# Patient Record
Sex: FEMALE | Race: WHITE | Employment: OTHER | URBAN - NONMETROPOLITAN AREA
[De-identification: names, ages, dates, MRNs, and addresses within clinical notes are randomized per-mention and may not be internally consistent; named-entity substitution may affect disease eponyms.]

---

## 2020-05-19 ENCOUNTER — OFFICE VISIT (OUTPATIENT)
Dept: SURGERY | Age: 70
End: 2020-05-19
Payer: COMMERCIAL

## 2020-05-19 VITALS — DIASTOLIC BLOOD PRESSURE: 80 MMHG | SYSTOLIC BLOOD PRESSURE: 150 MMHG | HEART RATE: 80 BPM

## 2020-05-19 PROBLEM — R59.0 AXILLARY ADENOPATHY: Status: ACTIVE | Noted: 2020-05-19

## 2020-05-19 PROBLEM — N64.4 MASTODYNIA OF RIGHT BREAST: Status: ACTIVE | Noted: 2020-05-19

## 2020-05-19 PROCEDURE — 99204 OFFICE O/P NEW MOD 45 MIN: CPT | Performed by: SURGERY

## 2020-05-19 RX ORDER — FLUTICASONE FUROATE, UMECLIDINIUM BROMIDE AND VILANTEROL TRIFENATATE 100; 62.5; 25 UG/1; UG/1; UG/1
POWDER RESPIRATORY (INHALATION)
COMMUNITY
Start: 2020-05-07

## 2020-05-19 RX ORDER — NEBIVOLOL HYDROCHLORIDE 5 MG/1
TABLET ORAL
COMMUNITY
Start: 2020-04-20

## 2020-05-19 RX ORDER — ESOMEPRAZOLE MAGNESIUM 40 MG/1
CAPSULE, DELAYED RELEASE ORAL
COMMUNITY
Start: 2020-03-23

## 2020-05-19 RX ORDER — ALBUTEROL SULFATE 90 UG/1
2 AEROSOL, METERED RESPIRATORY (INHALATION) EVERY 6 HOURS PRN
COMMUNITY

## 2020-05-19 RX ORDER — GABAPENTIN 100 MG/1
100 CAPSULE ORAL 3 TIMES DAILY
COMMUNITY

## 2020-05-19 RX ORDER — CEPHALEXIN 500 MG/1
500 CAPSULE ORAL 4 TIMES DAILY
Qty: 28 CAPSULE | Refills: 0 | Status: SHIPPED | OUTPATIENT
Start: 2020-05-19 | End: 2020-05-26

## 2020-05-19 RX ORDER — MONTELUKAST SODIUM 10 MG/1
TABLET ORAL
COMMUNITY
Start: 2020-02-27

## 2020-05-19 ASSESSMENT — ENCOUNTER SYMPTOMS
COUGH: 0
ABDOMINAL PAIN: 0
CHEST TIGHTNESS: 0
SORE THROAT: 0
EYE REDNESS: 0
COLOR CHANGE: 0
ABDOMINAL DISTENTION: 1
RECTAL PAIN: 1
CONSTIPATION: 1
SHORTNESS OF BREATH: 1
EYE PAIN: 0
WHEEZING: 1
NAUSEA: 0
DIARRHEA: 1
VOMITING: 0
BACK PAIN: 1

## 2020-05-19 NOTE — PROGRESS NOTES
Dispense:  28 capsule     Refill:  0     Discussed with the patient that her appearance on exam is more consistent with inflammation and infection than inflammatory breast cancer. Will treat with another course of antibiotics. Encouraged warm compresses and NSAIDs. Stressed the importance of smoking cessation and limiting caffeine intake to decrease worsening breast pain and improve healing. She notes understanding. Will proceed with sonogram given significant swelling to the right breast and tender palpable nodule in the right axilla. Further plan pending this report and improvement on antibiotics. Return in about 2 weeks (around 6/2/2020).     DO Sagar 7/36/6875 10:33 AM

## 2020-06-04 ENCOUNTER — HOSPITAL ENCOUNTER (OUTPATIENT)
Dept: WOMENS IMAGING | Age: 70
Discharge: HOME | End: 2020-06-04
Payer: MEDICARE

## 2020-06-04 ENCOUNTER — OFFICE VISIT (OUTPATIENT)
Dept: SURGERY | Age: 70
End: 2020-06-04
Payer: MEDICARE

## 2020-06-04 VITALS
WEIGHT: 173.8 LBS | DIASTOLIC BLOOD PRESSURE: 70 MMHG | HEIGHT: 62 IN | TEMPERATURE: 97.8 F | SYSTOLIC BLOOD PRESSURE: 146 MMHG | BODY MASS INDEX: 31.98 KG/M2

## 2020-06-04 PROBLEM — R59.0 AXILLARY ADENOPATHY: Status: RESOLVED | Noted: 2020-05-19 | Resolved: 2020-06-04

## 2020-06-04 PROCEDURE — 1090F PRES/ABSN URINE INCON ASSESS: CPT | Performed by: SURGERY

## 2020-06-04 PROCEDURE — G8427 DOCREV CUR MEDS BY ELIG CLIN: HCPCS | Performed by: SURGERY

## 2020-06-04 PROCEDURE — 1123F ACP DISCUSS/DSCN MKR DOCD: CPT | Performed by: SURGERY

## 2020-06-04 PROCEDURE — G8400 PT W/DXA NO RESULTS DOC: HCPCS | Performed by: SURGERY

## 2020-06-04 PROCEDURE — 3017F COLORECTAL CA SCREEN DOC REV: CPT | Performed by: SURGERY

## 2020-06-04 PROCEDURE — G8417 CALC BMI ABV UP PARAM F/U: HCPCS | Performed by: SURGERY

## 2020-06-04 PROCEDURE — 99212 OFFICE O/P EST SF 10 MIN: CPT | Performed by: SURGERY

## 2020-06-04 PROCEDURE — 76641 ULTRASOUND BREAST COMPLETE: CPT

## 2020-06-04 PROCEDURE — 4004F PT TOBACCO SCREEN RCVD TLK: CPT | Performed by: SURGERY

## 2020-06-04 PROCEDURE — 4040F PNEUMOC VAC/ADMIN/RCVD: CPT | Performed by: SURGERY

## 2020-06-04 RX ORDER — CEFDINIR 300 MG/1
CAPSULE ORAL
COMMUNITY
Start: 2020-05-29

## 2020-06-04 ASSESSMENT — ENCOUNTER SYMPTOMS
EYE PAIN: 0
CONSTIPATION: 1
SORE THROAT: 0
VOMITING: 0
BACK PAIN: 1
COLOR CHANGE: 0
ABDOMINAL PAIN: 0
EYE REDNESS: 0
COUGH: 0
SHORTNESS OF BREATH: 1
DIARRHEA: 1
RECTAL PAIN: 1
ABDOMINAL DISTENTION: 1
CHEST TIGHTNESS: 0
NAUSEA: 0
WHEEZING: 1

## 2020-06-04 NOTE — PROGRESS NOTES
SUBJECTIVE:  Ms. Francisco Hernandez is a 78 y.o. female who presents today in follow up for her right breast pain and swelling. She was treated with a round of antibiotics and she felt her adenopathy and breast pain did improve. She has since had an URI which is being treated. She does note continued asymmetric breasts with right larger than left. She has no further breast pain, but a tiny ting of pain from time to time in her right axilla. Patient's medications, allergies, past medical, surgical, social and family histories werereviewed and updated as appropriate. Review of Systems   Constitutional: Positive for activity change and fatigue. Negative for fever and unexpected weight change. HENT: Positive for ear pain and tinnitus. Negative for hearing loss, nosebleeds and sore throat. Eyes: Negative for pain, redness and visual disturbance. Respiratory: Positive for shortness of breath and wheezing. Negative for cough and chest tightness. Cardiovascular: Negative for chest pain, palpitations and leg swelling. Gastrointestinal: Positive for abdominal distention, constipation, diarrhea and rectal pain. Negative for abdominal pain, nausea and vomiting. Endocrine: Positive for polydipsia. Negative for cold intolerance and heat intolerance. Genitourinary: Positive for urgency. Negative for difficulty urinating and frequency. Musculoskeletal: Positive for arthralgias, back pain and gait problem. Negative for joint swelling and neck pain. Skin: Negative for color change, rash and wound. Allergic/Immunologic: Negative for environmental allergies and food allergies. Neurological: Negative for seizures, light-headedness and headaches. Hematological: Negative for adenopathy. Bruises/bleeds easily. Psychiatric/Behavioral: Negative for confusion, sleep disturbance and suicidal ideas.        OBJECTIVE:  BP (!) 146/70 (Site: Left Upper Arm, Position: Sitting, Cuff Size: Medium Adult)   Temp 97.8 °F (36.6 °C) 6/4/2020 11:20 AM         ASSESSMENT:   Diagnosis Orders   1. Mastodynia of right breast         PLAN:  Discussed with patient benign findings on exam and improved adenopathy since antibiotic course completed. We discussed her asymmetrical breasts and bra fittings for comfort. She is going to reach out to [facility] about special order bras. Will continue yearly mammography and exams.        Return in about 9 months (around 3/15/2021) for Yearly Mammography and [facility],  4/7/3677 11:44 AM

## 2021-03-25 ENCOUNTER — TELEPHONE (OUTPATIENT)
Dept: SURGERY | Age: 71
End: 2021-03-25

## 2021-03-25 NOTE — TELEPHONE ENCOUNTER
Called patient and gave her the following appt information:    Patient scheduled to have her follow up screening mammogram 4/6/21 arrive at 10:30 for 11:00 appt/  She is having cataract surgery and does not wish to have mammogram until April. She will see Dr. Vivi Hubbard on 4/15/21 at 11:30.

## 2021-05-06 ENCOUNTER — HOSPITAL ENCOUNTER (OUTPATIENT)
Dept: WOMENS IMAGING | Age: 71
Discharge: HOME | End: 2021-05-06
Payer: MEDICARE

## 2021-05-06 DIAGNOSIS — Z12.31 BREAST CANCER SCREENING BY MAMMOGRAM: ICD-10-CM

## 2021-05-06 DIAGNOSIS — N64.4 MASTODYNIA OF RIGHT BREAST: ICD-10-CM

## 2021-05-06 PROCEDURE — G0279 TOMOSYNTHESIS, MAMMO: HCPCS

## 2023-03-21 ENCOUNTER — OFFICE VISIT (OUTPATIENT)
Dept: NEUROLOGY | Age: 73
End: 2023-03-21
Payer: MEDICARE

## 2023-03-21 VITALS
HEIGHT: 61 IN | DIASTOLIC BLOOD PRESSURE: 90 MMHG | WEIGHT: 155 LBS | SYSTOLIC BLOOD PRESSURE: 184 MMHG | HEART RATE: 66 BPM | OXYGEN SATURATION: 100 % | BODY MASS INDEX: 29.27 KG/M2

## 2023-03-21 DIAGNOSIS — R41.3 MEMORY CHANGE: ICD-10-CM

## 2023-03-21 DIAGNOSIS — R53.83 OTHER FATIGUE: ICD-10-CM

## 2023-03-21 DIAGNOSIS — R41.3 MEMORY CHANGE: Primary | ICD-10-CM

## 2023-03-21 LAB
ALBUMIN SERPL-MCNC: 3.6 G/DL (ref 3.5–5.2)
ALP SERPL-CCNC: 113 U/L (ref 35–104)
ALT SERPL-CCNC: 6 U/L (ref 5–33)
ANION GAP SERPL CALCULATED.3IONS-SCNC: 11 MMOL/L (ref 7–19)
AST SERPL-CCNC: 13 U/L (ref 5–32)
BASOPHILS # BLD: 0.1 K/UL (ref 0–0.2)
BASOPHILS NFR BLD: 0.8 % (ref 0–1)
BILIRUB SERPL-MCNC: 0.5 MG/DL (ref 0.2–1.2)
BUN SERPL-MCNC: 8 MG/DL (ref 8–23)
CALCIUM SERPL-MCNC: 9.1 MG/DL (ref 8.8–10.2)
CHLORIDE SERPL-SCNC: 107 MMOL/L (ref 98–111)
CO2 SERPL-SCNC: 26 MMOL/L (ref 22–29)
CREAT SERPL-MCNC: 0.8 MG/DL (ref 0.5–0.9)
CRP SERPL HS-MCNC: 4.26 MG/DL (ref 0–0.5)
EOSINOPHIL # BLD: 0.2 K/UL (ref 0–0.6)
EOSINOPHIL NFR BLD: 3 % (ref 0–5)
ERYTHROCYTE [DISTWIDTH] IN BLOOD BY AUTOMATED COUNT: 15.8 % (ref 11.5–14.5)
ERYTHROCYTE [SEDIMENTATION RATE] IN BLOOD BY WESTERGREN METHOD: 75 MM/HR (ref 0–25)
GLUCOSE SERPL-MCNC: 90 MG/DL (ref 74–109)
HCT VFR BLD AUTO: 40 % (ref 37–47)
HGB BLD-MCNC: 12.8 G/DL (ref 12–16)
HIV-1 P24 AG: NORMAL
HIV1+2 AB SERPLBLD QL IA.RAPID: NORMAL
IMM GRANULOCYTES # BLD: 0 K/UL
LYMPHOCYTES # BLD: 2.3 K/UL (ref 1.1–4.5)
LYMPHOCYTES NFR BLD: 29 % (ref 20–40)
MCH RBC QN AUTO: 31 PG (ref 27–31)
MCHC RBC AUTO-ENTMCNC: 32 G/DL (ref 33–37)
MCV RBC AUTO: 96.9 FL (ref 81–99)
MONOCYTES # BLD: 0.7 K/UL (ref 0–0.9)
MONOCYTES NFR BLD: 9.2 % (ref 0–10)
NEUTROPHILS # BLD: 4.6 K/UL (ref 1.5–7.5)
NEUTS SEG NFR BLD: 57.7 % (ref 50–65)
PLATELET # BLD AUTO: 399 K/UL (ref 130–400)
PMV BLD AUTO: 9.8 FL (ref 9.4–12.3)
POTASSIUM SERPL-SCNC: 3.9 MMOL/L (ref 3.5–5)
PROT SERPL-MCNC: 6.8 G/DL (ref 6.6–8.7)
RBC # BLD AUTO: 4.13 M/UL (ref 4.2–5.4)
SODIUM SERPL-SCNC: 144 MMOL/L (ref 136–145)
TSH SERPL DL<=0.005 MIU/L-ACNC: 1.93 UIU/ML (ref 0.35–5.5)
WBC # BLD AUTO: 7.9 K/UL (ref 4.8–10.8)

## 2023-03-21 PROCEDURE — 1123F ACP DISCUSS/DSCN MKR DOCD: CPT | Performed by: NURSE PRACTITIONER

## 2023-03-21 PROCEDURE — 99204 OFFICE O/P NEW MOD 45 MIN: CPT | Performed by: NURSE PRACTITIONER

## 2023-03-21 RX ORDER — LOSARTAN POTASSIUM 100 MG/1
TABLET ORAL
COMMUNITY
Start: 2023-01-23

## 2023-03-21 NOTE — PROGRESS NOTES
REVIEW OF SYSTEMS    Constitutional: []Fever []Sweats []Chills [] Recent Injury [x] Denies all unless marked  HEENT:[]Headache  [] Head Injury [] Hearing Loss  [] Sore Throat  [] Ear Ache [x] Denies all unless marked  Spine:  [] Neck pain  [] Back pain  [] Sciaticia  [x] Denies all unless marked  Cardiovascular:[]Heart Disease []Palpitations [] Chest Pain   [x] Denies all unless marked  Pulmonary: []Shortness of Breath []Cough   [x] Denies all unless marked  Psychiatric/Behavioral:[] Depression [] Anxiety [x] Denies all unless marked  Gastrointestinal: []Nausea  []Vomiting  []Abdominal Pain  []Constipation  []Diarrhea  [x] Denies all unless marked  Genitourinary:   [] Frequency  [] Urgency  [] Dysuria [] Incontinence  [x] Denies all unless marked  Extremities: []Pain  []Swelling  [x] Denies all unless marked  Musculoskeletal: [] Myalgias  [] Joint Pain  [] Arthritis [] Muscle Cramps [] Muscle Twitches  [x] Denies all unless marked  Sleep: []Insomnia[]Snoring []Restless Legs  []Sleep Apnea  []Daytime Sleepiness  [x] Denies all unless marked  Skin:[] Rash [] Color Change [x] Denies all unless marked   Neurological:[]Visual Disturbance [x] Memory Loss []Loss of Balance []Slurred Speech []Weakness []Seizures  [] Dizziness [x] Denies all unless marked
intact. COMMENTS: MoCA 27/30    Cranial Nerves [x]No VF deficit to confrontation,  no papilledema on fundoscopic exam.  [x]PERRLA, EOMI, no nystagmus, conjugate eye movements, no ptosis  [x]Face symmetric  [x]Facial sensation intact  [x]Tongue midline no atrophy or fasciculations present  [x]Palate midline, hearing to finger rub normal bilaterally  [x]Shoulder shrug and SCM testing normal bilaterally  COMMENTS:   Motor   [x]5/5 strength x 4 extremities  [x]Normal bulk and tone  [x]No tremor present  [x]No rigidity or bradykinesia noted  COMMENTS:   Sensory  [x]Sensation intact to light touch, pin prick, vibration, and proprioception BLE  [x]Sensation intact to light touch, pin prick, vibration, and proprioception BUE  COMMENTS:   Coordination [x]FTN normal bilaterally   [x]HTS normal bilaterally  [x]KELSEY normal bilaterally. COMMENTS:   Reflexes  [x]Symmetric and non-pathological  [x]Toes down going bilaterally  [x]No clonus present  COMMENTS:   Gait                  [x]Normal steady gait    []Ataxic    []Spastic     []Magnetic     []Shuffling  COMMENTS:       LABS RECORD AND IMAGING REVIEW (As below and per HPI)    No results found for: Pio Monroys  Lab Results   Component Value Date    WBC 7.9 03/21/2023    HGB 12.8 03/21/2023    HCT 40.0 03/21/2023    MCV 96.9 03/21/2023     03/21/2023     No results found for: NA, K, CL, CO2, BUN, CREATININE, GLUCOSE, CALCIUM, PROT, LABALBU, BILITOT, ALKPHOS, AST, ALT, LABGLOM, GFRAA, AGRATIO, GLOB  No results found for: CHOL, TRIG, HDL, LDLCALC  No results found for: TSH, T4FREE  No results found for: CRP, SEDRATE     Vitamin D- 10.6; Vitamin B12- 235     [facility] DIGITAL DIAGNOSTIC W OR WO CAD BILATERAL    Result Date: 5/6/2021  EXAM: [facility] DIGITAL DIAGNOSTIC W OR WO CAD BILATERAL -- 5/6/2021 7:38 AM INDICATION: Screen.  COMPARISON: 3/10/2020, 3/7/2019 (from [facility] with outside report reviewed under media scan date 5/14/2020) FAMILY HISTORY OF BREAST CANCER: None

## 2023-03-23 LAB — RPR SER QL: NORMAL

## 2023-03-25 LAB — VIT B1 BLD-MCNC: 156 NMOL/L (ref 70–180)

## 2023-04-17 ENCOUNTER — HOSPITAL ENCOUNTER (OUTPATIENT)
Dept: MRI IMAGING | Age: 73
Discharge: HOME | End: 2023-04-17
Payer: MEDICARE

## 2023-04-17 DIAGNOSIS — R90.89 ABNORMAL FINDING ON MRI OF BRAIN: Primary | ICD-10-CM

## 2023-04-17 DIAGNOSIS — R41.3 MEMORY CHANGE: ICD-10-CM

## 2023-04-17 PROCEDURE — 6360000004 HC RX CONTRAST MEDICATION: Performed by: NURSE PRACTITIONER

## 2023-04-17 PROCEDURE — 70553 MRI BRAIN STEM W/O & W/DYE: CPT | Performed by: RADIOLOGY

## 2023-04-17 PROCEDURE — A9577 INJ MULTIHANCE: HCPCS | Performed by: NURSE PRACTITIONER

## 2023-04-17 PROCEDURE — 70553 MRI BRAIN STEM W/O & W/DYE: CPT

## 2023-04-17 RX ADMIN — GADOBENATE DIMEGLUMINE 14 ML: 529 INJECTION, SOLUTION INTRAVENOUS at 09:20

## 2023-04-24 ENCOUNTER — TELEPHONE (OUTPATIENT)
Dept: NEUROLOGY | Age: 73
End: 2023-04-24

## 2023-04-24 NOTE — TELEPHONE ENCOUNTER
Pt called spoke with me about MRI and labs. Pt voiced understanding to all findings.  Call transferred to scheduling for NM whole body scan

## 2023-05-01 ENCOUNTER — HOSPITAL ENCOUNTER (OUTPATIENT)
Dept: NUCLEAR MEDICINE | Age: 73
Discharge: HOME | End: 2023-05-03
Payer: MEDICARE

## 2023-05-01 DIAGNOSIS — R90.89 ABNORMAL FINDING ON MRI OF BRAIN: ICD-10-CM

## 2023-05-01 PROCEDURE — 78306 BONE IMAGING WHOLE BODY: CPT | Performed by: NURSE PRACTITIONER

## 2023-05-01 PROCEDURE — 3430000000 HC RX DIAGNOSTIC RADIOPHARMACEUTICAL: Performed by: NURSE PRACTITIONER

## 2023-05-01 PROCEDURE — 78306 BONE IMAGING WHOLE BODY: CPT | Performed by: RADIOLOGY

## 2023-05-01 PROCEDURE — A9503 TC99M MEDRONATE: HCPCS | Performed by: NURSE PRACTITIONER

## 2023-05-01 RX ORDER — TC 99M MEDRONATE 20 MG/10ML
20 INJECTION, POWDER, LYOPHILIZED, FOR SOLUTION INTRAVENOUS
Status: COMPLETED | OUTPATIENT
Start: 2023-05-01 | End: 2023-05-01

## 2023-05-01 RX ADMIN — TC 99M MEDRONATE 20 MILLICURIE: 20 INJECTION, POWDER, LYOPHILIZED, FOR SOLUTION INTRAVENOUS at 13:53

## 2023-05-05 DIAGNOSIS — R94.8 ABNORMAL RADIONUCLIDE BONE SCAN: Primary | ICD-10-CM

## 2023-05-08 ENCOUNTER — TELEPHONE (OUTPATIENT)
Dept: NEUROLOGY | Age: 73
End: 2023-05-08

## 2023-05-08 NOTE — TELEPHONE ENCOUNTER
Called pt spoke with her in great detail about labs per result notes form [facility]. Pt is aware of further work up and scheduled for all test.  Pt will call my direct line if anything is needed.

## 2023-05-08 NOTE — TELEPHONE ENCOUNTER
Pt's daughter, Luke Watts, called asking about results of the Whole Body Scan. Please contact Luke Watts when able, daughter states pt is starting to go into a depression state, waiting on results.  Thank you

## 2023-05-08 NOTE — TELEPHONE ENCOUNTER
Patient daughter called again get update on results, not listed on HIPAA. She is requesting office to call mother as soon as possible as patient in worries state.      Thank you

## 2023-05-08 NOTE — TELEPHONE ENCOUNTER
Patient Daughter Carlyn Frankel stopped by the office to get results. She stated at the window \"I am on the clock is this going to be a while? \" Wilfrido Sallyholly explained to daughter that we were in clinic today but would call her. Daughter voice that \"I will just wait, I am getting some results. \"    I called daughter to room 4 to explain the situation. I am unable to give any results to her due to her not being on hippa. She voiced she would get a verbal hippa and I again explained that I was not able to do so because I could not guarantee that the patient was the one giving consent. I did explain to her that I would call the patient by end of day today. Her daughter stated that \"at this point this is cruel and unusual punishment\". I apologized that Bassam Noriega was not in clinic on Friday so I was not able to discuss this finding until today and that I had not had a chance today to call her. Daughter voiced understanding and left office.

## 2023-05-18 DIAGNOSIS — R94.8 ABNORMAL RADIONUCLIDE SCAN: Primary | ICD-10-CM

## 2023-05-18 NOTE — PROGRESS NOTES
MEDICAL ONCOLOGY CONSULTATION    Pt Name: Mellisa Luevano  MRN: [id number]  [place]: 1950  Date of evaluation: 5/21/2023    REASON FOR CONSULTATION:  Bone lesions  REQUESTING PHYSICIAN: ALEJANDRA De Leon/[facility]    History Obtained From:  patient and old medical records    HISTORY OF PRESENT ILLNESS:    Diagnosis  Left parietal bone lesion, April 2023  Thoracic/lumbar spine lesions    Treatment Summary  To be determined    Cancer History  Judit Alcantar was first seen by me on 5/19/2023. She was referred by neurology for abnormal findings of a enhancing lesion in the calvarium. 3/21/23 [facility] labs: CBC-WBC 7.9, HGB 12.8, HCT 40.0, , NEUT 4.6; CMP-WNL;  CRP 4.26; RPR negative; ESR 75; HIV-negative; B12 156; TSH reflex T4 1.93  4/17/23 MRI brain: Findings compatible with chronic microvascular ischemic change. [place] cystic focus within the left frontoparietal region may represent sequela of remote infarct. No diffusion restriction is identified to suggest acute infarct. Developmental venous anomaly within the right parietal lobe. Indeterminate 1.3 cm enhancing lesion within the left parietal bone. Neoplastic lesion cannot be excluded. Nuclear bone scan may be useful for further characterization of this lesion. 5/1/23 Bone scan: Seen best on the lateral view of the skull, there is a small focus of increased radiotracer accumulation which likely corresponds to the enhancing lesion of the left parietal bone seen on recent brain MRI from 4/17/2023. This finding remains indeterminate. Neoplastic lesion cannot be excluded. Additional multifocal radiotracer accumulation is seen within the thoracic and lumbar spine. Osseous metastatic disease cannot be excluded. Multiple compression fractures within the spine could produce a similar appearance.  Further evaluation with MRI of the thoracic and lumbar spine with and without IV contrast may be beneficial. If patient does not have a known history of malignancy,

## 2023-05-19 ENCOUNTER — HOSPITAL ENCOUNTER (OUTPATIENT)
Dept: MRI IMAGING | Age: 73
Discharge: HOME | End: 2023-05-19
Payer: MEDICARE

## 2023-05-19 ENCOUNTER — OFFICE VISIT (OUTPATIENT)
Dept: HEMATOLOGY | Age: 73
End: 2023-05-19
Payer: MEDICARE

## 2023-05-19 ENCOUNTER — HOSPITAL ENCOUNTER (OUTPATIENT)
Dept: INFUSION THERAPY | Age: 73
Discharge: HOME | End: 2023-05-19
Payer: MEDICARE

## 2023-05-19 VITALS
HEIGHT: 61 IN | WEIGHT: 151 LBS | SYSTOLIC BLOOD PRESSURE: 120 MMHG | BODY MASS INDEX: 28.51 KG/M2 | TEMPERATURE: 96.8 F | HEART RATE: 67 BPM | OXYGEN SATURATION: 96 % | DIASTOLIC BLOOD PRESSURE: 70 MMHG

## 2023-05-19 DIAGNOSIS — M89.9 LESION OF BONE OF LUMBOSACRAL SPINE: ICD-10-CM

## 2023-05-19 DIAGNOSIS — E87.1 HYPONATREMIA: ICD-10-CM

## 2023-05-19 DIAGNOSIS — Z71.89 CARE PLAN DISCUSSED WITH PATIENT: ICD-10-CM

## 2023-05-19 DIAGNOSIS — R94.8 ABNORMAL RADIONUCLIDE SCAN: ICD-10-CM

## 2023-05-19 DIAGNOSIS — R94.8 ABNORMAL RADIONUCLIDE BONE SCAN: ICD-10-CM

## 2023-05-19 DIAGNOSIS — M89.9 LESION OF LEFT PARIETAL BONE: ICD-10-CM

## 2023-05-19 DIAGNOSIS — M89.9 LYTIC BONE LESIONS ON XRAY: Primary | ICD-10-CM

## 2023-05-19 DIAGNOSIS — M89.9 LESION OF BONE OF THORACIC SPINE: ICD-10-CM

## 2023-05-19 DIAGNOSIS — M89.9 LYTIC BONE LESIONS ON XRAY: ICD-10-CM

## 2023-05-19 LAB
ALBUMIN SERPL-MCNC: 3.9 G/DL (ref 3.5–5.2)
ALP SERPL-CCNC: 115 U/L (ref 35–104)
ALT SERPL-CCNC: 8 U/L (ref 5–33)
ANION GAP SERPL CALCULATED.3IONS-SCNC: 10 MMOL/L (ref 7–19)
AST SERPL-CCNC: 15 U/L (ref 5–32)
BASOPHILS # BLD: 0.05 K/UL (ref 0.01–0.08)
BASOPHILS NFR BLD: 0.7 % (ref 0.1–1.2)
BILIRUB SERPL-MCNC: 0.5 MG/DL (ref 0.2–1.2)
BUN SERPL-MCNC: 9 MG/DL (ref 8–23)
CALCIUM SERPL-MCNC: 9.3 MG/DL (ref 8.8–10.2)
CHLORIDE SERPL-SCNC: 88 MMOL/L (ref 98–111)
CO2 SERPL-SCNC: 25 MMOL/L (ref 22–29)
CREAT SERPL-MCNC: 0.8 MG/DL (ref 0.5–0.9)
EOSINOPHIL # BLD: 0.16 K/UL (ref 0.04–0.54)
EOSINOPHIL NFR BLD: 2.1 % (ref 0.7–7)
ERYTHROCYTE [DISTWIDTH] IN BLOOD BY AUTOMATED COUNT: 14.8 % (ref 11.7–14.4)
GLUCOSE SERPL-MCNC: 90 MG/DL (ref 74–109)
HCT VFR BLD AUTO: 38.9 % (ref 34.1–44.9)
HGB BLD-MCNC: 12.8 G/DL (ref 11.2–15.7)
LYMPHOCYTES # BLD: 2.97 K/UL (ref 1.18–3.74)
LYMPHOCYTES NFR BLD: 39.8 % (ref 19.3–53.1)
MCH RBC QN AUTO: 31.2 PG (ref 25.6–32.2)
MCHC RBC AUTO-ENTMCNC: 32.9 G/DL (ref 32.3–35.5)
MCV RBC AUTO: 94.9 FL (ref 79.4–94.8)
MONOCYTES # BLD: 0.72 K/UL (ref 0.24–0.82)
MONOCYTES NFR BLD: 9.6 % (ref 4.7–12.5)
NEUTROPHILS # BLD: 3.55 K/UL (ref 1.56–6.13)
NEUTS SEG NFR BLD: 47.5 % (ref 34–71.1)
PLATELET # BLD AUTO: 360 K/UL (ref 182–369)
PMV BLD AUTO: 9.1 FL (ref 7.4–10.4)
POTASSIUM SERPL-SCNC: 3.9 MMOL/L (ref 3.5–5)
PROT SERPL-MCNC: 6.9 G/DL (ref 6.6–8.7)
RBC # BLD AUTO: 4.1 M/UL (ref 3.93–5.22)
SODIUM SERPL-SCNC: 123 MMOL/L (ref 136–145)
WBC # BLD AUTO: 7.47 K/UL (ref 3.98–10.04)

## 2023-05-19 PROCEDURE — A9577 INJ MULTIHANCE: HCPCS | Performed by: NURSE PRACTITIONER

## 2023-05-19 PROCEDURE — 36415 COLL VENOUS BLD VENIPUNCTURE: CPT

## 2023-05-19 PROCEDURE — 99212 OFFICE O/P EST SF 10 MIN: CPT

## 2023-05-19 PROCEDURE — 1123F ACP DISCUSS/DSCN MKR DOCD: CPT | Performed by: INTERNAL MEDICINE

## 2023-05-19 PROCEDURE — 6360000004 HC RX CONTRAST MEDICATION: Performed by: NURSE PRACTITIONER

## 2023-05-19 PROCEDURE — 85025 COMPLETE CBC W/AUTO DIFF WBC: CPT

## 2023-05-19 PROCEDURE — 72158 MRI LUMBAR SPINE W/O & W/DYE: CPT

## 2023-05-19 PROCEDURE — 99205 OFFICE O/P NEW HI 60 MIN: CPT | Performed by: INTERNAL MEDICINE

## 2023-05-19 RX ORDER — HYDROCHLOROTHIAZIDE 12.5 MG/1
12.5 CAPSULE, GELATIN COATED ORAL DAILY
COMMUNITY
Start: 2023-05-03

## 2023-05-19 RX ORDER — ROPINIROLE 1 MG/1
1 TABLET, FILM COATED ORAL 2 TIMES DAILY PRN
COMMUNITY
Start: 2023-04-26

## 2023-05-19 RX ADMIN — GADOBENATE DIMEGLUMINE 14 ML: 529 INJECTION, SOLUTION INTRAVENOUS at 18:39

## 2023-05-19 ASSESSMENT — PROMIS GLOBAL HEALTH SCALE
IN THE PAST 7 DAYS, HOW WOULD YOU RATE YOUR FATIGUE ON AVERAGE [ON A SCALE FROM 1 (NONE) TO 5 (VERY SEVERE)]?: 4
IN GENERAL, HOW WOULD YOU RATE YOUR PHYSICAL HEALTH [ON A SCALE OF 1 (POOR) TO 5 (EXCELLENT)]?: 4
IN THE PAST 7 DAYS, HOW OFTEN HAVE YOU BEEN BOTHERED BY EMOTIONAL PROBLEMS, SUCH AS FEELING ANXIOUS, DEPRESSED, OR IRRITABLE [ON A SCALE FROM 1 (NEVER) TO 5 (ALWAYS)]?: 4
IN GENERAL, WOULD YOU SAY YOUR HEALTH IS...[ON A SCALE OF 1 (POOR) TO 5 (EXCELLENT)]: 4
SUM OF RESPONSES TO QUESTIONS 3, 6, 7, & 8: 13
SUM OF RESPONSES TO QUESTIONS 2, 4, 5, & 10: 17
IN GENERAL, HOW WOULD YOU RATE YOUR MENTAL HEALTH, INCLUDING YOUR MOOD AND YOUR ABILITY TO THINK [ON A SCALE OF 1 (POOR) TO 5 (EXCELLENT)]?: 4
TO WHAT EXTENT ARE YOU ABLE TO CARRY OUT YOUR EVERYDAY PHYSICAL ACTIVITIES SUCH AS WALKING, CLIMBING STAIRS, CARRYING GROCERIES, OR MOVING A CHAIR [ON A SCALE OF 1 (NOT AT ALL) TO 5 (COMPLETELY)]?: 5
IN GENERAL, HOW WOULD YOU RATE YOUR SATISFACTION WITH YOUR SOCIAL ACTIVITIES AND RELATIONSHIPS [ON A SCALE OF 1 (POOR) TO 5 (EXCELLENT)]?: 5
IN THE PAST 7 DAYS, HOW WOULD YOU RATE YOUR PAIN ON AVERAGE [ON A SCALE FROM 0 (NO PAIN) TO 10 (WORST IMAGINABLE PAIN)]?: 0
IN GENERAL, PLEASE RATE HOW WELL YOU CARRY OUT YOUR USUAL SOCIAL ACTIVITIES (INCLUDES ACTIVITIES AT HOME, AT WORK, AND IN YOUR COMMUNITY, AND RESPONSIBILITIES AS A PARENT, CHILD, SPOUSE, EMPLOYEE, FRIEND, ETC) [ON A SCALE OF 1 (POOR) TO 5 (EXCELLENT)]?: 5
IN GENERAL, WOULD YOU SAY YOUR QUALITY OF LIFE IS...[ON A SCALE OF 1 (POOR) TO 5 (EXCELLENT)]: 4

## 2023-05-22 ENCOUNTER — HOSPITAL ENCOUNTER (OUTPATIENT)
Dept: CT IMAGING | Age: 73
Discharge: HOME | End: 2023-05-22
Payer: MEDICARE

## 2023-05-22 ENCOUNTER — HOSPITAL ENCOUNTER (OUTPATIENT)
Dept: MRI IMAGING | Age: 73
Discharge: HOME | End: 2023-05-22
Payer: MEDICARE

## 2023-05-22 DIAGNOSIS — R94.8 ABNORMAL RADIONUCLIDE BONE SCAN: ICD-10-CM

## 2023-05-22 LAB
ALBUMIN SERPL-MCNC: 3.72 G/DL (ref 3.75–5.01)
ALPHA1 GLOB SERPL ELPH-MCNC: 0.39 G/DL (ref 0.19–0.46)
ALPHA2 GLOB SERPL ELPH-MCNC: 0.78 G/DL (ref 0.48–1.05)
B-GLOBULIN SERPL ELPH-MCNC: 0.78 G/DL (ref 0.48–1.1)
DEPRECATED KAPPA LC FREE/LAMBDA SER: 1.45 {RATIO} (ref 0.26–1.65)
GAMMA GLOB SERPL ELPH-MCNC: 1.03 G/DL (ref 0.62–1.51)
IGA SERPL-MCNC: 189 MG/DL (ref 68–408)
IGG SERPL-MCNC: 877 MG/DL (ref 768–1632)
IGM SERPL-MCNC: 369 MG/DL (ref 35–263)
INTERPRETATION SERPL IFE-IMP: ABNORMAL
INTERPRETATION SERPL IFE-IMP: ABNORMAL
KAPPA LC FREE SER-MCNC: 40.6 MG/L (ref 3.3–19.4)
LAMBDA LC FREE SERPL-MCNC: 28.08 MG/L (ref 5.71–26.3)
PROT SERPL-MCNC: 6.7 G/DL (ref 6.3–8.2)

## 2023-05-22 PROCEDURE — 74178 CT ABD&PLV WO CNTR FLWD CNTR: CPT

## 2023-05-22 PROCEDURE — 72157 MRI CHEST SPINE W/O & W/DYE: CPT | Performed by: RADIOLOGY

## 2023-05-22 PROCEDURE — 71270 CT THORAX DX C-/C+: CPT

## 2023-05-22 PROCEDURE — 6360000004 HC RX CONTRAST MEDICATION: Performed by: NURSE PRACTITIONER

## 2023-05-22 PROCEDURE — 72157 MRI CHEST SPINE W/O & W/DYE: CPT

## 2023-05-22 PROCEDURE — A9577 INJ MULTIHANCE: HCPCS | Performed by: NURSE PRACTITIONER

## 2023-05-22 PROCEDURE — 74178 CT ABD&PLV WO CNTR FLWD CNTR: CPT | Performed by: RADIOLOGY

## 2023-05-22 PROCEDURE — 71270 CT THORAX DX C-/C+: CPT | Performed by: RADIOLOGY

## 2023-05-22 RX ADMIN — IOPAMIDOL 75 ML: 755 INJECTION, SOLUTION INTRAVENOUS at 10:22

## 2023-05-22 RX ADMIN — GADOBENATE DIMEGLUMINE 15 ML: 529 INJECTION, SOLUTION INTRAVENOUS at 09:12

## 2023-05-23 DIAGNOSIS — M89.9 LESION OF BONE OF LUMBOSACRAL SPINE: ICD-10-CM

## 2023-05-23 DIAGNOSIS — R93.7 ABNORMAL MRI, LUMBAR SPINE: Primary | ICD-10-CM

## 2023-05-23 DIAGNOSIS — M89.9 LYTIC BONE LESIONS ON XRAY: Primary | ICD-10-CM

## 2023-05-23 DIAGNOSIS — R90.89 ABNORMAL FINDING ON MRI OF BRAIN: ICD-10-CM

## 2023-05-23 DIAGNOSIS — M89.9 LESION OF BONE OF THORACIC SPINE: ICD-10-CM

## 2023-05-23 DIAGNOSIS — M89.9 LESION OF LEFT PARIETAL BONE: ICD-10-CM

## 2023-05-23 NOTE — PROGRESS NOTES
Patient was initially seen for memory loss. MRI brain ordered as part of routine work-up and there was a suspicious enhancing lesion in left parietal bone. Nuclear medicine bone scan ordered to further evaluate abnormality which showed enhancing lesion in the left parietal bone, neoplasm cannot be excluded. Additionally there were abnormalities noted in the thoracic and lumbar spine. Additional labs placed by me however those were not drawn. Given the nuc med bone scan abnormalities I ordered CT chest, abdomen, pelvis and MRI T/L-spine. I also refered the patient to oncology. She has been seen by Dr. Marsha Chaney who has ordered additional labs and got her MRIs moved forward. Possible abnormality in the lungs noted on CT chest however CT abdomen pelvis was normal.  MRI T-spine overall looked okay. Questionable abnormality noted on the MRI lumbar spine and the L3 vertebral body. We will have patient see neurosurgery for opinion on MRI lumbar spine and left parietal bone abnormality. She needs to continue to follow with Dr. Marsha Chaney as well.

## 2023-05-24 ENCOUNTER — TELEPHONE (OUTPATIENT)
Dept: NEUROSURGERY | Age: 73
End: 2023-05-24

## 2023-05-24 NOTE — TELEPHONE ENCOUNTER
[facility] New Patient Questionnaire    Diagnosis/Reason for Referral?    Abnormal MRI, lumbar spine  R90.89 (ICD-10-CM) - Abnormal finding on MRI of brain    2. Who is completing questionnaire? Patient  Caregiver Family      3. Has the patient had any previous spinal/brain surgeries? NO      A. If yes, what is the name of the facility in which the surgery was performed? B. Procedure/Surgery performed? C. Who was the surgeon? D. When was the surgery? MM/YY       E. Did the patient improve after the surgery? 4. Is this a second opinion? If yes, Dr. Mendel Maul would like to review patient first before making the appointment. NO    5. Have MRI Images been obtain within the last year? Yes  No      XR  CT     If yes, where was the imaging performed? [facility]   If yes, what part of the body? Lumbar  Cervical  Thoracic  Brain     If yes, when was it obtained? 5/22/23    Note: if the scan was performed at a facility other than [address] [place] [address], the disc will need to be brought to the appointment or we need to reach out to obtain the disc. A. Was the patient instructed to provide the disc? Yes   No      8. Has the patient had a NCV/EMG within the last year? Yes  No     If yes, where was it performed and date? MM/YY  Location:      9. Has the patient been to Physical Therapy? Yes  No     If yes, what location, how long attended, and last visit? Location:        Therapy Lasted:    Date of Last Visit:      10. Has the patient been to Pain Management? Yes  No     If yes, what location and last visit     Location:   Last Visit:   Is it helping?

## 2023-05-24 NOTE — TELEPHONE ENCOUNTER
Patient called back for directions to the office. Patient given directions to the office, informed her that office is the same is ALEJANDRA Escobar.

## 2023-05-25 ENCOUNTER — OFFICE VISIT (OUTPATIENT)
Dept: NEUROSURGERY | Age: 73
End: 2023-05-25
Payer: MEDICARE

## 2023-05-25 ENCOUNTER — CLINICAL DOCUMENTATION (OUTPATIENT)
Dept: HEMATOLOGY | Age: 73
End: 2023-05-25

## 2023-05-25 VITALS
HEART RATE: 57 BPM | DIASTOLIC BLOOD PRESSURE: 82 MMHG | WEIGHT: 151 LBS | RESPIRATION RATE: 18 BRPM | SYSTOLIC BLOOD PRESSURE: 171 MMHG | HEIGHT: 61 IN | BODY MASS INDEX: 28.51 KG/M2

## 2023-05-25 DIAGNOSIS — M89.9 SKULL LESION: Primary | ICD-10-CM

## 2023-05-25 DIAGNOSIS — R93.7 ABNORMAL MRI, LUMBAR SPINE: ICD-10-CM

## 2023-05-25 PROCEDURE — 1123F ACP DISCUSS/DSCN MKR DOCD: CPT | Performed by: NEUROLOGICAL SURGERY

## 2023-05-25 PROCEDURE — 99204 OFFICE O/P NEW MOD 45 MIN: CPT | Performed by: NEUROLOGICAL SURGERY

## 2023-05-25 ASSESSMENT — ENCOUNTER SYMPTOMS
GASTROINTESTINAL NEGATIVE: 1
EYES NEGATIVE: 1
RESPIRATORY NEGATIVE: 1
BACK PAIN: 1

## 2023-05-25 NOTE — PROGRESS NOTES
Per Dr River Hippo request, 5/19/23 and 5/23/23 labs faxed to PCP/Emily Collette Riling, APRN for her to review and treat.

## 2023-05-25 NOTE — PROGRESS NOTES
[facility]  Office Visit      Chief Complaint   Patient presents with    New Patient     Establishing care     Results     MRI L/T Spine (5/20/2023)    Back Pain     Patient c/o lower back pain that has been present for several years, she states activity such as bending over to make the bed will make her pain worse. No Pt during the last 1 year for this. Numbness     Intermittent numb/tingling sensation in the RT hip, symptoms are not present on the LT. Memory Loss     Patient notes some memory issues, she describes this as knowing that she needs to say something but has difficulty remembering what wording to use. Patient had an MRI brain completed here 4/2023. HISTORY OF PRESENT ILLNESS:    Moon Regalado is a 72 y.o. female  who presents with abnormal MRI brain, thoracic and lumbar spine. She states she really does not know why she is here. She states it all started with her having memory issues. Does have some back pain and some pain that travels into the RIGHT hip or leg but is so infrequent that she cannot recall and specific pattern. Has had some mild headaches. She states her memory changes have pretty much resolved. She states she can now talk on the phone and finish her sentences.                    Past Medical History:   Diagnosis Date    GERD (gastroesophageal reflux disease)     Hypertension        Past Surgical History:   Procedure Laterality Date    CARPAL TUNNEL RELEASE Right     GALLBLADDER SURGERY      HYSTERECTOMY, TOTAL ABDOMINAL (CERVIX REMOVED)      OVARY REMOVAL      TONSILLECTOMY AND ADENOIDECTOMY         Current Outpatient Medications   Medication Sig Dispense Refill    hydroCHLOROthiazide (MICROZIDE) 12.5 MG capsule 1 capsule daily      rOPINIRole (REQUIP) 1 MG tablet 1 tablet 2 times daily as needed      losartan (COZAAR) 100 MG tablet       nebivolol (BYSTOLIC) 20 MG TABS tablet Take 1 tablet by mouth daily      esomeprazole (NEXIUM) 40 MG delayed

## 2023-06-05 ENCOUNTER — OFFICE VISIT (OUTPATIENT)
Dept: HEMATOLOGY | Age: 73
End: 2023-06-05
Payer: MEDICARE

## 2023-06-05 ENCOUNTER — HOSPITAL ENCOUNTER (OUTPATIENT)
Dept: INFUSION THERAPY | Age: 73
Discharge: HOME | End: 2023-06-05
Payer: MEDICARE

## 2023-06-05 VITALS
HEIGHT: 61 IN | BODY MASS INDEX: 28.53 KG/M2 | OXYGEN SATURATION: 99 % | HEART RATE: 74 BPM | DIASTOLIC BLOOD PRESSURE: 82 MMHG | SYSTOLIC BLOOD PRESSURE: 126 MMHG | WEIGHT: 151.1 LBS

## 2023-06-05 DIAGNOSIS — E87.1 HYPONATREMIA: Primary | ICD-10-CM

## 2023-06-05 DIAGNOSIS — Z71.89 CARE PLAN DISCUSSED WITH PATIENT: ICD-10-CM

## 2023-06-05 DIAGNOSIS — R94.8 ABNORMAL RADIONUCLIDE SCAN: ICD-10-CM

## 2023-06-05 DIAGNOSIS — M89.9 BONE LESION: ICD-10-CM

## 2023-06-05 LAB
BASOPHILS # BLD: 0.07 K/UL (ref 0.01–0.08)
BASOPHILS NFR BLD: 0.9 % (ref 0.1–1.2)
EOSINOPHIL # BLD: 0.15 K/UL (ref 0.04–0.54)
EOSINOPHIL NFR BLD: 2 % (ref 0.7–7)
ERYTHROCYTE [DISTWIDTH] IN BLOOD BY AUTOMATED COUNT: 15.2 % (ref 11.7–14.4)
HCT VFR BLD AUTO: 37.8 % (ref 34.1–44.9)
HGB BLD-MCNC: 12.7 G/DL (ref 11.2–15.7)
LYMPHOCYTES # BLD: 3.06 K/UL (ref 1.18–3.74)
LYMPHOCYTES NFR BLD: 41.2 % (ref 19.3–53.1)
MCH RBC QN AUTO: 32.1 PG (ref 25.6–32.2)
MCHC RBC AUTO-ENTMCNC: 33.6 G/DL (ref 32.3–35.5)
MCV RBC AUTO: 95.5 FL (ref 79.4–94.8)
MONOCYTES # BLD: 0.7 K/UL (ref 0.24–0.82)
MONOCYTES NFR BLD: 9.4 % (ref 4.7–12.5)
NEUTROPHILS # BLD: 3.43 K/UL (ref 1.56–6.13)
NEUTS SEG NFR BLD: 46.4 % (ref 34–71.1)
PLATELET # BLD AUTO: 327 K/UL (ref 182–369)
PMV BLD AUTO: 9.2 FL (ref 7.4–10.4)
RBC # BLD AUTO: 3.96 M/UL (ref 3.93–5.22)
WBC # BLD AUTO: 7.42 K/UL (ref 3.98–10.04)

## 2023-06-05 PROCEDURE — 99213 OFFICE O/P EST LOW 20 MIN: CPT | Performed by: INTERNAL MEDICINE

## 2023-06-05 PROCEDURE — 36415 COLL VENOUS BLD VENIPUNCTURE: CPT

## 2023-06-05 PROCEDURE — 99211 OFF/OP EST MAY X REQ PHY/QHP: CPT

## 2023-06-05 PROCEDURE — 85025 COMPLETE CBC W/AUTO DIFF WBC: CPT

## 2023-06-05 PROCEDURE — 1123F ACP DISCUSS/DSCN MKR DOCD: CPT | Performed by: INTERNAL MEDICINE

## 2023-06-26 ENCOUNTER — TELEPHONE (OUTPATIENT)
Dept: NEUROLOGY | Age: 73
End: 2023-06-26

## 2023-07-19 ENCOUNTER — HOSPITAL ENCOUNTER (OUTPATIENT)
Dept: CT IMAGING | Age: 73
Discharge: HOME | End: 2023-07-19
Payer: MEDICARE

## 2023-07-19 ENCOUNTER — HOSPITAL ENCOUNTER (OUTPATIENT)
Dept: MRI IMAGING | Age: 73
Discharge: HOME | End: 2023-07-19
Attending: NEUROLOGICAL SURGERY
Payer: MEDICARE

## 2023-07-19 DIAGNOSIS — M89.9 SKULL LESION: ICD-10-CM

## 2023-07-19 LAB
CREAT SERPL-MCNC: 0.9 MG/DL (ref 0.3–1.3)
PERFORMED ON: NORMAL
POC SAMPLE TYPE: NORMAL

## 2023-07-19 PROCEDURE — 6360000004 HC RX CONTRAST MEDICATION: Performed by: NEUROLOGICAL SURGERY

## 2023-07-19 PROCEDURE — 70450 CT HEAD/BRAIN W/O DYE: CPT

## 2023-07-19 PROCEDURE — 70553 MRI BRAIN STEM W/O & W/DYE: CPT

## 2023-07-19 PROCEDURE — A9577 INJ MULTIHANCE: HCPCS | Performed by: NEUROLOGICAL SURGERY

## 2023-07-19 PROCEDURE — 82565 ASSAY OF CREATININE: CPT

## 2023-07-19 RX ADMIN — GADOBENATE DIMEGLUMINE 14 ML: 529 INJECTION, SOLUTION INTRAVENOUS at 10:33

## 2023-07-25 ENCOUNTER — OFFICE VISIT (OUTPATIENT)
Dept: NEUROLOGY | Age: 73
End: 2023-07-25
Payer: MEDICARE

## 2023-07-25 ENCOUNTER — OFFICE VISIT (OUTPATIENT)
Dept: NEUROSURGERY | Age: 73
End: 2023-07-25
Payer: MEDICARE

## 2023-07-25 VITALS
BODY MASS INDEX: 28.51 KG/M2 | OXYGEN SATURATION: 98 % | SYSTOLIC BLOOD PRESSURE: 144 MMHG | HEART RATE: 57 BPM | HEIGHT: 61 IN | DIASTOLIC BLOOD PRESSURE: 82 MMHG | WEIGHT: 151 LBS

## 2023-07-25 VITALS
DIASTOLIC BLOOD PRESSURE: 82 MMHG | HEART RATE: 62 BPM | RESPIRATION RATE: 18 BRPM | OXYGEN SATURATION: 99 % | BODY MASS INDEX: 28.51 KG/M2 | WEIGHT: 151 LBS | HEIGHT: 61 IN | SYSTOLIC BLOOD PRESSURE: 144 MMHG

## 2023-07-25 DIAGNOSIS — R41.3 MEMORY LOSS: Primary | ICD-10-CM

## 2023-07-25 DIAGNOSIS — I67.9 CEREBROVASCULAR SMALL VESSEL DISEASE: ICD-10-CM

## 2023-07-25 DIAGNOSIS — M89.9 SKULL LESION: Primary | ICD-10-CM

## 2023-07-25 DIAGNOSIS — H53.9 VISION CHANGES: ICD-10-CM

## 2023-07-25 DIAGNOSIS — R51.9 MILD HEADACHE: ICD-10-CM

## 2023-07-25 DIAGNOSIS — I63.9 CEREBROVASCULAR ACCIDENT (CVA), UNSPECIFIED MECHANISM (HCC): ICD-10-CM

## 2023-07-25 DIAGNOSIS — R94.8 ABNORMAL RADIONUCLIDE BONE SCAN: ICD-10-CM

## 2023-07-25 LAB
CRP SERPL HS-MCNC: 0.49 MG/DL (ref 0–0.5)
ERYTHROCYTE [SEDIMENTATION RATE] IN BLOOD BY WESTERGREN METHOD: 34 MM/HR (ref 0–25)

## 2023-07-25 PROCEDURE — 1123F ACP DISCUSS/DSCN MKR DOCD: CPT | Performed by: NURSE PRACTITIONER

## 2023-07-25 PROCEDURE — 99214 OFFICE O/P EST MOD 30 MIN: CPT | Performed by: NURSE PRACTITIONER

## 2023-07-25 PROCEDURE — 1123F ACP DISCUSS/DSCN MKR DOCD: CPT | Performed by: NEUROLOGICAL SURGERY

## 2023-07-25 PROCEDURE — 99213 OFFICE O/P EST LOW 20 MIN: CPT | Performed by: NEUROLOGICAL SURGERY

## 2023-07-25 ASSESSMENT — ENCOUNTER SYMPTOMS
RESPIRATORY NEGATIVE: 1
EYES NEGATIVE: 1
GASTROINTESTINAL NEGATIVE: 1

## 2023-07-25 NOTE — PROGRESS NOTES
Review of Systems   Constitutional: Negative. HENT: Negative. Eyes: Negative. Respiratory: Negative. Cardiovascular: Negative. Gastrointestinal: Negative. Genitourinary: Negative. Skin: Negative. Endo/Heme/Allergies: Negative. Psychiatric/Behavioral: Negative.
some evidence of previous infarctions within the basal ganglia and white matter of both cerebral a hemispheres. Becky Serna DO  [phone] 7/19/2023      Narrative & Impression  EXAMINATION: MAGNETIC RESONANCE IMAGING (MRI) OF THE BRAIN WITH AND WITHOUT CONTRAST     HISTORY: Skull lesion. TECHNIQUE: Multiplanar multi-weighted MRI of the brain and brainstem was performed with and without intravenous contrast using the general brain protocol. Contrast: 14 ml Multihance     COMPARISON: CT head 07/19/2023, MRI brain 04/17/2023     FINDINGS:     Parenchyma: No acute infarct. No acute hemorrhage. No enhancing intracranial lesion. No mass effect or midline shift. Craniocervical junction is normal.  Right parietal developmental venous anomaly, a normal variant. Chronic Change: Scattered and patchy foci of T2/FLAIR hyperintensity in the periventricular and subcortical white matter, which are nonspecific, however likely represent moderate chronic microvascular ischemia. No remote microhemorrhages. Mild   generalized cortical volume loss. Chronic lacunar infarct in the left lentiform nucleus. Ventricles/Extra-axial Spaces: Ventricles are normal in size and morphology for age. Normal basal cisterns. Sella/Skull Base: Partially empty sella. Paranasal Sinuses/Mastoids: Mucosal thickening and fluid in the left maxillary and ethmoid sinuses. Mastoid air cells are clear. Orbits: Bilateral artificial lens replacements. Vasculature: Normal flow voids in the carotid arteries and basilar artery. Calvarium/Scalp: 1.2 cm enhancing lesion in the left parietal calvarium with cortical thinning, axial image 131. No soft tissue swelling. Limited Cervical Spine: No significant abnormality. 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      Chronic lacunar infarct in the

## 2023-07-25 NOTE — PROGRESS NOTES
REVIEW OF SYSTEMS    Constitutional: []Fever []Sweat []Chills [] Recent Injury [x] Denies all unless marked  HEENT:[]Headache  [] Head Injury/Hearing Loss  [] Sore Throat  [] Ear Ache/Dizziness  [x] Denies all unless marked  Spine:  [] Neck pain  [x] Back pain  [] Sciaticia  [x] Denies all unless marked  Cardiovascular:[]Heart Disease []Chest Pain [] Palpitations  [x] Denies all unless marked  Pulmonary: []Shortness of Breath []Cough   [x] Denies all unless marke  Gastrointestinal: []Nausea  []Vomiting  []Abdominal Pain  []Constipation  []Diarrhea  []Dark Bloody Stools  [x] Denies all unless marked  Psychiatric/Behavioral:[] Depression [] Anxiety [x] Denies all unless marked  Genitourinary:   [] Frequency  [] Urgency  [] Incontinence [] Pain with Urination  [x] Denies all unless marked  Extremities: []Pain  []Swelling  [x] Denies all unless marked  Musculoskeletal: [] Muscle Pain  [] Joint Pain  [x] Arthritis [] Muscle Cramps [] Muscle Twitches  [x] Denies all unless marked  Sleep: [] Insomnia [] Snoring [] Restless Legs [] Sleep Apnea  [] Daytime Sleepiness  [x] Denies all unless marked  Skin:[] Rash [] Skin Discoloration [x] Denies all unless marked   Neurological: [x]Visual Disturbance/Memory Loss [x] Loss of Balance [] Slurred Speech/Weakness [] Seizures  [x] Vertigo/Dizziness [x] Denies all unless marked
(L) 05/19/2023    K 3.9 05/19/2023    CL 88 (L) 05/19/2023    CO2 25 05/19/2023    BUN 9 05/19/2023    CREATININE 0.9 07/19/2023    GLUCOSE 90 05/19/2023    CALCIUM 9.3 05/19/2023    PROT 6.7 05/19/2023    PROT 6.9 05/19/2023    LABALBU 3.72 (L) 05/19/2023    LABALBU 3.9 05/19/2023    BILITOT 0.5 05/19/2023    ALKPHOS 115 (H) 05/19/2023    AST 15 05/19/2023    ALT 8 05/19/2023    LABGLOM >60 07/19/2023     Lab Results   Component Value Date    CRP 0.49 07/25/2023    SEDRATE 34 (H) 07/25/2023        MRI BRAIN W WO CONTRAST    Result Date: 7/19/2023  EXAMINATION: MAGNETIC RESONANCE IMAGING (MRI) OF THE BRAIN WITH AND WITHOUT CONTRAST  HISTORY: Skull lesion. TECHNIQUE: Multiplanar multi-weighted MRI of the brain and brainstem was performed with and without intravenous contrast using the general brain protocol. Contrast: 14 ml Multihance  COMPARISON: CT head 07/19/2023, MRI brain 04/17/2023  FINDINGS:  Parenchyma: No acute infarct. No acute hemorrhage. No enhancing intracranial lesion. No mass effect or midline shift. Craniocervical junction is normal.  Right parietal developmental venous anomaly, a normal variant. Chronic Change: Scattered and patchy foci of T2/FLAIR hyperintensity in the periventricular and subcortical white matter, which are nonspecific, however likely represent moderate chronic microvascular ischemia. No remote microhemorrhages. Mild generalized cortical volume loss. Chronic lacunar infarct in the left lentiform nucleus. Ventricles/Extra-axial Spaces: Ventricles are normal in size and morphology for age. Normal basal cisterns. Sella/Skull Base: Partially empty sella. Paranasal Sinuses/Mastoids: Mucosal thickening and fluid in the left maxillary and ethmoid sinuses. Mastoid air cells are clear. Orbits: Bilateral artificial lens replacements. Vasculature: Normal flow voids in the carotid arteries and basilar artery.   Calvarium/Scalp: 1.2 cm enhancing lesion in the left parietal

## 2023-07-27 LAB — B2 MICROGLOB SERPL-MCNC: 3.4 MG/L

## 2023-07-29 LAB
ALBUMIN SERPL-MCNC: 4.15 G/DL (ref 3.75–5.01)
ALPHA1 GLOB SERPL ELPH-MCNC: 0.36 G/DL (ref 0.19–0.46)
ALPHA2 GLOB SERPL ELPH-MCNC: 0.76 G/DL (ref 0.48–1.05)
B-GLOBULIN SERPL ELPH-MCNC: 0.77 G/DL (ref 0.48–1.1)
DEPRECATED KAPPA LC FREE/LAMBDA SER: 1.62 {RATIO} (ref 0.26–1.65)
GAMMA GLOB SERPL ELPH-MCNC: 1.06 G/DL (ref 0.62–1.51)
IGA SERPL-MCNC: 183 MG/DL (ref 68–408)
IGG SERPL-MCNC: 887 MG/DL (ref 768–1632)
IGM SERPL-MCNC: 391 MG/DL (ref 35–263)
INTERPRETATION SERPL IFE-IMP: ABNORMAL
INTERPRETATION SERPL IFE-IMP: ABNORMAL
KAPPA LC FREE SER-MCNC: 46.44 MG/L (ref 3.3–19.4)
LAMBDA LC FREE SERPL-MCNC: 28.74 MG/L (ref 5.71–26.3)
PROT SERPL-MCNC: 7.1 G/DL (ref 6.3–8.2)

## 2023-08-07 ENCOUNTER — OFFICE VISIT (OUTPATIENT)
Dept: HEMATOLOGY | Age: 73
End: 2023-08-07
Payer: MEDICARE

## 2023-08-07 ENCOUNTER — HOSPITAL ENCOUNTER (OUTPATIENT)
Dept: INFUSION THERAPY | Age: 73
Discharge: HOME | End: 2023-08-07
Payer: MEDICARE

## 2023-08-07 ENCOUNTER — HOSPITAL ENCOUNTER (OUTPATIENT)
Dept: GENERAL RADIOLOGY | Age: 73
Discharge: HOME | End: 2023-08-07
Payer: MEDICARE

## 2023-08-07 VITALS
BODY MASS INDEX: 28.4 KG/M2 | SYSTOLIC BLOOD PRESSURE: 136 MMHG | TEMPERATURE: 97.5 F | HEART RATE: 61 BPM | OXYGEN SATURATION: 97 % | HEIGHT: 61 IN | WEIGHT: 150.4 LBS | DIASTOLIC BLOOD PRESSURE: 62 MMHG

## 2023-08-07 DIAGNOSIS — M89.9 LESION OF LEFT PARIETAL BONE: ICD-10-CM

## 2023-08-07 DIAGNOSIS — Z71.89 COORDINATION OF COMPLEX CARE: ICD-10-CM

## 2023-08-07 DIAGNOSIS — Z71.89 CARE PLAN DISCUSSED WITH PATIENT: ICD-10-CM

## 2023-08-07 DIAGNOSIS — R94.8 ABNORMAL RADIONUCLIDE SCAN: ICD-10-CM

## 2023-08-07 DIAGNOSIS — M89.9 LYTIC BONE LESIONS ON XRAY: Primary | ICD-10-CM

## 2023-08-07 LAB
ERYTHROCYTE [DISTWIDTH] IN BLOOD BY AUTOMATED COUNT: 14.6 % (ref 11.7–14.4)
HCT VFR BLD AUTO: 33.6 % (ref 34.1–44.9)
HGB BLD-MCNC: 12.2 G/DL (ref 11.2–15.7)
LYMPHOCYTES # BLD: 2.53 K/UL (ref 1.18–3.74)
LYMPHOCYTES NFR BLD: 36.4 % (ref 19.3–53.1)
MCH RBC QN AUTO: 32.4 PG (ref 25.6–32.2)
MCHC RBC AUTO-ENTMCNC: 36.3 G/DL (ref 32.3–35.5)
MCV RBC AUTO: 89.4 FL (ref 79.4–94.8)
MONOCYTES # BLD: 0.78 K/UL (ref 0.24–0.82)
MONOCYTES NFR BLD: 11.2 % (ref 4.7–12.5)
NEUTROPHILS # BLD: 3.39 K/UL (ref 1.56–6.13)
NEUTS SEG NFR BLD: 48.8 % (ref 34–71.1)
PLATELET # BLD AUTO: 171 K/UL (ref 182–369)
PMV BLD AUTO: 10 FL (ref 7.4–10.4)
RBC # BLD AUTO: 3.76 M/UL (ref 3.93–5.22)
WBC # BLD AUTO: 6.95 K/UL (ref 3.98–10.04)

## 2023-08-07 PROCEDURE — 36415 COLL VENOUS BLD VENIPUNCTURE: CPT

## 2023-08-07 PROCEDURE — 99213 OFFICE O/P EST LOW 20 MIN: CPT | Performed by: INTERNAL MEDICINE

## 2023-08-07 PROCEDURE — 1123F ACP DISCUSS/DSCN MKR DOCD: CPT | Performed by: INTERNAL MEDICINE

## 2023-08-07 PROCEDURE — 77075 RADEX OSSEOUS SURVEY COMPL: CPT

## 2023-08-07 PROCEDURE — 85025 COMPLETE CBC W/AUTO DIFF WBC: CPT

## 2023-08-07 PROCEDURE — 99212 OFFICE O/P EST SF 10 MIN: CPT

## 2023-08-08 ENCOUNTER — OFFICE VISIT (OUTPATIENT)
Dept: CARDIOLOGY CLINIC | Age: 73
End: 2023-08-08
Payer: MEDICARE

## 2023-08-08 VITALS
HEIGHT: 61 IN | OXYGEN SATURATION: 100 % | SYSTOLIC BLOOD PRESSURE: 162 MMHG | DIASTOLIC BLOOD PRESSURE: 84 MMHG | WEIGHT: 150 LBS | HEART RATE: 56 BPM | BODY MASS INDEX: 28.32 KG/M2

## 2023-08-08 DIAGNOSIS — R53.83 OTHER FATIGUE: ICD-10-CM

## 2023-08-08 DIAGNOSIS — I10 ESSENTIAL HYPERTENSION: ICD-10-CM

## 2023-08-08 DIAGNOSIS — R06.02 SHORTNESS OF BREATH: Primary | ICD-10-CM

## 2023-08-08 DIAGNOSIS — R06.09 DOE (DYSPNEA ON EXERTION): ICD-10-CM

## 2023-08-08 DIAGNOSIS — Z82.49 FAMILY HISTORY OF CORONARY ARTERY DISEASE: ICD-10-CM

## 2023-08-08 PROCEDURE — 1123F ACP DISCUSS/DSCN MKR DOCD: CPT | Performed by: NURSE PRACTITIONER

## 2023-08-08 PROCEDURE — 93000 ELECTROCARDIOGRAM COMPLETE: CPT | Performed by: NURSE PRACTITIONER

## 2023-08-08 PROCEDURE — 99204 OFFICE O/P NEW MOD 45 MIN: CPT | Performed by: NURSE PRACTITIONER

## 2023-08-08 PROCEDURE — 3079F DIAST BP 80-89 MM HG: CPT | Performed by: NURSE PRACTITIONER

## 2023-08-08 PROCEDURE — 3077F SYST BP >= 140 MM HG: CPT | Performed by: NURSE PRACTITIONER

## 2023-08-08 RX ORDER — AMLODIPINE BESYLATE 5 MG/1
5 TABLET ORAL DAILY
Qty: 30 TABLET | Refills: 5 | Status: SHIPPED | OUTPATIENT
Start: 2023-08-08

## 2023-08-08 NOTE — PROGRESS NOTES
Dear ALEJANDRA Garcia - JEROMY & ALEJANDRA Cervantes NP,    Thank you for allowing me to participate in the care of Ms. Preston Edge. She presents today at the [id number] [facility]. As you know, Ms. Landon Marie is a 72 y.o. female with history of hypertension, hyperlipidemia, COPD, GERD, and restless syndrome syndrome who presents with the chief complaint of high blood pressure. All records from PCP have been reviewed. Office note with Lanre Hernandez from 7/5/23 reviewed. She tells me she been having history of high blood pressure for a while. HCTZ was recently added within the last couple months. Blood pressures continue daily. She denies any chest pain. She does have BENSON and continues to smoke a half a pack per day. She denies any fast or slow heart rhythms. She does have a significant family history of heart disease with her dad having a heart attack in his 45s as well as her son in his 45s. she is sleeping on 1 pillow at night. she is not waking gasping for air. she denies any swelling. she has been compliant with her medications. her BP has been controlled. PCP follows labs and lipids. She otherwise denies chest pain, PND, orthopnea, syncope, or near syncope. She has no other complaints. Review of Systems   Constitutional: Positive for fatigue. Negative for fever, chills, diaphoresis, activity change, appetite change,  and unexpected weight change. Eyes: Negative for photophobia, pain, redness and visual disturbance. Respiratory: Positive for BENSON. Negative for apnea, cough, chest tightness, shortness of breath, wheezing and stridor. Cardiovascular: Negative for chest pain, palpitations and leg swelling. Gastrointestinal: Negative for abdominal distention. Genitourinary: Negative for dysuria, urgency and frequency. Musculoskeletal: Negative for myalgias, arthralgias and gait problem. Skin: Negative for color change, pallor, rash and wound.    Neurological: Negative for dizziness,

## 2023-08-08 NOTE — PATIENT INSTRUCTIONS
[place] at the [facility] and [facility] located on the first floor of [address] [place] [address] entrance and turn immediately to your left. Date/Time:      Dobutamine Stress Test    A chemical stress test uses chemical agents injected into the body through the vein. These chemicals make the heart function as if it were under stress. A chemical stress test is used when a traditional stress test (called a cardiac stress test) cannot be done. Testing will take approximately one hour. Dobutamine Stress Echocardiogram Instructions:     No caffeine 24 hours prior to the testing. This includes: coffee, pop/soda, chocolate, cold medications, etc.  Any product that might contain caffeine. Do not eat or drink anything, except water, eight (8) hours before the test.   No alcohol or nicotine twelve (12) hours prior to your test.   Wear comfortable clothing. If you are taking Metoprolol (Lopressor, Toprol), Carvedilol (Coreg), Atenolol (Tenformin), Nebivolol (Bystolic), Propranolol (Innderall), or Sotalol (Betapace) do not take the day before or morning of this procedure. If you need to change this appointment, please call outpatient scheduling at [phone]. [place] at the [facility] and [facility] located on the first floor of [address] [place] [address] entrance and turn immediately to your left. Date/Time:     Patient's contact number:  [phone] (home)     Echocardiogram -  No prep. Takes approximately 30 min. An echocardiogram uses sound waves to produce images of your heart. This commonly used test allows your doctor to see how your heart is beating and pumping blood. Your doctor can use the images from an echocardiogram to identify various abnormalities in the heart muscle and valves. This test has 2 parts: You will be asked to disrobe from the waist up and given a gown to wear.  The technologist

## 2023-08-09 ENCOUNTER — TELEPHONE (OUTPATIENT)
Dept: RADIATION ONCOLOGY | Facility: HOSPITAL | Age: 73
End: 2023-08-09

## 2023-08-13 NOTE — PROGRESS NOTES
[facility]      Jose Alberto Wilson APRN  ________________________________________  [facility]  Department of Radiation Oncology  [address] [place] [id number]  Office:  [phone]  Fax: [phone]    DATE:  08/16/2023  PATIENT:  Deanna Brown 1950                 MEDICAL RECORD #:  [id number]                                                       REASON FOR VISIT  Chief Complaint   Patient presents with    Lytic lesion of skull     Deanna Brown is a very pleasant female that has been referred to our office for radiotherapy considerations. Denies activity change, appetite change, unexpected weight change, nausea/vomiting, diarrhea, light-headedness, weakness, and headaches. She follows .     History of Present Illness:  04/17/2023 - MRI Brain with and without contrast due to memory change:  Findings compatible with chronic microvascular ischemic change.   [place] cystic focus within the left frontoparietal region may represent sequela of remote infarct.   No diffusion restriction is identified to suggest acute infarct.   Developmental venous anomaly within the right parietal lobe.   Indeterminate 1.3 cm enhancing lesion within the left parietal bone. Neoplastic lesion cannot be excluded. Nuclear bone scan may be useful for further characterization of this lesion.     05/01/2023 - Bone Scan:  Seen best on the lateral view of the skull, there is a small focus of increased radiotracer accumulation which likely corresponds to the enhancing lesion of the left parietal bone seen on recent brain MRI from 4/17/2023. This finding remains indeterminate. Neoplastic lesion cannot be excluded. Additional multifocal radiotracer accumulation is seen within the thoracic and lumbar spine. Osseous metastatic disease cannot be excluded. Multiple compression fractures within the spine could produce a similar appearance.Further evaluation with MRI of the thoracic and  lumbar spine with and without IV contrast may be beneficial. If patient does not have a known history of malignancy, further evaluation with contrast-enhanced CT of the chest, abdomen, and pelvis may also be considered.     05/19/2023 - MRI Lumbar Spine with and without contrast:  Small area of abnormal signal measuring up to 1.3 x 1.1 cm involving the right side of the L3 vertebral body, which may represent an atypical hemangioma. However, small metastasis is also within the differential diagnosis. Follow-up MRI of the lumbar spine with and without contrast can be performed for further evaluation as clinically warranted.   Endplate marrow edema at L2-L3 cyst with Modic type I endplate marrow signal change.   Multilevel spondylosis of the lumbar spine, causing varying degrees of neuroforaminal and spinal  canalstenosis described above.     05/22/2023 - MRI Thoracic Spine with and without contrast:  Moderate thoracic spondylosis is present.Discs are desiccated, and there is multilevel disc height loss.Multilevel degenerative marrow signal changes are seen, predominantly Modic type II.   Multiple degenerative Schmorls nodes are also visualized.  The marrow signal is otherwise unremarkable.  Specifically, no suspicious, enhancing lesions to suggest osseous metastatic disease.  No vertebral compression fractures or marrow edema identified.   As such, the spinal findings on bone scan from 5/1/2023 may be degenerative in etiology.   There is posterior disc bulging and facet arthropathy at T8-T9, T9-T10, and   T10-T11 which contributes to bilateral neuroforaminal narrowing at these levels.   There is mild spinal canal narrowing at the T9-T10 level.     05/22/2023 - CT Chest/Abdomen/Pelvis with and without contrast:  No acute abnormality is identified within the chest, abdomen, or pelvis.   Pulmonary emphysema.Reticular opacities of the bilateral upper lobes, left greater than right.This is thought to be related to  scarring. Consider follow up chest CT in 3-6 months as the left upper lobe scarring appears somewhat nodular. Patient may also benefit from annual surveillance with low-dose chest CT.   Colonic diverticulosis.   Skeletal findings as detailed above. No suspicious osseous lesion is seen. Please refer to the thoracic spine MRI from the same date and the lumbar spine MRI from 5/19/2023 for additional details.     07/19/2023 - CT Head without contrast:  No acute intracranial abnormality.   Single semi lucent lesion in the left parietal calvarium; with differential to include venous lake, hemangioma, or myeloma/metastasis.     07/19/2023 - MRI Brain with and without contrast:  Indeterminate 1.2 cm lesion in the left parietal calvarium with plasmacytoma and/or metastatic disease not excluded, similar to prior MRI.   Chronic lacunar infarct in the left lentiform nucleus.     07/25/2023 - Consultation with Dr. Oliveira:  We discussed the results of the repeat MRI brain and explained that the lesion is essentially unchanged when compared to the films back in April, the lesion is actually enhancing less and may be slightly smaller.  We are going to recommend continuing to watch this. We did explain the type of surgery she would need if she were to move forward. She wants to hold off.   -Repeat MRI brain in 4 months   -Follow up after films     08/07/2023 - Bone Survey:  Findings / Impression:   Heart size normal.  Lungs clear.  Aortic arch calcific atherosclerosis.  Left parietal bone lesionseen on the lateral radiograph of the skull measuring 1.2 cm. No additional lucent lesion in the axial or appendicular skeleton.    Severe degenerative changes throughout the spine.  Vascular calcifications.  Phleboliths in the pelvis.  Recommend follow-up head CT and several months to evaluate for change/stability of the left parietal bone lesion.     08/07/2023 - Appointment with :  Left parietal bone lesion, April 2023 4/17/23  MRI brain: Findings compatible with chronic microvascular ischemic change. [place] cystic focus within the left frontoparietal region may represent sequela of remote infarct. No diffusion restriction is identified to suggest acute infarct. Developmental venous anomaly within the right parietal lobe. Indeterminate 1.3 cm enhancing lesion within the left parietal bone. Neoplastic lesion cannot be excluded. Nuclear bone scan may be useful for further characterization of this lesion.  5/1/23 Bone scan: Seen best on the lateral view of the skull, there is a small focus of increased radiotracer accumulation which likely corresponds to the enhancing lesion of the left parietal bone seen on recent brain MRI from  4/17/2023.This finding remains indeterminate. Neoplastic lesion cannot be excluded. Additional multifocal radiotracer accumulation is seen within the thoracic and lumbar spine. Osseous metastatic disease cannot be excluded. Multiple compression fractures within the spine could produce a similar appearance. Further evaluation with MRI of the thoracic and lumbar spine with and without IV contrast may be beneficial. If patient does not have a known history of malignancy, further evaluation with contrast-enhanced CT of the chest, abdomen, and pelvis may also be considered.  5/19/23 MM Labs: [place] 40.60, Lambda 28.08,Kappa/Lambda 1.45, IgA 189, IgG 877, IgM 369, M-Harjit-Normal SPEP. Normal SPEP pattern. LUCA gel shows a normal pattern; no monoclonal proteins seen.  5/23/23 CT Chest W WO Contrast: No acute abnormality is identified within the chest, abdomen, or pelvis. Pulmonary emphysema.Reticular opacities of the bilateral upper lobes, left greater than right.This is thought to be related to scarring.Consider follow up chest CT in 3-6 months as the left upper lobe scarring appears somewhat nodular.Patient may also benefit from annual surveillance with low-dose chest CT. Colonic diverticulosis. Skeletal findings as  detailed above.No suspicious osseous lesion is seen.  5/23/23 CT Abd/Pelvis W WO Contrast: No acute abnormality is identified within the chest, abdomen, or pelvis. Pulmonary emphysema.Reticular opacities of the bilateral upper lobes, left greater than right.This is thought to be related to scarring.Consider follow up chest CT in 3-6 months as the left upper lobe scarring appears somewhat nodular.Patient may also benefit from annual surveillance with low-dose chest CT. Colonic diverticulosis. Skeletal findings as detailed above.No suspicious osseous lesion is seen.  5/23/23 MRI Thoracic Spine W WO Contrast: Moderate thoracic spondylosis is present.Discs are desiccated, and there is multilevel disc height loss.Multilevel degenerative marrow signal changes are seen, predominantly Modic type II.Multiple degenerative Schmorls nodes are also visualized.The marrow signal is otherwise unremarkable.Specifically, no suspicious, enhancing lesions to suggest osseous metastatic disease.No vertebral compression fractures or marrow edema identified.As such, the spinal findings on bone scan from 5/1/2023 may be degenerative in etiology. There is posterior disc bulging and facet arthropathy at T8-T9, T9-T10, and T10-T11 which contributes to bilateral neuroforaminal narrowing at these levels. There is mild spinal canal narrowing at the T9-T10 level.  5/30/23 MRI Lumbar Spine W WO Contrast: Small area of abnormal signal measuring up to 1.3 x 1.1 cm involving the right side of the L3 vertebral body, which may represent an atypical hemangioma. However, small metastasis is also within the differential diagnosis.Follow-up MRI of the lumbar spine with and without contrast can be performed for further evaluation as clinically warranted. Endplate marrow edema at L2-L3 cyst with Modic type I endplate marrow signal change. Multilevel spondylosis of the lumbar spine, causing varying degrees of neuroforaminal and spinal canal stenosis described  above.  6/5/2023-essentially, no evidence of bone metastatic disease in the C/T/L-spine. Evidence of significant degenerative disease. At this point, the plan is to repeat MRI brain July 19, 2023. If interval growth then will will consider biopsy. Otherwise, continue watch/wait surveillance imaging.  07/25/23- Dr Oliveira, neurosurgery consultation.:MRI brain and explained that the lesion is essentially unchanged when compared to the films back in April, the lesion is actually enhancing less and may be slightly smaller. We are going to recommend continuing to watch this. We did explain the type of surgery she would need if she were to move forward. She wants to hold off.  8/7/2023-I reviewed results of imaging studies as above as well as recommendation from neurosurgery, Dr. Oliveira. I also called Dr. Willy Silva, [facility] radiation oncology and discussed the case. Dr. Silva would like to see the patient for further recommendations. We will place today to radiation oncology, [facility].  Thoracic/lumbar spine lesions, May 2023  4/17/23 MRI brain: Findings compatible with chronic microvascular ischemic change. [place] cystic focus within the left frontoparietal region may represent sequela of remote infarct. No diffusion restriction is identified to suggest acute infarct. Developmental venous anomaly within the right parietal lobe. Indeterminate 1.3 cm enhancing lesion within the left parietal bone. Neoplastic lesion cannot be excluded. Nuclear bone scan may be useful for further characterization of this lesion.  5/1/23 Bone scan: Seen best on the lateral view of the skull, there is a small focus of increased radiotracer accumulation which likely corresponds to the enhancing lesion of the left parietal bone seen on recent brain MRI from 4/17/2023.This finding remains indeterminate. Neoplastic lesion cannot be excluded. Additional multifocal radiotracer accumulation is seen within the thoracic and lumbar spine. Osseous metastatic  disease cannot be excluded. Multiple compression fractures within the spine could produce a similar appearance. Further evaluation with MRI of the thoracic and lumbar spine with and without IV contrast may be beneficial. If patient does not have a known history of malignancy, further evaluation with contrast-enhanced CT of the chest, abdomen, and pelvis may also be considered.  5/23/23 CT Chest W WO Contrast: No acute abnormality is identified within the chest, abdomen, or pelvis. Pulmonary emphysema.Reticular opacities of the bilateral upper lobes, left greater than right.This is thought to be related to scarring.Consider follow up chest CT in 3-6 months as the left upper lobe scarring appears somewhat nodular.Patient may also benefit from annual surveillance with low-dose chest CT. Colonic diverticulosis. Skeletal findings as detailed above.No suspicious osseous lesion is seen.  5/23/23 CT Abd/Pelvis W WO Contrast: No acute abnormality is identified within the chest, abdomen, or pelvis. Pulmonary emphysema.Reticular opacities of the bilateral upper lobes, left greater than right.This is thought to be related to scarring.Consider follow up chest CT in 3-6 months as the left upper lobe scarring appears somewhat nodular.Patient may also benefit from annual surveillance with low-dose chest CT. Colonic diverticulosis. Skeletal findings as detailed above.No suspicious osseous lesion is seen.  5/23/23 MRI Thoracic Spine W WO Contrast: Moderate thoracic spondylosis is present.Discs are desiccated, and there is multilevel disc height loss.Multilevel degenerative marrow signal changes are seen, predominantly Modic type II.Multiple degenerative Schmorls nodes are also visualized.The marrow signal is otherwise unremarkable.Specifically, no suspicious, enhancing lesions to suggest osseous metastatic disease.No vertebral compression fractures or marrow edema identified.As such, the spinal findings on bone scan from 5/1/2023 may  be degenerative in etiology. There is posterior disc bulging and facet arthropathy at T8-T9, T9-T10, and T10-T11 which contributes to bilateral neuroforaminal narrowing at these levels. There is mild spinal canal narrowing at the T9-T10 level.  5/30/23 MRI Lumbar Spine W WO Contrast: Small area of abnormal signal measuring up to 1.3 x 1.1 cm involving the right side of the L3 vertebral body, which may represent an atypical hemangioma. However, small metastasis is also within the differential diagnosis.Follow-up MRI of the lumbar spine with and without contrast can be performed for further evaluation as clinically warranted. Endplate marrow edema at L2-L3 cyst with Modic type I endplate marrow signal change. Multilevel spondylosis of the lumbar spine, causing varying degrees of neuroforaminal and spinal canal stenosis described above.  6/5/2023-essentially, no evidence of bone metastatic disease in the C/T/L-spine. Evidence of significant degenerative disease. At this point, the plan is to repeat MRI brain July 19, 2023. If interval growth then will will consider biopsy. Otherwise, continue watch/wait surveillance imaging.  7/25/23 MM Labs: Kappa 46.44, Lambda 28.74, Kappa/Lambda 1.62, IgA 183, IgG 887, IgM 391, M-Harjit: Normal SPEP.  07/25/23- Dr Oliveira, neurosurgery consultation.:MRI brain and explained that the lesion is essentially unchanged when compared to the films back in April, the lesion is actually enhancing less and may be slightly smaller. We are going to recommend continuing to watch this. We did explain the type of surgery she would need if she were to move forward. She wants to hold off.  8/7/2023-I reviewed results of imaging studies as above as well as recommendation from neurosurgery, Dr. Oliveira. I also called Dr. Willy Silva, [facility] radiation oncology and discussed the case. Dr. Silva would like to see the patient for further recommendations. We will place today to radiation oncology, [facility]. We will  "order bone survey today.  Plan:  -Referral Dr Silva for further evaluation of isolated skull lesion, concern for isolated plasmacytoma or metastatic disease. No other imaging evidence of metastasis.  PLAN:  RTC with MD after 11/21/23 MRI  Bone survey today  Refer to Dr Silva/[facility] for evaluation of suspicious 1.2 cm lesion in the left parietal calvarium  Continue follow-up with Dr Fish Oliveira/[facility], 11/28/23  Continue follow-up with TRINITY Fisher/[facility], 1/25/24  Follow-up with PCP for hyponatremia.  Follow Up  Return in 4 months (on 12/1/2023) for CBC, Appointment with Dr. Molina-after 11/21/23 MRI brain.  Bone survey today downstairs  Refer to Dr Silva/[facility] for evaluation of suspicious 1.2 cm lesion in the left parietal calvarium        History obtained from  PATIENT, FAMILY, and CHART    PAST MEDICAL HISTORY  Past Medical History:   Diagnosis Date    Asthma     COPD (chronic obstructive pulmonary disease)     Gastric ulcer     Hypertension     Memory changes       PAST SURGICAL HISTORY  Past Surgical History:   Procedure Laterality Date    CARPAL TUNNEL RELEASE Right     CATARACT EXTRACTION      CHOLECYSTECTOMY      HYSTERECTOMY      TONSILLECTOMY        FAMILY HISTORY  family history includes Cancer in her son; Heart attack in her father, mother, and son; Ulcerative colitis in her daughter.    SOCIAL HISTORY  Social History     Tobacco Use    Smoking status: Every Day     Packs/day: 0.50     Years: 50.00     Pack years: 25.00     Types: Cigarettes     Start date: 1973    Smokeless tobacco: Never    Tobacco comments:     \"I've thought about quitting\"   Substance Use Topics    Alcohol use: Never    Drug use: Never     ALLERGIES  Atorvastatin and Penicillins     MEDICATIONS    Current Outpatient Medications:     albuterol sulfate  (90 Base) MCG/ACT inhaler, Inhale 2 puffs Every 6 (Six) Hours As Needed., Disp: , Rfl:     ASPIRIN 81 PO, " Take 1 tablet by mouth Daily., Disp: , Rfl:     dilTIAZem CD (CARDIZEM CD) 120 MG 24 hr capsule, Take 1 capsule by mouth Daily., Disp: , Rfl:     esomeprazole (nexIUM) 40 MG capsule, Take 1 capsule by mouth Every Morning Before Breakfast., Disp: , Rfl:     hydroCHLOROthiazide (MICROZIDE) 12.5 MG capsule, Take 1 capsule by mouth Every Morning., Disp: , Rfl:     losartan (COZAAR) 100 MG tablet, Take 1 tablet by mouth Daily., Disp: , Rfl:     montelukast (SINGULAIR) 10 MG tablet, Take 1 tablet by mouth Every Night., Disp: , Rfl:     nebivolol (BYSTOLIC) 20 MG tablet, Take 1 tablet by mouth 2 (Two) Times a Day., Disp: , Rfl:     Trelegy Ellipta 100-62.5-25 MCG/ACT inhaler, Inhale 1 puff Daily., Disp: , Rfl:     Current outpatient and discharge medications have been reconciled for the patient.  Reviewed by: Willy Silva III, MD    The following portions of the patient's history were reviewed and updated as appropriate: allergies, current medications, past family history, past medical history, past social history, past surgical history and problem list.    REVIEW OF SYSTEMS  Review of Systems   Constitutional: Negative.  Negative for appetite change, fatigue and unexpected weight change.   HENT:  Negative.     Respiratory: Negative.  Negative for cough, hemoptysis and shortness of breath.    Cardiovascular: Negative.  Negative for chest pain.   Gastrointestinal: Negative.    Genitourinary: Negative.     Musculoskeletal: Negative.    Skin: Negative.    Neurological: Negative.  Negative for dizziness, extremity weakness and headaches.   Hematological: Negative.  Negative for adenopathy.   Psychiatric/Behavioral: Negative.     All other systems reviewed and are negative.    I have reviewed and confirmed the accuracy of the ROS as documented by the MA/LPN/RN Willy Silva III, MD     PHYSICAL EXAM  Vital Signs:   Vitals:    08/16/23 1114   BP: 154/66   Pulse: 54   Resp: 18   SpO2: 98%   Weight: 67.6 kg (149 lb)   Height:  "154.9 cm (61\")   PainSc: 0-No pain     Physical Exam  Vitals reviewed.   Constitutional:       Appearance: Normal appearance.   HENT:      Head: Normocephalic.   Cardiovascular:      Rate and Rhythm: Normal rate and regular rhythm.      Pulses: Normal pulses.      Heart sounds: Normal heart sounds.   Pulmonary:      Effort: Pulmonary effort is normal. No respiratory distress.      Breath sounds: Normal breath sounds.   Abdominal:      General: Bowel sounds are normal.   Musculoskeletal:         General: Normal range of motion.      Cervical back: Normal range of motion and neck supple.   Skin:     General: Skin is warm.      Capillary Refill: Capillary refill takes less than 2 seconds.   Neurological:      General: No focal deficit present.      Mental Status: She is alert and oriented to person, place, and time.   Psychiatric:         Mood and Affect: Mood normal.         Behavior: Behavior normal.       Performance Status: ECOG (0) Fully active, able to carry on all predisease performance without restriction    Clinical Quality Measures  -Pain Documented by Standardized Tool, FPS  Deanna Brown reports a pain score of 0. Given her pain assessment as noted, treatment options were discussed and the following options were decided upon as a follow-up plan to address the patient's pain:  No pain, no plan given .  Pain Medications               ASPIRIN 81 PO Take 1 tablet by mouth Daily.          -Advanced Care Planning Advance Care Planning   ACP discussion was held with the patient during this visit. Patient does not have an advance directive, information provided.     -Body Mass Index Screening and Follow-Up Plan BMI is >= 25 and <30. (Overweight) The following options were offered after discussion;: none (medical contraindication)    -Tobacco Use: Screening and Cessation Intervention Social History    Tobacco Use      Smoking status: Every Day        Packs/day: 0.50        Years: 50.00        Pack years: 25        " "Types: Cigarettes        Start date: 1973      Smokeless tobacco: Never      Tobacco comments: \"I've thought about quitting\"   Smoking cessation information given in after visit summary.    ASSESSMENT AND PLAN  1. Lytic bone lesions on xray    2. Lytic lesion of bone on x-ray    3. Current every day smoker      Orders Placed This Encounter   Procedures    Ambulatory Referral to ONC Social Work      RECOMMENDATIONS: Deanna Brown has been referred to our office today to discuss radiotherapy recommendations for lesion of the parietal calvarium.     We have discussed the indications and rationale of radiation therapy according to the NCCN Guidelines. I have extensively reviewed the risks, benefits and alternatives of therapy and progression of disease in spite of therapy with either local or systemic failure.  The option of definitive surgery has also been reviewed as well as surveillance.  I have seen, examined and reviewed her medication list, appropriate labs and imaging studies as well as other physician notes. We discussed the goals/plans of care and answered all questions.     After consideration of the diagnostic data and evaluation of the patient, I have recommended to continue close follow up with neurosurgery with surveillance scans. She is scheduled for a repeat MRI in November 2023.     The patient and her family verbalize understanding of this discussion, voice no further questions and wish to proceed with recommendations. Continue ongoing management per primary care physician and other specialists. She will return as needed.     Deanna Brown  reports that she has been smoking cigarettes. She started smoking about 50 years ago. She has a 25.00 pack-year smoking history. She has never used smokeless tobacco. I have educated her on the risk of diseases from using tobacco products such as cancer, COPD, and heart disease. I advised her to quit and she is not willing to quit. I spent >10 minutes counseling the " patient.    Thank you for allowing me to assist in this patients care.    Patient Instructions   1) no radiation at this time  2) Return as needed    Time Spent: I spent 58 minutes caring for Deanna on this date of service. This time includes time spent by me in the following activities: preparing for the visit, reviewing tests, obtaining and/or reviewing a separately obtained history, performing a medically appropriate examination and/or evaluation, counseling and educating the patient/family/caregiver, ordering medications, tests, or procedures, referring and communicating with other health care professionals, documenting information in the medical record, independently interpreting results and communicating that information with the patient/family/caregiver, and care coordination.   Willy Silva III, MD   08/16/2023

## 2023-08-14 ENCOUNTER — TELEPHONE (OUTPATIENT)
Dept: CARDIOLOGY CLINIC | Age: 73
End: 2023-08-14

## 2023-08-14 NOTE — TELEPHONE ENCOUNTER
PT called stating since she started the amlodipine she has had swelling around her waist and legs. Pt took it upon her self to stop the amlodipine and states the swelling got better. PT BP is 130/68 off the amlodipine.

## 2023-08-15 ENCOUNTER — HOSPITAL ENCOUNTER (OUTPATIENT)
Dept: RADIATION ONCOLOGY | Facility: HOSPITAL | Age: 73
Setting detail: RADIATION/ONCOLOGY SERIES
End: 2023-08-15
Payer: MEDICARE

## 2023-08-15 RX ORDER — DILTIAZEM HYDROCHLORIDE 120 MG/1
120 CAPSULE, COATED, EXTENDED RELEASE ORAL DAILY
Qty: 30 CAPSULE | Refills: 3 | Status: SHIPPED | OUTPATIENT
Start: 2023-08-15

## 2023-08-15 NOTE — TELEPHONE ENCOUNTER
Continue to stop Norvasc. Start Cardizem 120 mg daily. Please find out which pharmacy that she would like this sent to.

## 2023-08-16 ENCOUNTER — CONSULT (OUTPATIENT)
Dept: RADIATION ONCOLOGY | Facility: HOSPITAL | Age: 73
End: 2023-08-16
Payer: MEDICARE

## 2023-08-16 ENCOUNTER — DOCUMENTATION (OUTPATIENT)
Dept: RADIATION ONCOLOGY | Facility: HOSPITAL | Age: 73
End: 2023-08-16
Payer: MEDICARE

## 2023-08-16 VITALS
SYSTOLIC BLOOD PRESSURE: 154 MMHG | DIASTOLIC BLOOD PRESSURE: 66 MMHG | HEIGHT: 61 IN | HEART RATE: 54 BPM | WEIGHT: 149 LBS | RESPIRATION RATE: 18 BRPM | BODY MASS INDEX: 28.13 KG/M2 | OXYGEN SATURATION: 98 %

## 2023-08-16 DIAGNOSIS — M89.9 LYTIC LESION OF BONE ON X-RAY: ICD-10-CM

## 2023-08-16 DIAGNOSIS — F17.200 CURRENT EVERY DAY SMOKER: ICD-10-CM

## 2023-08-16 DIAGNOSIS — M89.9 LYTIC BONE LESIONS ON XRAY: Primary | ICD-10-CM

## 2023-08-16 PROCEDURE — G0463 HOSPITAL OUTPT CLINIC VISIT: HCPCS | Performed by: RADIOLOGY

## 2023-08-16 RX ORDER — ALBUTEROL SULFATE 90 UG/1
2 AEROSOL, METERED RESPIRATORY (INHALATION) EVERY 6 HOURS PRN
COMMUNITY

## 2023-08-16 RX ORDER — FLUTICASONE FUROATE, UMECLIDINIUM BROMIDE AND VILANTEROL TRIFENATATE 100; 62.5; 25 UG/1; UG/1; UG/1
1 POWDER RESPIRATORY (INHALATION)
COMMUNITY
Start: 2023-07-24

## 2023-08-16 RX ORDER — ESOMEPRAZOLE MAGNESIUM 40 MG/1
40 CAPSULE, DELAYED RELEASE ORAL
COMMUNITY
Start: 2023-07-24

## 2023-08-16 RX ORDER — DILTIAZEM HYDROCHLORIDE 120 MG/1
120 CAPSULE, COATED, EXTENDED RELEASE ORAL DAILY
COMMUNITY
Start: 2023-08-15

## 2023-08-16 RX ORDER — MONTELUKAST SODIUM 10 MG/1
10 TABLET ORAL NIGHTLY
COMMUNITY
Start: 2023-07-24

## 2023-08-16 RX ORDER — LOSARTAN POTASSIUM 100 MG/1
100 TABLET ORAL DAILY
COMMUNITY
Start: 2023-07-24

## 2023-08-16 RX ORDER — NEBIVOLOL 20 MG/1
20 TABLET ORAL 2 TIMES DAILY
COMMUNITY
Start: 2023-07-24

## 2023-08-16 RX ORDER — HYDROCHLOROTHIAZIDE 12.5 MG/1
1 CAPSULE, GELATIN COATED ORAL EVERY MORNING
COMMUNITY
Start: 2023-05-03

## 2023-08-16 NOTE — PROGRESS NOTES
"DWIGHT met with Mrs. Brown who is here for a radiation consultation for lytic bone lesions on x-ray. DWIGHT introduced self and explained role and source of support. She is 73 years old and lives with her spouse. Her support system includes her spouse and three adult children. Her main concern is "not knowing" what will be recommended. Emotional support provided and encouraged her to express her feelings. If radiation is recommended, transportation may be a concern.  and Mr. Brown no longer drive, and she depends on her daughter to drive them to appointments. Her daughter's hours are 10:00 to 6:00 and they live about 30 miles away. DWIGHT did speak to them regarding possible transportation assistance.   Her 47-year-old son recently had a heart attack, and his doctors are still trying to figure out his treatment plan. Validation provided. She is independent with her ADLs, but states she is easily fatigued, and has been more forgetful. Mrs. Brown states her appetite has decreased. She does not take any medication for anxiety/depression, and she does not see a counselor. Her coping strategies include cross word puzzles and reading. After meeting with the doctor, radiation was not recommended at this time. DWIGHT encouraged her to call if assistance is needed in the future.       "

## 2023-08-25 ENCOUNTER — TELEPHONE (OUTPATIENT)
Dept: CARDIOLOGY CLINIC | Age: 73
End: 2023-08-25

## 2023-08-25 NOTE — TELEPHONE ENCOUNTER
Pt daughter called stating since starting cardizem 120 mg she has been tired and sleeps all the time. BP is normal per PCP. Pt was sick x 2 days N&V so they took her to PCP and got tested for everything and it all came back normal. They wanted pt to call us about the med. Mago Nicole it might be too strong?

## 2023-08-25 NOTE — TELEPHONE ENCOUNTER
Patient just started this medication. However can take at nighttime and see if that helps.   If still having problems after a week of doing that further discuss with [facility]

## 2023-08-27 ENCOUNTER — HOSPITAL ENCOUNTER (INPATIENT)
Age: 73
LOS: 5 days | Discharge: HOME | End: 2023-09-01
Attending: STUDENT IN AN ORGANIZED HEALTH CARE EDUCATION/TRAINING PROGRAM | Admitting: STUDENT IN AN ORGANIZED HEALTH CARE EDUCATION/TRAINING PROGRAM
Payer: MEDICARE

## 2023-08-27 ENCOUNTER — APPOINTMENT (OUTPATIENT)
Dept: GENERAL RADIOLOGY | Age: 73
End: 2023-08-27
Payer: MEDICARE

## 2023-08-27 DIAGNOSIS — J44.9 CHRONIC OBSTRUCTIVE PULMONARY DISEASE WITH HYPOXIA (HCC): ICD-10-CM

## 2023-08-27 DIAGNOSIS — R09.02 CHRONIC OBSTRUCTIVE PULMONARY DISEASE WITH HYPOXIA (HCC): ICD-10-CM

## 2023-08-27 DIAGNOSIS — E87.1 HYPONATREMIA: ICD-10-CM

## 2023-08-27 DIAGNOSIS — N17.9 AKI (ACUTE KIDNEY INJURY) (HCC): ICD-10-CM

## 2023-08-27 DIAGNOSIS — J18.9 ATYPICAL PNEUMONIA: ICD-10-CM

## 2023-08-27 DIAGNOSIS — R53.1 GENERALIZED WEAKNESS: Primary | ICD-10-CM

## 2023-08-27 PROBLEM — A41.9 SEPSIS (HCC): Status: ACTIVE | Noted: 2023-08-27

## 2023-08-27 PROBLEM — I10 HYPERTENSION: Status: ACTIVE | Noted: 2023-08-27

## 2023-08-27 PROBLEM — J96.00 ACUTE RESPIRATORY FAILURE (HCC): Status: ACTIVE | Noted: 2023-08-27

## 2023-08-27 LAB
ALBUMIN SERPL-MCNC: 2.8 G/DL (ref 3.5–5.2)
ALP SERPL-CCNC: 81 U/L (ref 35–104)
ALT SERPL-CCNC: 15 U/L (ref 5–33)
ANION GAP SERPL CALCULATED.3IONS-SCNC: 16 MMOL/L (ref 7–19)
AST SERPL-CCNC: 36 U/L (ref 5–32)
B PARAP IS1001 DNA NPH QL NAA+NON-PROBE: NOT DETECTED
B PERT.PT PRMT NPH QL NAA+NON-PROBE: NOT DETECTED
BACTERIA #/AREA URNS HPF: ABNORMAL /HPF
BASE EXCESS ARTERIAL: -1.1 MMOL/L (ref -2–2)
BASOPHILS # BLD: 0 K/UL (ref 0–0.2)
BASOPHILS NFR BLD: 0 % (ref 0–1)
BILIRUB SERPL-MCNC: 1.7 MG/DL (ref 0.2–1.2)
BILIRUB UR QL STRIP: NEGATIVE
BNP BLD-MCNC: 2021 PG/ML (ref 0–124)
BUN SERPL-MCNC: 54 MG/DL (ref 8–23)
C PNEUM DNA NPH QL NAA+NON-PROBE: NOT DETECTED
CALCIUM SERPL-MCNC: 8.4 MG/DL (ref 8.8–10.2)
CARBOXYHEMOGLOBIN ARTERIAL: 2.7 % (ref 0–5)
CHLORIDE SERPL-SCNC: 90 MMOL/L (ref 98–111)
CLARITY UR: ABNORMAL
CO2 SERPL-SCNC: 22 MMOL/L (ref 22–29)
COLOR UR: ABNORMAL
CREAT SERPL-MCNC: 1.8 MG/DL (ref 0.5–0.9)
CREAT UR-MCNC: 132.8 MG/DL (ref 28–217)
EOSINOPHIL # BLD: 0 K/UL (ref 0–0.6)
EOSINOPHIL NFR BLD: 0 % (ref 0–5)
ERYTHROCYTE [DISTWIDTH] IN BLOOD BY AUTOMATED COUNT: 14.8 % (ref 11.5–14.5)
FLUAV RNA NPH QL NAA+NON-PROBE: NOT DETECTED
FLUBV RNA NPH QL NAA+NON-PROBE: NOT DETECTED
GLUCOSE SERPL-MCNC: 113 MG/DL (ref 74–109)
GLUCOSE UR STRIP.AUTO-MCNC: NEGATIVE MG/DL
HADV DNA NPH QL NAA+NON-PROBE: NOT DETECTED
HCO3 ARTERIAL: 21.1 MMOL/L (ref 22–26)
HCOV 229E RNA NPH QL NAA+NON-PROBE: NOT DETECTED
HCOV HKU1 RNA NPH QL NAA+NON-PROBE: NOT DETECTED
HCOV NL63 RNA NPH QL NAA+NON-PROBE: NOT DETECTED
HCOV OC43 RNA NPH QL NAA+NON-PROBE: NOT DETECTED
HCT VFR BLD AUTO: 32.7 % (ref 37–47)
HEMOGLOBIN, ART, EXTENDED: 11.1 G/DL (ref 12–16)
HGB BLD-MCNC: 11.3 G/DL (ref 12–16)
HGB UR STRIP.AUTO-MCNC: ABNORMAL MG/L
HMPV RNA NPH QL NAA+NON-PROBE: NOT DETECTED
HPIV1 RNA NPH QL NAA+NON-PROBE: NOT DETECTED
HPIV2 RNA NPH QL NAA+NON-PROBE: NOT DETECTED
HPIV3 RNA NPH QL NAA+NON-PROBE: NOT DETECTED
HPIV4 RNA NPH QL NAA+NON-PROBE: NOT DETECTED
HYALINE CASTS #/AREA URNS LPF: ABNORMAL /LPF (ref 0–5)
IMM GRANULOCYTES # BLD: 0.1 K/UL
KETONES UR STRIP.AUTO-MCNC: ABNORMAL MG/DL
LACTATE BLDV-SCNC: 1.5 MMOL/L (ref 0.5–1.9)
LEGIONELLA AG UR QL: NORMAL
LEUKOCYTE ESTERASE UR QL STRIP.AUTO: ABNORMAL
LYMPHOCYTES # BLD: 0.8 K/UL (ref 1.1–4.5)
LYMPHOCYTES NFR BLD: 5 % (ref 20–40)
M PNEUMO DNA NPH QL NAA+NON-PROBE: NOT DETECTED
MCH RBC QN AUTO: 33.3 PG (ref 27–31)
MCHC RBC AUTO-ENTMCNC: 34.6 G/DL (ref 33–37)
MCV RBC AUTO: 96.5 FL (ref 81–99)
METHEMOGLOBIN ARTERIAL: 1.3 %
MONOCYTES # BLD: 0.5 K/UL (ref 0–0.9)
MONOCYTES NFR BLD: 3 % (ref 0–10)
NEUTROPHILS # BLD: 13.9 K/UL (ref 1.5–7.5)
NEUTS BAND NFR BLD MANUAL: 12 % (ref 0–5)
NEUTS SEG NFR BLD: 80 % (ref 50–65)
NEUTS VAC BLD QL SMEAR: ABNORMAL
NITRITE UR QL STRIP.AUTO: NEGATIVE
O2 CONTENT ARTERIAL: 12.6 ML/DL
O2 DELIVERY DEVICE: ABNORMAL
O2 SAT, ARTERIAL: 80.9 %
O2 THERAPY: ABNORMAL
PCO2 ARTERIAL: 27 MMHG (ref 35–45)
PH ARTERIAL: 7.5 (ref 7.35–7.45)
PH UR STRIP.AUTO: 5.5 [PH] (ref 5–8)
PLATELET # BLD AUTO: 250 K/UL (ref 130–400)
PLATELET SLIDE REVIEW: ADEQUATE
PMV BLD AUTO: 10.3 FL (ref 9.4–12.3)
PO2 ARTERIAL: 46 MMHG (ref 80–100)
POTASSIUM BLD-SCNC: 2.9 MMOL/L
POTASSIUM SERPL-SCNC: 3.6 MMOL/L (ref 3.5–5)
PROCALCITONIN: 19.08 NG/ML (ref 0–0.09)
PROT SERPL-MCNC: 6.7 G/DL (ref 6.6–8.7)
PROT UR STRIP.AUTO-MCNC: 30 MG/DL
RBC # BLD AUTO: 3.39 M/UL (ref 4.2–5.4)
RBC #/AREA URNS HPF: ABNORMAL /HPF (ref 0–2)
RBC MORPH BLD: NORMAL
RSV RNA NPH QL NAA+NON-PROBE: NOT DETECTED
RV+EV RNA NPH QL NAA+NON-PROBE: NOT DETECTED
S PNEUM AG SPEC QL: NORMAL
SAMPLE SOURCE: ABNORMAL
SARS-COV-2 RNA NPH QL NAA+NON-PROBE: NOT DETECTED
SODIUM SERPL-SCNC: 128 MMOL/L (ref 136–145)
SODIUM UR-SCNC: <20 MMOL/L
SP GR UR STRIP.AUTO: 1.02 (ref 1–1.03)
SQUAMOUS #/AREA URNS HPF: ABNORMAL /HPF
TOXIC GRANULATION: ABNORMAL
TROPONIN T SERPL-MCNC: <0.01 NG/ML (ref 0–0.03)
UROBILINOGEN UR STRIP.AUTO-MCNC: 1 E.U./DL
UUN UR-MCNC: 774 MG/DL
WBC # BLD AUTO: 15.1 K/UL (ref 4.8–10.8)
WBC #/AREA URNS HPF: ABNORMAL /HPF (ref 0–5)

## 2023-08-27 PROCEDURE — 94150 VITAL CAPACITY TEST: CPT

## 2023-08-27 PROCEDURE — 6370000000 HC RX 637 (ALT 250 FOR IP): Performed by: NURSE PRACTITIONER

## 2023-08-27 PROCEDURE — 81001 URINALYSIS AUTO W/SCOPE: CPT

## 2023-08-27 PROCEDURE — 93005 ELECTROCARDIOGRAM TRACING: CPT | Performed by: NURSE PRACTITIONER

## 2023-08-27 PROCEDURE — 87449 NOS EACH ORGANISM AG IA: CPT

## 2023-08-27 PROCEDURE — 87641 MR-STAPH DNA AMP PROBE: CPT

## 2023-08-27 PROCEDURE — 96365 THER/PROPH/DIAG IV INF INIT: CPT

## 2023-08-27 PROCEDURE — 2580000003 HC RX 258: Performed by: NURSE PRACTITIONER

## 2023-08-27 PROCEDURE — 94640 AIRWAY INHALATION TREATMENT: CPT

## 2023-08-27 PROCEDURE — 36415 COLL VENOUS BLD VENIPUNCTURE: CPT

## 2023-08-27 PROCEDURE — 36600 WITHDRAWAL OF ARTERIAL BLOOD: CPT

## 2023-08-27 PROCEDURE — 6360000002 HC RX W HCPCS: Performed by: NURSE PRACTITIONER

## 2023-08-27 PROCEDURE — 0202U NFCT DS 22 TRGT SARS-COV-2: CPT

## 2023-08-27 PROCEDURE — 84145 PROCALCITONIN (PCT): CPT

## 2023-08-27 PROCEDURE — 83880 ASSAY OF NATRIURETIC PEPTIDE: CPT

## 2023-08-27 PROCEDURE — 96361 HYDRATE IV INFUSION ADD-ON: CPT

## 2023-08-27 PROCEDURE — 1210000000 HC MED SURG R&B

## 2023-08-27 PROCEDURE — 82570 ASSAY OF URINE CREATININE: CPT

## 2023-08-27 PROCEDURE — 2700000000 HC OXYGEN THERAPY PER DAY

## 2023-08-27 PROCEDURE — 96375 TX/PRO/DX INJ NEW DRUG ADDON: CPT

## 2023-08-27 PROCEDURE — 85025 COMPLETE CBC W/AUTO DIFF WBC: CPT

## 2023-08-27 PROCEDURE — 82803 BLOOD GASES ANY COMBINATION: CPT

## 2023-08-27 PROCEDURE — 87040 BLOOD CULTURE FOR BACTERIA: CPT

## 2023-08-27 PROCEDURE — 84300 ASSAY OF URINE SODIUM: CPT

## 2023-08-27 PROCEDURE — 80053 COMPREHEN METABOLIC PANEL: CPT

## 2023-08-27 PROCEDURE — 83605 ASSAY OF LACTIC ACID: CPT

## 2023-08-27 PROCEDURE — 99285 EMERGENCY DEPT VISIT HI MDM: CPT

## 2023-08-27 PROCEDURE — 71045 X-RAY EXAM CHEST 1 VIEW: CPT

## 2023-08-27 PROCEDURE — 84540 ASSAY OF URINE/UREA-N: CPT

## 2023-08-27 PROCEDURE — 84484 ASSAY OF TROPONIN QUANT: CPT

## 2023-08-27 RX ORDER — ROPINIROLE 0.25 MG/1
1 TABLET, FILM COATED ORAL 2 TIMES DAILY PRN
Status: DISCONTINUED | OUTPATIENT
Start: 2023-08-27 | End: 2023-09-01 | Stop reason: HOSPADM

## 2023-08-27 RX ORDER — ONDANSETRON 4 MG/1
4 TABLET, ORALLY DISINTEGRATING ORAL EVERY 8 HOURS PRN
Status: DISCONTINUED | OUTPATIENT
Start: 2023-08-27 | End: 2023-09-01 | Stop reason: HOSPADM

## 2023-08-27 RX ORDER — POLYETHYLENE GLYCOL 3350 17 G/17G
17 POWDER, FOR SOLUTION ORAL DAILY PRN
Status: DISCONTINUED | OUTPATIENT
Start: 2023-08-27 | End: 2023-09-01 | Stop reason: HOSPADM

## 2023-08-27 RX ORDER — MONTELUKAST SODIUM 10 MG/1
10 TABLET ORAL NIGHTLY
Status: DISCONTINUED | OUTPATIENT
Start: 2023-08-27 | End: 2023-09-01 | Stop reason: HOSPADM

## 2023-08-27 RX ORDER — IPRATROPIUM BROMIDE AND ALBUTEROL SULFATE 2.5; .5 MG/3ML; MG/3ML
1 SOLUTION RESPIRATORY (INHALATION)
Status: DISCONTINUED | OUTPATIENT
Start: 2023-08-27 | End: 2023-08-28

## 2023-08-27 RX ORDER — ACETAMINOPHEN 650 MG/1
650 SUPPOSITORY RECTAL EVERY 6 HOURS PRN
Status: DISCONTINUED | OUTPATIENT
Start: 2023-08-27 | End: 2023-09-01 | Stop reason: HOSPADM

## 2023-08-27 RX ORDER — SODIUM CHLORIDE 0.9 % (FLUSH) 0.9 %
5-40 SYRINGE (ML) INJECTION EVERY 12 HOURS SCHEDULED
Status: DISCONTINUED | OUTPATIENT
Start: 2023-08-27 | End: 2023-09-01 | Stop reason: HOSPADM

## 2023-08-27 RX ORDER — GUAIFENESIN 600 MG/1
600 TABLET, EXTENDED RELEASE ORAL 2 TIMES DAILY
Status: DISCONTINUED | OUTPATIENT
Start: 2023-08-27 | End: 2023-08-31

## 2023-08-27 RX ORDER — SODIUM CHLORIDE 9 MG/ML
INJECTION, SOLUTION INTRAVENOUS CONTINUOUS
Status: DISCONTINUED | OUTPATIENT
Start: 2023-08-27 | End: 2023-08-29

## 2023-08-27 RX ORDER — 0.9 % SODIUM CHLORIDE 0.9 %
1000 INTRAVENOUS SOLUTION INTRAVENOUS ONCE
Status: COMPLETED | OUTPATIENT
Start: 2023-08-27 | End: 2023-08-27

## 2023-08-27 RX ORDER — IPRATROPIUM BROMIDE AND ALBUTEROL SULFATE 2.5; .5 MG/3ML; MG/3ML
1 SOLUTION RESPIRATORY (INHALATION) ONCE
Status: COMPLETED | OUTPATIENT
Start: 2023-08-27 | End: 2023-08-27

## 2023-08-27 RX ORDER — ASPIRIN 81 MG/1
81 TABLET ORAL DAILY
Status: DISCONTINUED | OUTPATIENT
Start: 2023-08-27 | End: 2023-09-01 | Stop reason: HOSPADM

## 2023-08-27 RX ORDER — ENOXAPARIN SODIUM 100 MG/ML
30 INJECTION SUBCUTANEOUS DAILY
Status: DISCONTINUED | OUTPATIENT
Start: 2023-08-27 | End: 2023-08-29

## 2023-08-27 RX ORDER — SODIUM CHLORIDE 0.9 % (FLUSH) 0.9 %
5-40 SYRINGE (ML) INJECTION PRN
Status: DISCONTINUED | OUTPATIENT
Start: 2023-08-27 | End: 2023-09-01 | Stop reason: HOSPADM

## 2023-08-27 RX ORDER — ACETAMINOPHEN 325 MG/1
650 TABLET ORAL EVERY 6 HOURS PRN
Status: DISCONTINUED | OUTPATIENT
Start: 2023-08-27 | End: 2023-09-01 | Stop reason: HOSPADM

## 2023-08-27 RX ORDER — ONDANSETRON 2 MG/ML
4 INJECTION INTRAMUSCULAR; INTRAVENOUS EVERY 6 HOURS PRN
Status: DISCONTINUED | OUTPATIENT
Start: 2023-08-27 | End: 2023-09-01 | Stop reason: HOSPADM

## 2023-08-27 RX ORDER — SODIUM CHLORIDE 9 MG/ML
INJECTION, SOLUTION INTRAVENOUS PRN
Status: DISCONTINUED | OUTPATIENT
Start: 2023-08-27 | End: 2023-09-01 | Stop reason: HOSPADM

## 2023-08-27 RX ADMIN — ASPIRIN 81 MG: 81 TABLET, COATED ORAL at 17:35

## 2023-08-27 RX ADMIN — SODIUM CHLORIDE 1000 ML: 9 INJECTION, SOLUTION INTRAVENOUS at 09:55

## 2023-08-27 RX ADMIN — SODIUM CHLORIDE 1000 ML: 9 INJECTION, SOLUTION INTRAVENOUS at 14:39

## 2023-08-27 RX ADMIN — WATER 1000 MG: 1 INJECTION INTRAMUSCULAR; INTRAVENOUS; SUBCUTANEOUS at 10:26

## 2023-08-27 RX ADMIN — SODIUM CHLORIDE, PRESERVATIVE FREE 10 ML: 5 INJECTION INTRAVENOUS at 21:23

## 2023-08-27 RX ADMIN — ENOXAPARIN SODIUM 30 MG: 100 INJECTION SUBCUTANEOUS at 14:09

## 2023-08-27 RX ADMIN — GUAIFENESIN 600 MG: 600 TABLET, EXTENDED RELEASE ORAL at 14:09

## 2023-08-27 RX ADMIN — IPRATROPIUM BROMIDE AND ALBUTEROL SULFATE 1 DOSE: 2.5; .5 SOLUTION RESPIRATORY (INHALATION) at 15:17

## 2023-08-27 RX ADMIN — IPRATROPIUM BROMIDE AND ALBUTEROL SULFATE 1 DOSE: 2.5; .5 SOLUTION RESPIRATORY (INHALATION) at 19:09

## 2023-08-27 RX ADMIN — SODIUM CHLORIDE: 9 INJECTION, SOLUTION INTRAVENOUS at 15:55

## 2023-08-27 RX ADMIN — ONDANSETRON 4 MG: 4 TABLET, ORALLY DISINTEGRATING ORAL at 17:35

## 2023-08-27 RX ADMIN — SODIUM CHLORIDE: 9 INJECTION, SOLUTION INTRAVENOUS at 14:06

## 2023-08-27 RX ADMIN — GUAIFENESIN 600 MG: 600 TABLET, EXTENDED RELEASE ORAL at 21:22

## 2023-08-27 RX ADMIN — IPRATROPIUM BROMIDE AND ALBUTEROL SULFATE 1 DOSE: 2.5; .5 SOLUTION RESPIRATORY (INHALATION) at 10:56

## 2023-08-27 RX ADMIN — IPRATROPIUM BROMIDE AND ALBUTEROL SULFATE 1 DOSE: 2.5; .5 SOLUTION RESPIRATORY (INHALATION) at 22:42

## 2023-08-27 RX ADMIN — MONTELUKAST 10 MG: 10 TABLET, FILM COATED ORAL at 21:22

## 2023-08-27 RX ADMIN — AZITHROMYCIN MONOHYDRATE 500 MG: 500 INJECTION, POWDER, LYOPHILIZED, FOR SOLUTION INTRAVENOUS at 10:27

## 2023-08-27 ASSESSMENT — ENCOUNTER SYMPTOMS
BLOOD IN STOOL: 0
SINUS PAIN: 0
ABDOMINAL PAIN: 0
TROUBLE SWALLOWING: 0
WHEEZING: 1
COUGH: 1
VOMITING: 0
SORE THROAT: 0
CONSTIPATION: 0
DIARRHEA: 0
SHORTNESS OF BREATH: 1
NAUSEA: 1
ABDOMINAL DISTENTION: 0

## 2023-08-27 ASSESSMENT — PAIN SCALES - GENERAL
PAINLEVEL_OUTOF10: 5
PAINLEVEL_OUTOF10: 0

## 2023-08-27 ASSESSMENT — PAIN DESCRIPTION - LOCATION: LOCATION: BACK

## 2023-08-27 NOTE — PROGRESS NOTES
4 Eyes Skin Assessment     NAME:  Adelaide Colvin  YOB: 1950  MEDICAL RECORD NUMBER:  [id number]    The patient is being assessed for  Admission    I agree that at least one RN has performed a thorough Head to Toe Skin Assessment on the patient. ALL assessment sites listed below have been assessed. Areas assessed by both nurses:    Head, Face, Ears, Arms, Elbows, Hands, and Legs. Feet and Heels        Does the Patient have a Wound?  No noted wound(s)       Alfredo Prevention initiated by RN: Yes  Wound Care Orders initiated by RN: No    Pressure Injury (Stage 3,4, Unstageable, DTI, NWPT, and Complex wounds) if present, place Wound referral order by RN under : No    New Ostomies, if present place, Ostomy referral order under : No     Nurse 1 eSignature: Electronically signed by Adan England RN on 8/27/23 at 1:24 PM CDT    **SHARE this note so that the co-signing nurse can place an eSignature**    Nurse 2 eSignature: Electronically signed by Ciaran Kapoor RN on 8/27/23 at 2:58 PM CDT EYE

## 2023-08-28 LAB
ALBUMIN SERPL-MCNC: 2.2 G/DL (ref 3.5–5.2)
ALP SERPL-CCNC: 67 U/L (ref 35–104)
ALT SERPL-CCNC: 19 U/L (ref 5–33)
ANION GAP SERPL CALCULATED.3IONS-SCNC: 16 MMOL/L (ref 7–19)
AST SERPL-CCNC: 36 U/L (ref 5–32)
BASOPHILS # BLD: 0 K/UL (ref 0–0.2)
BASOPHILS NFR BLD: 0 % (ref 0–1)
BILIRUB SERPL-MCNC: 2.3 MG/DL (ref 0.2–1.2)
BUN SERPL-MCNC: 38 MG/DL (ref 8–23)
CALCIUM SERPL-MCNC: 7.5 MG/DL (ref 8.8–10.2)
CHLORIDE SERPL-SCNC: 97 MMOL/L (ref 98–111)
CO2 SERPL-SCNC: 17 MMOL/L (ref 22–29)
CREAT SERPL-MCNC: 1.2 MG/DL (ref 0.5–0.9)
EKG P AXIS: 68 DEGREES
EKG P-R INTERVAL: 146 MS
EKG Q-T INTERVAL: 368 MS
EKG QRS DURATION: 92 MS
EKG QTC CALCULATION (BAZETT): 422 MS
EKG T AXIS: 47 DEGREES
EOSINOPHIL # BLD: 0 K/UL (ref 0–0.6)
EOSINOPHIL NFR BLD: 0 % (ref 0–5)
ERYTHROCYTE [DISTWIDTH] IN BLOOD BY AUTOMATED COUNT: 14.7 % (ref 11.5–14.5)
GLUCOSE SERPL-MCNC: 107 MG/DL (ref 74–109)
HCT VFR BLD AUTO: 29.8 % (ref 37–47)
HGB BLD-MCNC: 10.2 G/DL (ref 12–16)
IMM GRANULOCYTES # BLD: 0 K/UL
LYMPHOCYTES # BLD: 0.4 K/UL (ref 1.1–4.5)
LYMPHOCYTES NFR BLD: 5 % (ref 20–40)
MAGNESIUM SERPL-MCNC: 1.6 MG/DL (ref 1.6–2.4)
MCH RBC QN AUTO: 32.6 PG (ref 27–31)
MCHC RBC AUTO-ENTMCNC: 34.2 G/DL (ref 33–37)
MCV RBC AUTO: 95.2 FL (ref 81–99)
MONOCYTES # BLD: 0.8 K/UL (ref 0–0.9)
MONOCYTES NFR BLD: 10 % (ref 0–10)
MRSA DNA SPEC QL NAA+PROBE: NOT DETECTED
NEUTROPHILS # BLD: 7 K/UL (ref 1.5–7.5)
NEUTS BAND NFR BLD MANUAL: 2 % (ref 0–5)
NEUTS SEG NFR BLD: 83 % (ref 50–65)
NEUTS VAC BLD QL SMEAR: ABNORMAL
PLATELET # BLD AUTO: 232 K/UL (ref 130–400)
PLATELET SLIDE REVIEW: ADEQUATE
PMV BLD AUTO: 9.8 FL (ref 9.4–12.3)
POTASSIUM SERPL-SCNC: 3 MMOL/L (ref 3.5–5)
PROT SERPL-MCNC: 5.5 G/DL (ref 6.6–8.7)
RBC # BLD AUTO: 3.13 M/UL (ref 4.2–5.4)
SODIUM SERPL-SCNC: 130 MMOL/L (ref 136–145)
TOXIC GRANULATION: ABNORMAL
WBC # BLD AUTO: 8.2 K/UL (ref 4.8–10.8)

## 2023-08-28 PROCEDURE — 85025 COMPLETE CBC W/AUTO DIFF WBC: CPT

## 2023-08-28 PROCEDURE — 6360000002 HC RX W HCPCS: Performed by: STUDENT IN AN ORGANIZED HEALTH CARE EDUCATION/TRAINING PROGRAM

## 2023-08-28 PROCEDURE — 94640 AIRWAY INHALATION TREATMENT: CPT

## 2023-08-28 PROCEDURE — 2580000003 HC RX 258: Performed by: NURSE PRACTITIONER

## 2023-08-28 PROCEDURE — 93010 ELECTROCARDIOGRAM REPORT: CPT | Performed by: INTERNAL MEDICINE

## 2023-08-28 PROCEDURE — 94761 N-INVAS EAR/PLS OXIMETRY MLT: CPT

## 2023-08-28 PROCEDURE — 97530 THERAPEUTIC ACTIVITIES: CPT

## 2023-08-28 PROCEDURE — 97535 SELF CARE MNGMENT TRAINING: CPT

## 2023-08-28 PROCEDURE — 6360000002 HC RX W HCPCS: Performed by: NURSE PRACTITIONER

## 2023-08-28 PROCEDURE — 6360000002 HC RX W HCPCS: Performed by: HOSPITALIST

## 2023-08-28 PROCEDURE — 36415 COLL VENOUS BLD VENIPUNCTURE: CPT

## 2023-08-28 PROCEDURE — 83735 ASSAY OF MAGNESIUM: CPT

## 2023-08-28 PROCEDURE — 1210000000 HC MED SURG R&B

## 2023-08-28 PROCEDURE — 94760 N-INVAS EAR/PLS OXIMETRY 1: CPT

## 2023-08-28 PROCEDURE — 97165 OT EVAL LOW COMPLEX 30 MIN: CPT

## 2023-08-28 PROCEDURE — 6370000000 HC RX 637 (ALT 250 FOR IP): Performed by: NURSE PRACTITIONER

## 2023-08-28 PROCEDURE — 94669 MECHANICAL CHEST WALL OSCILL: CPT

## 2023-08-28 PROCEDURE — 80053 COMPREHEN METABOLIC PANEL: CPT

## 2023-08-28 PROCEDURE — 97162 PT EVAL MOD COMPLEX 30 MIN: CPT

## 2023-08-28 PROCEDURE — 6370000000 HC RX 637 (ALT 250 FOR IP): Performed by: HOSPITALIST

## 2023-08-28 PROCEDURE — 94618 PULMONARY STRESS TESTING: CPT

## 2023-08-28 RX ORDER — ACETYLCYSTEINE 200 MG/ML
600 SOLUTION ORAL; RESPIRATORY (INHALATION) 2 TIMES DAILY
Status: DISCONTINUED | OUTPATIENT
Start: 2023-08-28 | End: 2023-08-29

## 2023-08-28 RX ORDER — IPRATROPIUM BROMIDE AND ALBUTEROL SULFATE 2.5; .5 MG/3ML; MG/3ML
1 SOLUTION RESPIRATORY (INHALATION) EVERY 4 HOURS
Status: DISCONTINUED | OUTPATIENT
Start: 2023-08-28 | End: 2023-08-30

## 2023-08-28 RX ORDER — ACETYLCYSTEINE 200 MG/ML
600 SOLUTION ORAL; RESPIRATORY (INHALATION) EVERY 4 HOURS
Status: DISCONTINUED | OUTPATIENT
Start: 2023-08-28 | End: 2023-08-28

## 2023-08-28 RX ORDER — POTASSIUM CHLORIDE 7.45 MG/ML
10 INJECTION INTRAVENOUS PRN
Status: DISCONTINUED | OUTPATIENT
Start: 2023-08-28 | End: 2023-09-01 | Stop reason: HOSPADM

## 2023-08-28 RX ORDER — POTASSIUM CHLORIDE 20 MEQ/1
40 TABLET, EXTENDED RELEASE ORAL PRN
Status: DISCONTINUED | OUTPATIENT
Start: 2023-08-28 | End: 2023-09-01 | Stop reason: HOSPADM

## 2023-08-28 RX ADMIN — MONTELUKAST 10 MG: 10 TABLET, FILM COATED ORAL at 19:46

## 2023-08-28 RX ADMIN — IPRATROPIUM BROMIDE AND ALBUTEROL SULFATE 1 DOSE: 2.5; .5 SOLUTION RESPIRATORY (INHALATION) at 10:32

## 2023-08-28 RX ADMIN — ASPIRIN 81 MG: 81 TABLET, COATED ORAL at 08:55

## 2023-08-28 RX ADMIN — POTASSIUM CHLORIDE 10 MEQ: 7.46 INJECTION, SOLUTION INTRAVENOUS at 18:29

## 2023-08-28 RX ADMIN — POTASSIUM CHLORIDE 10 MEQ: 7.46 INJECTION, SOLUTION INTRAVENOUS at 15:45

## 2023-08-28 RX ADMIN — POTASSIUM CHLORIDE 10 MEQ: 7.46 INJECTION, SOLUTION INTRAVENOUS at 13:06

## 2023-08-28 RX ADMIN — ENOXAPARIN SODIUM 30 MG: 100 INJECTION SUBCUTANEOUS at 08:54

## 2023-08-28 RX ADMIN — SODIUM CHLORIDE, PRESERVATIVE FREE 10 ML: 5 INJECTION INTRAVENOUS at 19:46

## 2023-08-28 RX ADMIN — IPRATROPIUM BROMIDE AND ALBUTEROL SULFATE 1 DOSE: 2.5; .5 SOLUTION RESPIRATORY (INHALATION) at 02:30

## 2023-08-28 RX ADMIN — ACETYLCYSTEINE 600 MG: 200 SOLUTION ORAL; RESPIRATORY (INHALATION) at 10:32

## 2023-08-28 RX ADMIN — IPRATROPIUM BROMIDE AND ALBUTEROL SULFATE 1 DOSE: 2.5; .5 SOLUTION RESPIRATORY (INHALATION) at 22:06

## 2023-08-28 RX ADMIN — POTASSIUM CHLORIDE 10 MEQ: 7.46 INJECTION, SOLUTION INTRAVENOUS at 17:06

## 2023-08-28 RX ADMIN — POTASSIUM CHLORIDE 10 MEQ: 7.46 INJECTION, SOLUTION INTRAVENOUS at 11:57

## 2023-08-28 RX ADMIN — SODIUM CHLORIDE: 9 INJECTION, SOLUTION INTRAVENOUS at 19:50

## 2023-08-28 RX ADMIN — GUAIFENESIN 600 MG: 600 TABLET, EXTENDED RELEASE ORAL at 08:55

## 2023-08-28 RX ADMIN — IPRATROPIUM BROMIDE AND ALBUTEROL SULFATE 1 DOSE: 2.5; .5 SOLUTION RESPIRATORY (INHALATION) at 14:52

## 2023-08-28 RX ADMIN — SODIUM CHLORIDE, PRESERVATIVE FREE 10 ML: 5 INJECTION INTRAVENOUS at 08:55

## 2023-08-28 RX ADMIN — IPRATROPIUM BROMIDE AND ALBUTEROL SULFATE 1 DOSE: 2.5; .5 SOLUTION RESPIRATORY (INHALATION) at 06:41

## 2023-08-28 RX ADMIN — CEFTRIAXONE 1000 MG: 1 INJECTION, POWDER, FOR SOLUTION INTRAMUSCULAR; INTRAVENOUS at 10:41

## 2023-08-28 RX ADMIN — POTASSIUM CHLORIDE 10 MEQ: 7.46 INJECTION, SOLUTION INTRAVENOUS at 09:22

## 2023-08-28 RX ADMIN — AZITHROMYCIN MONOHYDRATE 500 MG: 500 INJECTION, POWDER, LYOPHILIZED, FOR SOLUTION INTRAVENOUS at 10:41

## 2023-08-28 RX ADMIN — ACETYLCYSTEINE 600 MG: 200 SOLUTION ORAL; RESPIRATORY (INHALATION) at 18:12

## 2023-08-28 RX ADMIN — GUAIFENESIN 600 MG: 600 TABLET, EXTENDED RELEASE ORAL at 19:46

## 2023-08-28 RX ADMIN — IPRATROPIUM BROMIDE AND ALBUTEROL SULFATE 1 DOSE: 2.5; .5 SOLUTION RESPIRATORY (INHALATION) at 18:12

## 2023-08-28 RX ADMIN — POTASSIUM CHLORIDE 40 MEQ: 1500 TABLET, EXTENDED RELEASE ORAL at 09:22

## 2023-08-28 RX ADMIN — SODIUM CHLORIDE: 9 INJECTION, SOLUTION INTRAVENOUS at 02:20

## 2023-08-28 RX ADMIN — METHYLPREDNISOLONE SODIUM SUCCINATE 40 MG: 40 INJECTION, POWDER, FOR SOLUTION INTRAMUSCULAR; INTRAVENOUS at 08:55

## 2023-08-28 ASSESSMENT — ENCOUNTER SYMPTOMS
EYES NEGATIVE: 1
COUGH: 0
SHORTNESS OF BREATH: 1
WHEEZING: 1
ALLERGIC/IMMUNOLOGIC NEGATIVE: 1
BACK PAIN: 1
GASTROINTESTINAL NEGATIVE: 1

## 2023-08-28 NOTE — PROGRESS NOTES
10:25a - Home oxygen evaluation performed and completed and results are (at this time)as follows: SaO2 = 86% on R/A at rest, SaO2 = 88% on 2 lpm(NC) at rest, SaO2 = 93% on 3 lpm O2(NC) at rest, SaO2 = 85% on R/A while ambulating(minimal), SaO2 = 88% on 2 lpm(O2) while ambulating(minimal), SaO2 = 91% on 3 lpm(NC) while ambulating(minimal). (Recovery SaO2).  TS RRT

## 2023-08-28 NOTE — CONSULTS
Comprehensive Nutrition Assessment    Type and Reason for Visit:  Initial, Positive Nutrition Screen, Consult    Nutrition Recommendations/Plan:   Ensure ONS TID     Malnutrition Assessment:  Malnutrition Status: At risk for malnutrition (Comment) (08/28/23 1008)    Context:  Acute Illness     Findings of the 6 clinical characteristics of malnutrition:  Energy Intake:  50% or less of estimated energy requirements for 5 or more days  Weight Loss:  No significant weight loss     Body Fat Loss:  No significant body fat loss     Muscle Mass Loss:  No significant muscle mass loss    Fluid Accumulation:  No significant fluid accumulation     Strength:  Not Performed    Nutrition Assessment:    Consult for poor po intake recieved. Pt at risk for malnutrition AEB poor po intake and no appetite X 5 days. Wt hx stable per chart review. Pt reports she can put food in her mouth, but then feels sick. Pt agreeable to try ONS and soup at lunch. Will order ONS TID and update lunch ticket. Nutrition Related Findings:    Trace BLE edema Wound Type: None       Current Nutrition Intake & Therapies:    Average Meal Intake: 1-25%  Average Supplements Intake: None Ordered  ADULT ORAL NUTRITION SUPPLEMENT; Breakfast, Lunch, Dinner; Standard High Calorie/High Protein Oral Supplement  ADULT DIET; Regular  *NO strawberry flavored items per request    Anthropometric Measures:  Height: 5' 1\" (154.9 cm)  Ideal Body Weight (IBW): 105 lbs (48 kg)    Admission Body Weight: 157 lb (71.2 kg)  Current Body Weight: 157 lb (71.2 kg),   IBW. Weight Source: Bed Scale  Current BMI (kg/m2): 29.7  Usual Body Weight: 151 lb (68.5 kg) (7/25/23)  % Weight Change (Calculated): 4                    BMI Categories: Overweight (BMI 25.0-29. 9)    Estimated Daily Nutrient Needs:  Energy Requirements Based On: Kcal/kg  Weight Used for Energy Requirements: Current (20-25 kcals/kg)  Energy (kcal/day): 4799-8261 kcals/day  Weight Used for Protein Requirements: Ideal (1.2-2 g/kg)  Protein (g/day): 58-96 g/pro/day  Method Used for Fluid Requirements: 1 ml/kcal  Fluid (ml/day): 9698-0343 mL/fluid/day    Nutrition Diagnosis:   Inadequate oral intake related to altered GI function as evidenced by poor intake prior to admission, nausea    Nutrition Interventions:   Food and/or Nutrient Delivery: Continue Current Diet, Start Oral Nutrition Supplement  Nutrition Education/Counseling: No recommendation at this time  Coordination of Nutrition Care: Continue to monitor while inpatient       Goals:     Goals: PO intake 50% or greater       Nutrition Monitoring and Evaluation:   Behavioral-Environmental Outcomes: None Identified  Food/Nutrient Intake Outcomes: Food and Nutrient Intake, Supplement Intake  Physical Signs/Symptoms Outcomes: Biochemical Data, Nutrition Focused Physical Findings, Weight, GI Status, Nausea or Vomiting, Fluid Status or Edema    Discharge Planning:     Too soon to determine     Kush Bustillo MS, RD, LD  Contact: [phone]

## 2023-08-28 NOTE — ACP (ADVANCE CARE PLANNING)
Advance Care Planning     Advance Care Planning Inpatient Note  [facility] Department    Today's Date: 8/28/2023  Unit: [facility] 4 ONCOLOGY UNIT    Received request from rounding. Upon review of chart and communication with care team, patient's decision making abilities are not in question. . Patient and Spouse was/were present in the room during visit. Goals of ACP Conversation:  Discuss advance care planning documents  Facilitate a discussion related to patient's goals of care as they align with the patient's values and beliefs. Health Care Decision Makers:    Summary: Patient and spouse  55 years. Patient has two adult children, one daughter included in contact whereas one is not as he is unavailable to contact. Spouse has two children son and daughter and daughter listed in contact. At time of visit Esme Main, as next of kin is marked as primary decision maker per [facility]. Documented Next of Kin, per patient report    Advance Care Planning Documents (Patient Wishes):  [place] provided and  provided education regarding benefit of completing document.  remains for future assistance/needs to complete documentation or explore concerns/issues. Assessment:  Patient and spouse have been  a long time as noted 55 years together. \"Both of us were previously  and we each have two children\" All four adult children have been described as getting along and further conversation encouraged. At this time paperwork provided and conversation can be on going with family and available assistance when/if needed.    Interventions:  Provided education on documents for clarity and greater understanding  Discussed and provided education on state decision maker hierarchy  Encouraged ongoing ACP conversation with future decision makers and loved ones    Care Preferences Communicated:   No    Outcomes/Plan:  ACP Discussion:  remains available if patient or spouse

## 2023-08-28 NOTE — PLAN OF CARE
Problem: Skin/Tissue Integrity  Goal: Absence of new skin breakdown  Description: 1. Monitor for areas of redness and/or skin breakdown  2. Every 4-6 hours minimum:  Change oxygen saturation probe site  3. Every 4-6 hours:  If on nasal continuous positive airway pressure, respiratory therapy assess nares and determine need for appliance change or resting period.   8/28/2023 0404 by Denton Juarez LPN  Outcome: Progressing  8/28/2023 0355 by Denton Juarez LPN  Outcome: Progressing     Problem: Safety - Adult  Goal: Free from fall injury  8/28/2023 0404 by Denton Juarez LPN  Outcome: Progressing  Flowsheets (Taken 8/28/2023 0354)  Free From Fall Injury: Instruct family/caregiver on patient safety  8/28/2023 0355 by Denton Juarez LPN  Outcome: Progressing  Flowsheets (Taken 8/28/2023 0354)  Free From Fall Injury: Instruct family/caregiver on patient safety     Problem: ABCDS Injury Assessment  Goal: Absence of physical injury  8/28/2023 0404 by Denton Juarez LPN  Outcome: Progressing  Flowsheets (Taken 8/28/2023 0354)  Absence of Physical Injury: Implement safety measures based on patient assessment  8/28/2023 0355 by Denton Juarez LPN  Outcome: Progressing  Flowsheets (Taken 8/28/2023 0354)  Absence of Physical Injury: Implement safety measures based on patient assessment     Problem: Pain  Goal: Verbalizes/displays adequate comfort level or baseline comfort level  8/28/2023 0404 by Denton Juarez LPN  Outcome: Progressing  Flowsheets (Taken 8/27/2023 2303)  Verbalizes/displays adequate comfort level or baseline comfort level:   Encourage patient to monitor pain and request assistance   Assess pain using appropriate pain scale   Administer analgesics based on type and severity of pain and evaluate response   Implement non-pharmacological measures as appropriate and evaluate response   Consider cultural and social influences on pain and pain management   Notify Licensed Independent

## 2023-08-28 NOTE — CARE COORDINATION
Case Management Assessment  Initial Evaluation    Date/Time of Evaluation: 8/28/2023 10:41 AM  Assessment Completed by: Kate Swann RN    If patient is discharged prior to next notation, then this note serves as note for discharge by case management. Patient Name: Suni Domínguez                   YOB: 1950  Diagnosis: Hyponatremia [E87.1]  Generalized weakness [R53.1]  Atypical pneumonia [J18.9]  PATEL (acute kidney injury) ([address]) [N17.9]  Chronic obstructive pulmonary disease with hypoxia ([address]) [J44.9, R09.02]  Community acquired pneumonia, bilateral [J18.9]                   Date / Time: 8/27/2023  9:19 AM    Patient Admission Status: Inpatient   Readmission Risk (Low < 19, Mod (19-27), High > 27): Readmission Risk Score: 16.6    Current PCP: ALEJANDRA Wynn NP  PCP verified by CM? Yes    Chart Reviewed: Yes      History Provided by: Spouse, Child/Family  Patient Orientation: Alert and Oriented    Patient Cognition: Alert    Hospitalization in the last 30 days (Readmission):  No    If yes, Readmission Assessment in CM Navigator will be completed. Advance Directives:      Code Status: Full Code   Patient's Primary Decision Maker is:        Discharge Planning:    Patient lives with: Spouse/Significant Other Type of Home: Trailer/Mobile Home  Primary Care Giver: Family  Patient Support Systems include: Spouse/Significant Other, Children   Current Financial resources: Medicare  Current community resources:    Current services prior to admission: Durable Medical Equipment            Current DME: Walker            Type of Home Care services:  OT, PT, Nursing Services    ADLS  Prior functional level: Assistance with the following:, Cooking, Housework, Shopping  Current functional level: Assistance with the following:, Cooking, Housework, Shopping    PT AM-PAC:   /24  OT AM-PAC:   /24    Family can provide assistance at DC:  Yes  Would you like Case Management to discuss the discharge plan with any other R09.02]  Community acquired pneumonia, bilateral [D88.2]    IF APPLICABLE: The Patient and/or patient representative Chaparro Pascual and her family were provided with a choice of provider and agrees with the discharge plan. Freedom of choice list with basic dialogue that supports the patient's individualized plan of care/goals and shares the quality data associated with the providers was provided to: Patient   Patient Representative Name:       The Patient and/or Patient Representative Agree with the Discharge Plan?  Yes    Kody Schmidt RN  Case Management Department  Ph: [phone] Fax: [phone]

## 2023-08-28 NOTE — PROGRESS NOTES
Physical Therapy  Facility/Department: [facility] ONCOLOGY UNIT  Physical Therapy Initial Assessment    Name: Mirta Mcdaniel  : 1950  MRN: [id number]  Date of Service: 2023    Discharge Recommendations:  Continue to assess pending progress, 24 hour supervision or assist, Patient would benefit from continued therapy after discharge          Patient Diagnosis(es): The primary encounter diagnosis was Generalized weakness. Diagnoses of PATEL (acute kidney injury) ([address]), Hyponatremia, Chronic obstructive pulmonary disease with hypoxia ([address]), and Atypical pneumonia were also pertinent to this visit. Past Medical History:  has a past medical history of GERD (gastroesophageal reflux disease) and Hypertension. Past Surgical History:  has a past surgical history that includes Gallbladder surgery; Hysterectomy, total abdominal; Tonsillectomy and adenoidectomy; Carpal tunnel release (Right); and Ovary removal.    Assessment   Body Structures, Functions, Activity Limitations Requiring Skilled Therapeutic Intervention: Decreased functional mobility ; Decreased ROM; Decreased ADL status; Decreased body mechanics; Decreased endurance;Decreased strength;Decreased sensation;Decreased balance; Increased pain;Decreased posture  Assessment: Pt. will benefit from cont. PT to decrease impairments. Pt. a fall risk and should not attempt mobility on her own at this time. She is in a deconditioned state and fatigues quickly. Pt. needed v. cues to perform pursed lip breathing to increase O2 sats. Pt. encouraged to sit up in chair today and will progress mobility as able. Stated she felt better sitting up. Pt. needs to use gait belt, RW and have staff A for mobility. Treatment Diagnosis: deconditioning  Therapy Prognosis: Good  Decision Making: Medium Complexity  Barriers to Learning: none noted  Requires PT Follow-Up: Yes  Activity Tolerance  Activity Tolerance: Patient limited by endurance; Patient limited by fatigue  Activity Tolerance

## 2023-08-28 NOTE — PROGRESS NOTES
08/28/23 1048   Encounter Summary   Encounter Overview/Reason  Initial Encounter   Service Provided For: Patient and family together  Trudi Wilcox, spouse at bedside during visit. Stated son stayed last night. Voiced adult children assist with transportation.)   Support System Spouse; Children  (Patient & Spouse  46 years and each has 2 children.)   Complexity of Encounter Moderate   Begin Time 1032   End Time  1147   Total Time Calculated 75 min   Spiritual/Emotional needs   Type Spiritual Distress; Emotional Distress  (Breathing difficulty adds to distress phsically, emotionally and spiritual support welcomed with prayer. Nazia Urban has been notified by spouse but has not visited at this time.)   Grief, Loss, and Adjustments   Type Adjustment to illness  (Patient works to adjust to new breathing treatment provided by therapist and complies sitting up with effot noted and tiredness met in need to lie down but unable to get self adjusted without call to staff for assist - done.)   Advance Care Planning   Type ACP conversation  (Patient and spouse engaged in conversation - no AD at this time but paper work Living Will [address] provided. see note)   Assessment/Intervention/Outcome   Assessment Compromised coping; Hopeful  (Patient awake and alert is accepting of needed respiratory and nursing support. Patient has difficulty coping due to physical distress with not optimal breathing capacity aided by treatment plan.)   Intervention Active listening;Explored Coping Skills/Resources;Sustaining Presence/Ministry of presence  (Patient & Spouse provided encouragement and prayer reflecting on petition for healing - breathing, coping and blessing praise for care - physical as remarks made on improvement since admission and hope for health)   Outcome Acceptance;Expressed Gratitude; Less anxious, Less agitated  (Spouse reports on how much better patient is doing since admission.  Spouse voiced regret in not having brought her to

## 2023-08-28 NOTE — PLAN OF CARE
Problem: Skin/Tissue Integrity  Goal: Absence of new skin breakdown  Description: 1. Monitor for areas of redness and/or skin breakdown  2. Every 4-6 hours minimum:  Change oxygen saturation probe site  3. Every 4-6 hours:  If on nasal continuous positive airway pressure, respiratory therapy assess nares and determine need for appliance change or resting period.   Outcome: Progressing     Problem: Safety - Adult  Goal: Free from fall injury  Outcome: Progressing  Flowsheets (Taken 8/28/2023 0354)  Free From Fall Injury: Instruct family/caregiver on patient safety     Problem: ABCDS Injury Assessment  Goal: Absence of physical injury  Outcome: Progressing  Flowsheets (Taken 8/28/2023 0354)  Absence of Physical Injury: Implement safety measures based on patient assessment     Problem: Pain  Goal: Verbalizes/displays adequate comfort level or baseline comfort level  Outcome: Progressing  Flowsheets (Taken 8/27/2023 1544)  Verbalizes/displays adequate comfort level or baseline comfort level:   Encourage patient to monitor pain and request assistance   Assess pain using appropriate pain scale   Administer analgesics based on type and severity of pain and evaluate response   Implement non-pharmacological measures as appropriate and evaluate response   Consider cultural and social influences on pain and pain management   Notify Licensed Independent Practitioner if interventions unsuccessful or patient reports new pain

## 2023-08-29 LAB
ALBUMIN SERPL-MCNC: 2.5 G/DL (ref 3.5–5.2)
ALP SERPL-CCNC: 93 U/L (ref 35–104)
ALT SERPL-CCNC: 27 U/L (ref 5–33)
ANION GAP SERPL CALCULATED.3IONS-SCNC: 12 MMOL/L (ref 7–19)
AST SERPL-CCNC: 44 U/L (ref 5–32)
BASOPHILS # BLD: 0 K/UL (ref 0–0.2)
BASOPHILS NFR BLD: 0 % (ref 0–1)
BILIRUB SERPL-MCNC: 1.9 MG/DL (ref 0.2–1.2)
BUN SERPL-MCNC: 27 MG/DL (ref 8–23)
BURR CELLS BLD QL SMEAR: ABNORMAL
CALCIUM SERPL-MCNC: 8.3 MG/DL (ref 8.8–10.2)
CHLORIDE SERPL-SCNC: 104 MMOL/L (ref 98–111)
CO2 SERPL-SCNC: 17 MMOL/L (ref 22–29)
CREAT SERPL-MCNC: 1 MG/DL (ref 0.5–0.9)
EOSINOPHIL # BLD: 0 K/UL (ref 0–0.6)
EOSINOPHIL NFR BLD: 0 % (ref 0–5)
ERYTHROCYTE [DISTWIDTH] IN BLOOD BY AUTOMATED COUNT: 15.8 % (ref 11.5–14.5)
GLUCOSE SERPL-MCNC: 104 MG/DL (ref 74–109)
HCT VFR BLD AUTO: 33.2 % (ref 37–47)
HGB BLD-MCNC: 11.3 G/DL (ref 12–16)
IMM GRANULOCYTES # BLD: 0.1 K/UL
LV EF: 58 %
LVEF MODALITY: NORMAL
LYMPHOCYTES # BLD: 1.3 K/UL (ref 1.1–4.5)
LYMPHOCYTES NFR BLD: 11 % (ref 20–40)
MACROCYTES BLD QL SMEAR: ABNORMAL
MCH RBC QN AUTO: 33.3 PG (ref 27–31)
MCHC RBC AUTO-ENTMCNC: 34 G/DL (ref 33–37)
MCV RBC AUTO: 97.9 FL (ref 81–99)
MONOCYTES # BLD: 0 K/UL (ref 0–0.9)
MONOCYTES NFR BLD: 0 % (ref 0–10)
NEUTROPHILS # BLD: 10.9 K/UL (ref 1.5–7.5)
NEUTS BAND NFR BLD MANUAL: 12 % (ref 0–5)
NEUTS SEG NFR BLD: 77 % (ref 50–65)
PLATELET # BLD AUTO: 259 K/UL (ref 130–400)
PLATELET SLIDE REVIEW: ABNORMAL
PMV BLD AUTO: 10.8 FL (ref 9.4–12.3)
POTASSIUM SERPL-SCNC: 4.5 MMOL/L (ref 3.5–5)
PROT SERPL-MCNC: 5.3 G/DL (ref 6.6–8.7)
RBC # BLD AUTO: 3.39 M/UL (ref 4.2–5.4)
SODIUM SERPL-SCNC: 133 MMOL/L (ref 136–145)
WBC # BLD AUTO: 12.2 K/UL (ref 4.8–10.8)

## 2023-08-29 PROCEDURE — 80053 COMPREHEN METABOLIC PANEL: CPT

## 2023-08-29 PROCEDURE — 6360000002 HC RX W HCPCS: Performed by: NURSE PRACTITIONER

## 2023-08-29 PROCEDURE — 97530 THERAPEUTIC ACTIVITIES: CPT

## 2023-08-29 PROCEDURE — 94760 N-INVAS EAR/PLS OXIMETRY 1: CPT

## 2023-08-29 PROCEDURE — 2700000000 HC OXYGEN THERAPY PER DAY

## 2023-08-29 PROCEDURE — 94640 AIRWAY INHALATION TREATMENT: CPT

## 2023-08-29 PROCEDURE — 6370000000 HC RX 637 (ALT 250 FOR IP): Performed by: NURSE PRACTITIONER

## 2023-08-29 PROCEDURE — 97116 GAIT TRAINING THERAPY: CPT

## 2023-08-29 PROCEDURE — 2580000003 HC RX 258: Performed by: STUDENT IN AN ORGANIZED HEALTH CARE EDUCATION/TRAINING PROGRAM

## 2023-08-29 PROCEDURE — 2580000003 HC RX 258: Performed by: NURSE PRACTITIONER

## 2023-08-29 PROCEDURE — 93306 TTE W/DOPPLER COMPLETE: CPT

## 2023-08-29 PROCEDURE — 85025 COMPLETE CBC W/AUTO DIFF WBC: CPT

## 2023-08-29 PROCEDURE — 6370000000 HC RX 637 (ALT 250 FOR IP): Performed by: HOSPITALIST

## 2023-08-29 PROCEDURE — 6360000002 HC RX W HCPCS: Performed by: STUDENT IN AN ORGANIZED HEALTH CARE EDUCATION/TRAINING PROGRAM

## 2023-08-29 PROCEDURE — 1210000000 HC MED SURG R&B

## 2023-08-29 PROCEDURE — 6370000000 HC RX 637 (ALT 250 FOR IP): Performed by: STUDENT IN AN ORGANIZED HEALTH CARE EDUCATION/TRAINING PROGRAM

## 2023-08-29 PROCEDURE — 36415 COLL VENOUS BLD VENIPUNCTURE: CPT

## 2023-08-29 RX ORDER — VERAPAMIL HYDROCHLORIDE 120 MG/1
120 TABLET, FILM COATED ORAL EVERY 12 HOURS SCHEDULED
Status: DISCONTINUED | OUTPATIENT
Start: 2023-08-29 | End: 2023-09-01

## 2023-08-29 RX ORDER — SODIUM CHLORIDE, SODIUM LACTATE, POTASSIUM CHLORIDE, CALCIUM CHLORIDE 600; 310; 30; 20 MG/100ML; MG/100ML; MG/100ML; MG/100ML
INJECTION, SOLUTION INTRAVENOUS CONTINUOUS
Status: DISCONTINUED | OUTPATIENT
Start: 2023-08-29 | End: 2023-08-30

## 2023-08-29 RX ORDER — DOXYCYCLINE HYCLATE 100 MG/1
100 CAPSULE ORAL EVERY 12 HOURS SCHEDULED
Status: DISCONTINUED | OUTPATIENT
Start: 2023-08-30 | End: 2023-09-01 | Stop reason: HOSPADM

## 2023-08-29 RX ORDER — METOPROLOL TARTRATE 5 MG/5ML
5 INJECTION INTRAVENOUS EVERY 6 HOURS PRN
Status: DISCONTINUED | OUTPATIENT
Start: 2023-08-29 | End: 2023-09-01 | Stop reason: HOSPADM

## 2023-08-29 RX ORDER — ENOXAPARIN SODIUM 100 MG/ML
40 INJECTION SUBCUTANEOUS DAILY
Status: DISCONTINUED | OUTPATIENT
Start: 2023-08-30 | End: 2023-09-01 | Stop reason: HOSPADM

## 2023-08-29 RX ADMIN — SODIUM CHLORIDE, PRESERVATIVE FREE 10 ML: 5 INJECTION INTRAVENOUS at 21:38

## 2023-08-29 RX ADMIN — IPRATROPIUM BROMIDE AND ALBUTEROL SULFATE 1 DOSE: 2.5; .5 SOLUTION RESPIRATORY (INHALATION) at 14:58

## 2023-08-29 RX ADMIN — IPRATROPIUM BROMIDE AND ALBUTEROL SULFATE 1 DOSE: 2.5; .5 SOLUTION RESPIRATORY (INHALATION) at 01:53

## 2023-08-29 RX ADMIN — AZITHROMYCIN MONOHYDRATE 500 MG: 500 INJECTION, POWDER, LYOPHILIZED, FOR SOLUTION INTRAVENOUS at 10:20

## 2023-08-29 RX ADMIN — IPRATROPIUM BROMIDE AND ALBUTEROL SULFATE 1 DOSE: 2.5; .5 SOLUTION RESPIRATORY (INHALATION) at 21:55

## 2023-08-29 RX ADMIN — SODIUM CHLORIDE, POTASSIUM CHLORIDE, SODIUM LACTATE AND CALCIUM CHLORIDE: 600; 310; 30; 20 INJECTION, SOLUTION INTRAVENOUS at 18:38

## 2023-08-29 RX ADMIN — GUAIFENESIN 600 MG: 600 TABLET, EXTENDED RELEASE ORAL at 09:15

## 2023-08-29 RX ADMIN — MONTELUKAST 10 MG: 10 TABLET, FILM COATED ORAL at 21:37

## 2023-08-29 RX ADMIN — METHYLPREDNISOLONE SODIUM SUCCINATE 40 MG: 40 INJECTION, POWDER, FOR SOLUTION INTRAMUSCULAR; INTRAVENOUS at 09:14

## 2023-08-29 RX ADMIN — ACETYLCYSTEINE 600 MG: 200 SOLUTION ORAL; RESPIRATORY (INHALATION) at 06:50

## 2023-08-29 RX ADMIN — IPRATROPIUM BROMIDE AND ALBUTEROL SULFATE 1 DOSE: 2.5; .5 SOLUTION RESPIRATORY (INHALATION) at 06:50

## 2023-08-29 RX ADMIN — SODIUM CHLORIDE, PRESERVATIVE FREE 10 ML: 5 INJECTION INTRAVENOUS at 09:17

## 2023-08-29 RX ADMIN — SODIUM CHLORIDE: 9 INJECTION, SOLUTION INTRAVENOUS at 05:44

## 2023-08-29 RX ADMIN — GUAIFENESIN 600 MG: 600 TABLET, EXTENDED RELEASE ORAL at 21:37

## 2023-08-29 RX ADMIN — VERAPAMIL HYDROCHLORIDE 120 MG: 120 TABLET ORAL at 21:37

## 2023-08-29 RX ADMIN — ENOXAPARIN SODIUM 30 MG: 100 INJECTION SUBCUTANEOUS at 09:14

## 2023-08-29 RX ADMIN — IPRATROPIUM BROMIDE AND ALBUTEROL SULFATE 1 DOSE: 2.5; .5 SOLUTION RESPIRATORY (INHALATION) at 18:05

## 2023-08-29 RX ADMIN — ASPIRIN 81 MG: 81 TABLET, COATED ORAL at 09:14

## 2023-08-29 RX ADMIN — CEFTRIAXONE 1000 MG: 1 INJECTION, POWDER, FOR SOLUTION INTRAMUSCULAR; INTRAVENOUS at 10:20

## 2023-08-29 RX ADMIN — IPRATROPIUM BROMIDE AND ALBUTEROL SULFATE 1 DOSE: 2.5; .5 SOLUTION RESPIRATORY (INHALATION) at 10:42

## 2023-08-29 RX ADMIN — VERAPAMIL HYDROCHLORIDE 120 MG: 120 TABLET ORAL at 11:09

## 2023-08-29 NOTE — PROGRESS NOTES
Automatic Dose Adjustment of                Subcutaneous Anticoagulant for Prophylaxis    Fuentes Carter is a 72 y.o. female. Recent Labs     08/28/23  0158 08/29/23  0628   CREATININE 1.2* 1.0*       Estimated Creatinine Clearance: 47 mL/min (A) (based on SCr of 1 mg/dL (H)). Weight:  Wt Readings from Last 1 Encounters:   08/28/23 157 lb (71.2 kg)           Pharmacy has adjusted subcutaneous anticoagulant for prophylaxis to Enoxaparin 40 mg SC daily based on the patient's weight and estimated CrCl per [facility] policy.              DARIUS NY, PHARM D, 8/29/2023, 1:23 PM

## 2023-08-29 NOTE — CARE COORDINATION
The [address] [facility] at [facility]  Notification of Admission Decision      [] Patient has been accepted for admit to [facility] on :       Please write discharge orders and summary prior to discharge. [x] Patient acceptance to Rehab pending the following : insurance approval    [] Eval in progress       [] Patient determined to be ineligible for services at [facility] because : We recommend you consider        Thank you for your referral, we appreciate you. If you have any questions, please feel   free to contact me at [phone].    Electronically Signed by Mayur Kothari, Admissions Coordinator 8/29/2023 12:11 PM

## 2023-08-29 NOTE — PLAN OF CARE
Problem: Skin/Tissue Integrity  Goal: Absence of new skin breakdown  Description: 1. Monitor for areas of redness and/or skin breakdown  2. Every 4-6 hours minimum:  Change oxygen saturation probe site  3. Every 4-6 hours:  If on nasal continuous positive airway pressure, respiratory therapy assess nares and determine need for appliance change or resting period.   Outcome: Progressing     Problem: Safety - Adult  Goal: Free from fall injury  Outcome: Progressing     Problem: ABCDS Injury Assessment  Goal: Absence of physical injury  Outcome: Progressing     Problem: Pain  Goal: Verbalizes/displays adequate comfort level or baseline comfort level  Outcome: Progressing     Problem: Nutrition Deficit:  Goal: Optimize nutritional status  Outcome: Progressing

## 2023-08-29 NOTE — PROGRESS NOTES
Attempted initial clinical bedside swallow/ speech/language/cognitive evaluation at 1422 on this date. Patient in ECHO at this time. Will attempt at a later time.       Electronically signed by PARISA Ramirez on 8/29/2023 at 2:31 PM

## 2023-08-29 NOTE — PROGRESS NOTES
Occupational Therapy  Pt out to endo this pm. Will continue to follow.  Electronically signed by JAY Carter on 8/29/2023 at 3:42 PM

## 2023-08-30 LAB
ALBUMIN SERPL-MCNC: 2.5 G/DL (ref 3.5–5.2)
ALP SERPL-CCNC: 85 U/L (ref 35–104)
ALT SERPL-CCNC: 23 U/L (ref 5–33)
ANION GAP SERPL CALCULATED.3IONS-SCNC: 19 MMOL/L (ref 7–19)
AST SERPL-CCNC: 27 U/L (ref 5–32)
BASOPHILS # BLD: 0 K/UL (ref 0–0.2)
BASOPHILS NFR BLD: 0 % (ref 0–1)
BILIRUB SERPL-MCNC: 0.9 MG/DL (ref 0.2–1.2)
BUN SERPL-MCNC: 30 MG/DL (ref 8–23)
BURR CELLS BLD QL SMEAR: ABNORMAL
CALCIUM SERPL-MCNC: 8.7 MG/DL (ref 8.8–10.2)
CHLORIDE SERPL-SCNC: 104 MMOL/L (ref 98–111)
CO2 SERPL-SCNC: 16 MMOL/L (ref 22–29)
CREAT SERPL-MCNC: 0.8 MG/DL (ref 0.5–0.9)
EKG P AXIS: 67 DEGREES
EKG P-R INTERVAL: 134 MS
EKG Q-T INTERVAL: 376 MS
EKG QRS DURATION: 80 MS
EKG QTC CALCULATION (BAZETT): 416 MS
EKG T AXIS: 38 DEGREES
EOSINOPHIL # BLD: 0 K/UL (ref 0–0.6)
EOSINOPHIL NFR BLD: 0 % (ref 0–5)
ERYTHROCYTE [DISTWIDTH] IN BLOOD BY AUTOMATED COUNT: 15.6 % (ref 11.5–14.5)
GLUCOSE SERPL-MCNC: 123 MG/DL (ref 74–109)
HCT VFR BLD AUTO: 30.3 % (ref 37–47)
HGB BLD-MCNC: 10.6 G/DL (ref 12–16)
IMM GRANULOCYTES # BLD: 0.2 K/UL
LYMPHOCYTES # BLD: 1 K/UL (ref 1.1–4.5)
LYMPHOCYTES NFR BLD: 6 % (ref 20–40)
MCH RBC QN AUTO: 33.3 PG (ref 27–31)
MCHC RBC AUTO-ENTMCNC: 35 G/DL (ref 33–37)
MCV RBC AUTO: 95.3 FL (ref 81–99)
MONOCYTES # BLD: 1 K/UL (ref 0–0.9)
MONOCYTES NFR BLD: 6 % (ref 0–10)
NEUTROPHILS # BLD: 14.2 K/UL (ref 1.5–7.5)
NEUTS BAND NFR BLD MANUAL: 3 % (ref 0–5)
NEUTS SEG NFR BLD: 85 % (ref 50–65)
PLATELET # BLD AUTO: 335 K/UL (ref 130–400)
PLATELET SLIDE REVIEW: ABNORMAL
PMV BLD AUTO: 10.4 FL (ref 9.4–12.3)
POTASSIUM SERPL-SCNC: 3.9 MMOL/L (ref 3.5–5)
PROT SERPL-MCNC: 5.1 G/DL (ref 6.6–8.7)
RBC # BLD AUTO: 3.18 M/UL (ref 4.2–5.4)
SODIUM SERPL-SCNC: 139 MMOL/L (ref 136–145)
TARGETS BLD QL SMEAR: ABNORMAL
TOXIC GRANULATION: ABNORMAL
WBC # BLD AUTO: 16.1 K/UL (ref 4.8–10.8)

## 2023-08-30 PROCEDURE — 94640 AIRWAY INHALATION TREATMENT: CPT

## 2023-08-30 PROCEDURE — 2500000003 HC RX 250 WO HCPCS: Performed by: STUDENT IN AN ORGANIZED HEALTH CARE EDUCATION/TRAINING PROGRAM

## 2023-08-30 PROCEDURE — 92610 EVALUATE SWALLOWING FUNCTION: CPT

## 2023-08-30 PROCEDURE — 97530 THERAPEUTIC ACTIVITIES: CPT

## 2023-08-30 PROCEDURE — 94760 N-INVAS EAR/PLS OXIMETRY 1: CPT

## 2023-08-30 PROCEDURE — 6360000002 HC RX W HCPCS: Performed by: NURSE PRACTITIONER

## 2023-08-30 PROCEDURE — 2580000003 HC RX 258: Performed by: NURSE PRACTITIONER

## 2023-08-30 PROCEDURE — 85025 COMPLETE CBC W/AUTO DIFF WBC: CPT

## 2023-08-30 PROCEDURE — 6370000000 HC RX 637 (ALT 250 FOR IP): Performed by: STUDENT IN AN ORGANIZED HEALTH CARE EDUCATION/TRAINING PROGRAM

## 2023-08-30 PROCEDURE — 92523 SPEECH SOUND LANG COMPREHEN: CPT

## 2023-08-30 PROCEDURE — 36415 COLL VENOUS BLD VENIPUNCTURE: CPT

## 2023-08-30 PROCEDURE — 2580000003 HC RX 258: Performed by: STUDENT IN AN ORGANIZED HEALTH CARE EDUCATION/TRAINING PROGRAM

## 2023-08-30 PROCEDURE — 80053 COMPREHEN METABOLIC PANEL: CPT

## 2023-08-30 PROCEDURE — 1210000000 HC MED SURG R&B

## 2023-08-30 PROCEDURE — 97110 THERAPEUTIC EXERCISES: CPT

## 2023-08-30 PROCEDURE — 6370000000 HC RX 637 (ALT 250 FOR IP): Performed by: NURSE PRACTITIONER

## 2023-08-30 PROCEDURE — 2700000000 HC OXYGEN THERAPY PER DAY

## 2023-08-30 RX ORDER — PREDNISONE 20 MG/1
40 TABLET ORAL DAILY
Status: DISCONTINUED | OUTPATIENT
Start: 2023-08-30 | End: 2023-09-01 | Stop reason: HOSPADM

## 2023-08-30 RX ORDER — IPRATROPIUM BROMIDE AND ALBUTEROL SULFATE 2.5; .5 MG/3ML; MG/3ML
1 SOLUTION RESPIRATORY (INHALATION)
Status: DISCONTINUED | OUTPATIENT
Start: 2023-08-30 | End: 2023-08-31

## 2023-08-30 RX ADMIN — IPRATROPIUM BROMIDE AND ALBUTEROL SULFATE 1 DOSE: 2.5; .5 SOLUTION RESPIRATORY (INHALATION) at 07:55

## 2023-08-30 RX ADMIN — SODIUM CHLORIDE, PRESERVATIVE FREE 10 ML: 5 INJECTION INTRAVENOUS at 10:02

## 2023-08-30 RX ADMIN — VERAPAMIL HYDROCHLORIDE 120 MG: 120 TABLET ORAL at 21:12

## 2023-08-30 RX ADMIN — PREDNISONE 40 MG: 20 TABLET ORAL at 10:31

## 2023-08-30 RX ADMIN — VERAPAMIL HYDROCHLORIDE 120 MG: 120 TABLET ORAL at 09:52

## 2023-08-30 RX ADMIN — DOXYCYCLINE HYCLATE 100 MG: 100 CAPSULE ORAL at 21:12

## 2023-08-30 RX ADMIN — SODIUM CHLORIDE, PRESERVATIVE FREE 10 ML: 5 INJECTION INTRAVENOUS at 21:14

## 2023-08-30 RX ADMIN — CEFTRIAXONE 1000 MG: 1 INJECTION, POWDER, FOR SOLUTION INTRAMUSCULAR; INTRAVENOUS at 09:55

## 2023-08-30 RX ADMIN — DOXYCYCLINE HYCLATE 100 MG: 100 CAPSULE ORAL at 09:52

## 2023-08-30 RX ADMIN — IPRATROPIUM BROMIDE AND ALBUTEROL SULFATE 1 DOSE: 2.5; .5 SOLUTION RESPIRATORY (INHALATION) at 18:13

## 2023-08-30 RX ADMIN — ASPIRIN 81 MG: 81 TABLET, COATED ORAL at 09:52

## 2023-08-30 RX ADMIN — MONTELUKAST 10 MG: 10 TABLET, FILM COATED ORAL at 21:12

## 2023-08-30 RX ADMIN — GUAIFENESIN 600 MG: 600 TABLET, EXTENDED RELEASE ORAL at 09:52

## 2023-08-30 RX ADMIN — GUAIFENESIN 600 MG: 600 TABLET, EXTENDED RELEASE ORAL at 21:12

## 2023-08-30 RX ADMIN — SODIUM CHLORIDE, POTASSIUM CHLORIDE, SODIUM LACTATE AND CALCIUM CHLORIDE: 600; 310; 30; 20 INJECTION, SOLUTION INTRAVENOUS at 04:00

## 2023-08-30 RX ADMIN — IPRATROPIUM BROMIDE AND ALBUTEROL SULFATE 1 DOSE: 2.5; .5 SOLUTION RESPIRATORY (INHALATION) at 14:24

## 2023-08-30 RX ADMIN — SODIUM BICARBONATE: 84 INJECTION, SOLUTION INTRAVENOUS at 10:09

## 2023-08-30 RX ADMIN — ENOXAPARIN SODIUM 40 MG: 100 INJECTION SUBCUTANEOUS at 09:52

## 2023-08-30 NOTE — CARE COORDINATION
The [address] [facility] at [facility]  Notification of Admission Decision      [] Patient has been accepted for admit to [facility] on :       Please write discharge orders and summary prior to discharge. [] Patient acceptance to Rehab pending the following :    [] Eval in progress       [x] Patient determined to be ineligible for services at [facility] because : Insurance offering peer to peer prior to giving a denial. Dr. Erika Santo declined P2P therefore denial given. We recommend you consider : SNF       Thank you for your referral, we appreciate you. If you have any questions, please feel   free to contact me at [phone].    Electronically Signed by Micha Diaz Admissions Coordinator 8/30/2023 12:46 PM

## 2023-08-30 NOTE — PROGRESS NOTES
Physical Therapy  Name: Judie Garcia  MRN:  [id number]  Date of service:  8/30/2023 08/30/23 1552   Restrictions/Precautions   Restrictions/Precautions Fall Risk   Subjective   Subjective Pt agreed to therapy. Pain Assessment   Pain Assessment None - Denies Pain   Bed Mobility   Supine to Sit Stand by assistance   Transfers   Sit to Stand Contact guard assistance   Stand to Sit Contact guard assistance   Bed to Chair Contact guard assistance  (stand step tf with RW bed to bsc)   Ambulation   Surface Level tile   Device Rolling Walker   Assistance Contact guard assistance   Quality of Gait slow but steady/ SOA easily   Gait Deviations Slow [facility];Decreased step length;Decreased step height   Distance 4'   Exercises   Hip Flexion X10   Knee Long Arc Quad X10   Ankle Pumps X10   Comments BLE exercise   Patient Goals    Patient Goals  go home   Short Term Goals   Time Frame for Short Term Goals 2 wks   Short Term Goal 1 supine to sit indep   Short Term Goal 2 sit to stand indep   Short Term Goal 3 amb. 100' with RW SBA   Short Term Goal 4 bed to chair SBA   Conditions Requiring Skilled Therapeutic Intervention   Body Structures, Functions, Activity Limitations Requiring Skilled Therapeutic Intervention Decreased functional mobility ; Decreased ROM; Decreased ADL status; Decreased body mechanics; Decreased endurance;Decreased strength;Decreased sensation;Decreased balance; Increased pain;Decreased posture   Assessment Pt able to work on transfers and exercise but quickly fatigues. V/c's to take deep breaths in through nose. Fairly steady with transfers using RW. Assisted up to chair with all needs in reach. Activity Tolerance   Activity Tolerance Patient limited by endurance   PT Plan of Care   Wednesday X   Safety Devices   Type of Devices Call light within reach; Chair alarm in place; Left in chair         Electronically signed by Douglas Barone PTA on 8/30/2023 at 3:57 PM

## 2023-08-30 NOTE — PROGRESS NOTES
mild-moderately decreased for swallow airway protection. Immediate cough response noted with ice chips. Delayed cough response noted with thin liquids and regular solids. No overt s/s of aspiration/penetration observed with mildly (nectar) thick liquids or puree consistencies. Do note increased rate of breathing with all trials. Breath sounds are audible before, during, and after P.O. trials. Did notify nursing. Recommend trial soft and bite sized consistencies with mildly (nectar) thick liquids. Medications administered whole with applesauce/pudding. Please monitor/notify SLP for the following: changes in lung sounds, spikes in temperature, and/or difficulties swallowing. Would benefit from continued skilled speech therapy services. SLP will continue to follow and treat. Recent Chest Xray: ( Date 8/27/23 )    EXAM:  SINGLE FRONTAL VIEW OF THE CHEST     HISTORY: Weakness. COMPARISON:  CT chest 05/22/2023     FINDINGS: The cardiomediastinal silhouette is unremarkable. There is no pneumothorax or effusion. There is no consolidation, nodule or mass. The there are bilateral interstitial ground-glass opacities. The osseous structures are unremarkable. IMPRESSION:  Bilateral interstitial and ground-glass opacities in the lungs suggestive of atypical infection. Underlying changes of chronic obstructive pulmonary disease are also present.           ______________________________________   Electronically signed by: Bryan Gentile M.D.   Date:     08/27/2023  Time:    09:51       Current Diet level:  Current Diet : Regular    Pain:  Pain Assessment  Pain Assessment: 0-10  Pain Level: 0  Pain Location: Back  Response to Pain Intervention: Other (comment) (eyes closed)  Side Effects: No reported side effects    Reason for Referral  Chris Kaplan was referred for a bedside swallow evaluation to assess the efficiency of her swallow function, identify signs and symptoms of aspiration and make functioning tasks with 90% accuracy and minimal cues. Goal 11: Patient will complete auditory comprehension/verbal expression tasks with 90% accuracy and minimal cues. Goal 12: Patient will complete higher level orientation tasks with 90% accuracy and minimal cues. General  Chart Reviewed: Yes  Behavior/Cognition: Alert; Cooperative  Temperature Spikes Noted: No  Respiratory Status: O2 via nasual cannula  O2 Device: Nasal cannula  Liters of Oxygen: 4 L  Communication Observation: Dysarthria  Follows Directions: Simple  Dentition: Some missing teeth;Poor dental/oral hygeine  Baseline Vocal Quality: Weak;Hoarse  Volitional Cough: Weak  Volitional Swallow: Delayed  Prior Dysphagia History: Does report dysphagia with thin liquids for approximately 3 weeks. Consistencies Administered: Regular;Pureed;Mildly Thick - cup; Thin - cup; Ice Chips    Oral Motor Deficits  Labial: Decreased rate;Decreased seal  Dentition: Natural  Lingual: Decreased rate;Decreased strength  Consistencies Administered: Regular;Pureed;Mildly Thick - cup; Thin - cup; Ice Chips    Education  Patient Education: Patient and nursing educated on diet recommendations. Patient Education Response: Verbalizes understanding       Communication Observation  Speech assessment completed on this date. Dysarthria is present as characterized by decreased breath support, decreased vocal intensity, and decreased lingual and labial ROM, strength, and coordination. Clinician ranks speech intelligibility approximately 80-90% intelligible with context known and background noise present. Speech/Language/Cognition  Portions of the RIPA/WAB and the Mini-Mental State Examination completed on this date. Patient obtained a 20/30 on the Mini-Mental State Examination revealing mild cognitive deficits. Deficits within the following areas noted: auditory comprehension, orientation, attention/processing, recall, problem solving, safety awareness, and executive functioning.

## 2023-08-30 NOTE — CARE COORDINATION
Met with patient, patient's spouse, and daughter to inform them that her insurance denied approval for IP Rehab. Provided SNF Choice List.  They are adamant that patient will not go to a SNF for Rehab. They wish for patient to go home with Home Health. Explained that [facility] could be ordered. Reviewed equipment needed at home. Patient has a Rollator. Requested a BSC. Patient prefers for equipment to be ordered from [facility]. Her daughter will call [facility] at discharge for delivery of equipment to patient's home. If home oxygen is required, patient also prefers for home oxygen to be ordered from [facility].   [address] [address] [address] [phone]  F [phone]  Electronically signed by Diallo Roque RN on 8/30/2023 at 1:51 PM

## 2023-08-31 LAB
ALBUMIN SERPL-MCNC: 2.5 G/DL (ref 3.5–5.2)
ALP SERPL-CCNC: 88 U/L (ref 35–104)
ALT SERPL-CCNC: 30 U/L (ref 5–33)
ANION GAP SERPL CALCULATED.3IONS-SCNC: 13 MMOL/L (ref 7–19)
ANISOCYTOSIS BLD QL SMEAR: ABNORMAL
AST SERPL-CCNC: 38 U/L (ref 5–32)
BASOPHILS # BLD: 0 K/UL (ref 0–0.2)
BASOPHILS NFR BLD: 0 % (ref 0–1)
BILIRUB SERPL-MCNC: 0.6 MG/DL (ref 0.2–1.2)
BUN SERPL-MCNC: 27 MG/DL (ref 8–23)
CALCIUM SERPL-MCNC: 9 MG/DL (ref 8.8–10.2)
CHLORIDE SERPL-SCNC: 104 MMOL/L (ref 98–111)
CO2 SERPL-SCNC: 21 MMOL/L (ref 22–29)
CREAT SERPL-MCNC: 0.6 MG/DL (ref 0.5–0.9)
EOSINOPHIL # BLD: 0 K/UL (ref 0–0.6)
EOSINOPHIL NFR BLD: 0 % (ref 0–5)
ERYTHROCYTE [DISTWIDTH] IN BLOOD BY AUTOMATED COUNT: 15.7 % (ref 11.5–14.5)
GLUCOSE SERPL-MCNC: 126 MG/DL (ref 74–109)
HCT VFR BLD AUTO: 30.1 % (ref 37–47)
HGB BLD-MCNC: 10.5 G/DL (ref 12–16)
IMM GRANULOCYTES # BLD: 0.5 K/UL
LYMPHOCYTES # BLD: 0.4 K/UL (ref 1.1–4.5)
LYMPHOCYTES NFR BLD: 2 % (ref 20–40)
MCH RBC QN AUTO: 33.1 PG (ref 27–31)
MCHC RBC AUTO-ENTMCNC: 34.9 G/DL (ref 33–37)
MCV RBC AUTO: 95 FL (ref 81–99)
MONOCYTES # BLD: 0.4 K/UL (ref 0–0.9)
MONOCYTES NFR BLD: 2 % (ref 0–10)
MYELOCYTES NFR BLD MANUAL: 1 %
NEUTROPHILS # BLD: 20.5 K/UL (ref 1.5–7.5)
NEUTS BAND NFR BLD MANUAL: 3 % (ref 0–5)
NEUTS SEG NFR BLD: 92 % (ref 50–65)
PLATELET # BLD AUTO: 435 K/UL (ref 130–400)
PLATELET SLIDE REVIEW: ABNORMAL
PMV BLD AUTO: 10.1 FL (ref 9.4–12.3)
POTASSIUM SERPL-SCNC: 3.7 MMOL/L (ref 3.5–5)
PROT SERPL-MCNC: 4.9 G/DL (ref 6.6–8.7)
RBC # BLD AUTO: 3.17 M/UL (ref 4.2–5.4)
SODIUM SERPL-SCNC: 138 MMOL/L (ref 136–145)
TOXIC GRANULATION: ABNORMAL
WBC # BLD AUTO: 21.4 K/UL (ref 4.8–10.8)

## 2023-08-31 PROCEDURE — 6370000000 HC RX 637 (ALT 250 FOR IP): Performed by: STUDENT IN AN ORGANIZED HEALTH CARE EDUCATION/TRAINING PROGRAM

## 2023-08-31 PROCEDURE — 92526 ORAL FUNCTION THERAPY: CPT

## 2023-08-31 PROCEDURE — 6360000002 HC RX W HCPCS: Performed by: NURSE PRACTITIONER

## 2023-08-31 PROCEDURE — 2580000003 HC RX 258: Performed by: STUDENT IN AN ORGANIZED HEALTH CARE EDUCATION/TRAINING PROGRAM

## 2023-08-31 PROCEDURE — 36415 COLL VENOUS BLD VENIPUNCTURE: CPT

## 2023-08-31 PROCEDURE — 80053 COMPREHEN METABOLIC PANEL: CPT

## 2023-08-31 PROCEDURE — 94640 AIRWAY INHALATION TREATMENT: CPT

## 2023-08-31 PROCEDURE — 6370000000 HC RX 637 (ALT 250 FOR IP): Performed by: NURSE PRACTITIONER

## 2023-08-31 PROCEDURE — 92507 TX SP LANG VOICE COMM INDIV: CPT

## 2023-08-31 PROCEDURE — 94761 N-INVAS EAR/PLS OXIMETRY MLT: CPT

## 2023-08-31 PROCEDURE — 85025 COMPLETE CBC W/AUTO DIFF WBC: CPT

## 2023-08-31 PROCEDURE — 2500000003 HC RX 250 WO HCPCS: Performed by: STUDENT IN AN ORGANIZED HEALTH CARE EDUCATION/TRAINING PROGRAM

## 2023-08-31 PROCEDURE — 1210000000 HC MED SURG R&B

## 2023-08-31 PROCEDURE — 2580000003 HC RX 258: Performed by: NURSE PRACTITIONER

## 2023-08-31 PROCEDURE — 94618 PULMONARY STRESS TESTING: CPT

## 2023-08-31 PROCEDURE — 2700000000 HC OXYGEN THERAPY PER DAY

## 2023-08-31 PROCEDURE — 94760 N-INVAS EAR/PLS OXIMETRY 1: CPT

## 2023-08-31 PROCEDURE — 6360000002 HC RX W HCPCS: Performed by: STUDENT IN AN ORGANIZED HEALTH CARE EDUCATION/TRAINING PROGRAM

## 2023-08-31 RX ORDER — ALBUTEROL SULFATE 90 UG/1
2 AEROSOL, METERED RESPIRATORY (INHALATION) EVERY 4 HOURS PRN
Qty: 18 G | Refills: 0 | Status: ON HOLD | OUTPATIENT
Start: 2023-08-31

## 2023-08-31 RX ORDER — ALBUTEROL SULFATE 2.5 MG/3ML
2.5 SOLUTION RESPIRATORY (INHALATION) EVERY 6 HOURS
Status: DISCONTINUED | OUTPATIENT
Start: 2023-08-31 | End: 2023-09-01 | Stop reason: HOSPADM

## 2023-08-31 RX ORDER — PREDNISONE 20 MG/1
40 TABLET ORAL DAILY
Qty: 6 TABLET | Refills: 0 | Status: SHIPPED | OUTPATIENT
Start: 2023-09-01 | End: 2023-09-04

## 2023-08-31 RX ORDER — CEFDINIR 300 MG/1
300 CAPSULE ORAL 2 TIMES DAILY
Qty: 4 CAPSULE | Refills: 0 | Status: SHIPPED | OUTPATIENT
Start: 2023-09-01 | End: 2023-09-03

## 2023-08-31 RX ORDER — GUAIFENESIN/DEXTROMETHORPHAN 100-10MG/5
5 SYRUP ORAL EVERY 4 HOURS PRN
Status: DISCONTINUED | OUTPATIENT
Start: 2023-08-31 | End: 2023-09-01 | Stop reason: HOSPADM

## 2023-08-31 RX ORDER — GUAIFENESIN/DEXTROMETHORPHAN 100-10MG/5
5 SYRUP ORAL 3 TIMES DAILY PRN
Qty: 120 ML | Refills: 0 | Status: ON HOLD | OUTPATIENT
Start: 2023-08-31 | End: 2023-09-10

## 2023-08-31 RX ORDER — DOXYCYCLINE HYCLATE 100 MG
100 TABLET ORAL 2 TIMES DAILY
Qty: 4 TABLET | Refills: 0 | Status: SHIPPED | OUTPATIENT
Start: 2023-09-01 | End: 2023-09-03

## 2023-08-31 RX ADMIN — VERAPAMIL HYDROCHLORIDE 120 MG: 120 TABLET ORAL at 09:22

## 2023-08-31 RX ADMIN — SODIUM BICARBONATE: 84 INJECTION, SOLUTION INTRAVENOUS at 09:39

## 2023-08-31 RX ADMIN — VERAPAMIL HYDROCHLORIDE 120 MG: 120 TABLET ORAL at 19:59

## 2023-08-31 RX ADMIN — MONTELUKAST 10 MG: 10 TABLET, FILM COATED ORAL at 19:59

## 2023-08-31 RX ADMIN — GUAIFENESIN 600 MG: 600 TABLET, EXTENDED RELEASE ORAL at 09:22

## 2023-08-31 RX ADMIN — CEFTRIAXONE 1000 MG: 1 INJECTION, POWDER, FOR SOLUTION INTRAMUSCULAR; INTRAVENOUS at 10:14

## 2023-08-31 RX ADMIN — SODIUM CHLORIDE, PRESERVATIVE FREE 10 ML: 5 INJECTION INTRAVENOUS at 09:22

## 2023-08-31 RX ADMIN — DOXYCYCLINE HYCLATE 100 MG: 100 CAPSULE ORAL at 09:22

## 2023-08-31 RX ADMIN — PREDNISONE 40 MG: 20 TABLET ORAL at 09:22

## 2023-08-31 RX ADMIN — ALBUTEROL SULFATE 2.5 MG: 2.5 SOLUTION RESPIRATORY (INHALATION) at 19:03

## 2023-08-31 RX ADMIN — DOXYCYCLINE HYCLATE 100 MG: 100 CAPSULE ORAL at 19:59

## 2023-08-31 RX ADMIN — ALBUTEROL SULFATE 2.5 MG: 2.5 SOLUTION RESPIRATORY (INHALATION) at 15:08

## 2023-08-31 RX ADMIN — ASPIRIN 81 MG: 81 TABLET, COATED ORAL at 09:22

## 2023-08-31 RX ADMIN — IPRATROPIUM BROMIDE AND ALBUTEROL SULFATE 1 DOSE: 2.5; .5 SOLUTION RESPIRATORY (INHALATION) at 07:10

## 2023-08-31 RX ADMIN — ENOXAPARIN SODIUM 40 MG: 100 INJECTION SUBCUTANEOUS at 09:22

## 2023-08-31 RX ADMIN — GUAIFENESIN SYRUP AND DEXTROMETHORPHAN 5 ML: 100; 10 SYRUP ORAL at 13:08

## 2023-08-31 NOTE — CARE COORDINATION
Second IMM given to patient and explained with patient verbalizing understanding. All questions and concerns addressed     Signed letter placed in pt soft chart  Patient declined waiting 4 hr period prior to discharge.   Electronically signed by CALVIN Arreguin on 8/31/2023 at 9:37 AM     08/31/23 0937   IMM Letter   IMM Letter given to Patient/Family/Significant other/Guardian/POA/by: second IMm letter given by Kishan Short   IMM Letter date given: 08/31/23   IMM Letter time given: 0920

## 2023-08-31 NOTE — PLAN OF CARE
Problem: Skin/Tissue Integrity  Goal: Absence of new skin breakdown  Description: 1. Monitor for areas of redness and/or skin breakdown  2. Every 4-6 hours minimum:  Change oxygen saturation probe site  3. Every 4-6 hours:  If on nasal continuous positive airway pressure, respiratory therapy assess nares and determine need for appliance change or resting period.   8/30/2023 2357 by Nesha Simental RN  Outcome: Progressing  8/30/2023 1711 by Kvng Villegas RN  Outcome: Progressing     Problem: Safety - Adult  Goal: Free from fall injury  8/30/2023 2357 by Nesha Simental RN  Outcome: Progressing  8/30/2023 1711 by Kvng Villegas RN  Outcome: Progressing     Problem: ABCDS Injury Assessment  Goal: Absence of physical injury  8/30/2023 2357 by Nesha Simental RN  Outcome: Progressing  8/30/2023 1711 by Kvng Villegas RN  Outcome: Progressing     Problem: Pain  Goal: Verbalizes/displays adequate comfort level or baseline comfort level  8/30/2023 2357 by Nesha Simental RN  Outcome: Progressing  8/30/2023 1711 by Kvng Villegas RN  Outcome: Progressing     Problem: Nutrition Deficit:  Goal: Optimize nutritional status  8/30/2023 2357 by Nesha Simental RN  Outcome: Progressing  8/30/2023 1711 by Kvng Villegas RN  Outcome: Progressing

## 2023-08-31 NOTE — PROGRESS NOTES
Facility/Department: [facility] 4 ONCOLOGY UNIT  SWALLOW THERAPY  SPEECH THERAPY     NAME: Rm Dos Santos  : 1950  MRN: [id number]    ADMISSION DATE: 2023  ADMITTING DIAGNOSIS: has Mastodynia of right breast; Community acquired pneumonia, bilateral; Hypertension; Sepsis ([address]); PATEL (acute kidney injury) ([address]); Hyponatremia; COPD (chronic obstructive pulmonary disease) ([address]); and Acute respiratory failure ([address]) on their problem list.    Date of Treat: 2023  Evaluating Therapist: PARISA Koenig    Current Diet level:  Soft and bite sized consistency with mildly thick/nectar thick liquids    Pain:  Pain Assessment  Pain Assessment: None - Denies Pain  Pain Level: 0  Pain Location: Back  Response to Pain Intervention: Other (comment) (eyes closed)  Side Effects: No reported side effects    Reason for Referral  Rm Dos Santos was referred for a bedside swallow evaluation to assess the efficiency of her swallow function, identify signs and symptoms of aspiration and make recommendations regarding safe dietary consistencies, effective compensatory strategies, and safe eating environment. Impression  Re-assessed patient's swallowing function. Patient exhibited slow and decreased oral prep and decreased oral transit of more solid consistencies, fast oral transit and suspected swallow delay with thin liquids, and sluggish, inconsistently mildly decreased laryngeal elevation for swallow airway protection. No outward S/S penetration/aspiration was noted with any ice chip trial, puree consistency presentation, regular solid consistency trial, or nectar thick liquid presentation administered during treatments session this date. Delayed coughs were observed with thin H2O trials. At this time, would trial easy to chew consistency. Continue mildly thick/nectar thick liquids. Recommend meds crushed in pudding/applesauce.  If patient receives mouth care prior to intake, okay for ice chips and small sips regular water IN BETWEEN MEALS for comfort. Will continue to follow. Treatment Plan  Requires SLP Intervention: Yes     Recommended Diet and Intervention  Diet Solids Recommendation: Easy to chew  Liquid Consistency Recommendation: Mildly thick (nectar)  Recommended Form of Meds: Meds crushed in puree as able  Therapeutic Interventions: Patient/Family education;Diet tolerance monitoring; Therapeutic PO trials with SLP     Compensatory Swallowing Strategies  Compensatory Swallowing Strategies: Upright as possible for all oral intake;Small bites/sips;Eat/Feed slowly; Alternate solids and liquids; Remain upright for 30-45 minutes after meals       General  Chart Reviewed: Yes  Behavior/Cognition: Alert; Cooperative  O2 Device: Nasal Cannula  Communication Observation: (Re-assessed patient's speech production. Patient exhibited slow, decreased lingual movements during verbalizations with low volume noted. SLP still ranked functional intelligibility of speech for unfamiliar listeners at 100% in utterances with background noise present.)  Follows Directions: Simple   Patient Positioning: Upright in bed  Consistencies Administered: Ice chips;Dysphagia Pureed (Dysphagia I); Regular solid; Nectar - straw; Thin - straw     Oral Motor Examination   Labial ROM: (Decreased, bilaterally, during labial protrusion trials.)  Labial Strength: (Adequate during labial compression trials.)  Labial Coordination: (Adequate movements were noted.)  Lingual ROM: (Adequate during lingual extension trials with full point achieved; decreased during lingual elevation trials without use of accessory jaw movement; decreased movements noted bilaterally.)  Lingual Strength: (Decreased)  Lingual Coordination: (Slowed movements were noted.)     Re-assessed patient's swallowing function with the following observations noted:     Oral Phase  Mastication: Ice chips;Regular solid (Patient exhibited slow oral prep with decreased rotary jaw movement noted during ice chip

## 2023-08-31 NOTE — PLAN OF CARE
Problem: Skin/Tissue Integrity  Goal: Absence of new skin breakdown  Description: 1. Monitor for areas of redness and/or skin breakdown  2. Every 4-6 hours minimum:  Change oxygen saturation probe site  3. Every 4-6 hours:  If on nasal continuous positive airway pressure, respiratory therapy assess nares and determine need for appliance change or resting period.   8/31/2023 1237 by Leland Londono LPN  Outcome: Progressing  8/30/2023 2357 by Chidi Quezada RN  Outcome: Progressing     Problem: Safety - Adult  Goal: Free from fall injury  8/31/2023 1237 by Lelnad Londono LPN  Outcome: Progressing  8/30/2023 2357 by Chidi Quezada RN  Outcome: Progressing     Problem: ABCDS Injury Assessment  Goal: Absence of physical injury  8/31/2023 1237 by Leland Londono LPN  Outcome: Progressing  8/30/2023 2357 by Chidi Quezada RN  Outcome: Progressing     Problem: Pain  Goal: Verbalizes/displays adequate comfort level or baseline comfort level  8/31/2023 1237 by Leland Londono LPN  Outcome: Progressing  8/30/2023 2357 by Chidi Quezada RN  Outcome: Progressing     Problem: Nutrition Deficit:  Goal: Optimize nutritional status  8/31/2023 1237 by Leland Londono LPN  Outcome: Progressing  8/30/2023 2357 by Chidi Quezada RN  Outcome: Progressing

## 2023-08-31 NOTE — PROGRESS NOTES
Home O2 eval: while at rest on RA spo2 decreased to 86%. Had to place pt on 2 LPM to get above 88%. With exertion on 2 LPM spo2 was 90%.

## 2023-09-01 ENCOUNTER — APPOINTMENT (OUTPATIENT)
Dept: GENERAL RADIOLOGY | Age: 73
End: 2023-09-01
Attending: STUDENT IN AN ORGANIZED HEALTH CARE EDUCATION/TRAINING PROGRAM
Payer: MEDICARE

## 2023-09-01 VITALS
TEMPERATURE: 98.1 F | BODY MASS INDEX: 29.57 KG/M2 | WEIGHT: 156.6 LBS | OXYGEN SATURATION: 90 % | DIASTOLIC BLOOD PRESSURE: 67 MMHG | SYSTOLIC BLOOD PRESSURE: 138 MMHG | HEART RATE: 91 BPM | HEIGHT: 61 IN | RESPIRATION RATE: 22 BRPM

## 2023-09-01 LAB
ALBUMIN SERPL-MCNC: 2 G/DL (ref 3.5–5.2)
ALP SERPL-CCNC: 86 U/L (ref 35–104)
ALT SERPL-CCNC: 25 U/L (ref 5–33)
ANION GAP SERPL CALCULATED.3IONS-SCNC: 12 MMOL/L (ref 7–19)
ANISOCYTOSIS BLD QL SMEAR: ABNORMAL
AST SERPL-CCNC: 26 U/L (ref 5–32)
BACTERIA BLD CULT ORG #2: NORMAL
BACTERIA BLD CULT: NORMAL
BASOPHILS # BLD: 0 K/UL (ref 0–0.2)
BASOPHILS NFR BLD: 0 % (ref 0–1)
BILIRUB SERPL-MCNC: 0.6 MG/DL (ref 0.2–1.2)
BUN SERPL-MCNC: 20 MG/DL (ref 8–23)
CALCIUM SERPL-MCNC: 9 MG/DL (ref 8.8–10.2)
CHLORIDE SERPL-SCNC: 106 MMOL/L (ref 98–111)
CO2 SERPL-SCNC: 23 MMOL/L (ref 22–29)
CREAT SERPL-MCNC: 0.6 MG/DL (ref 0.5–0.9)
EOSINOPHIL # BLD: 0 K/UL (ref 0–0.6)
EOSINOPHIL NFR BLD: 0 % (ref 0–5)
ERYTHROCYTE [DISTWIDTH] IN BLOOD BY AUTOMATED COUNT: 15.8 % (ref 11.5–14.5)
GLUCOSE SERPL-MCNC: 119 MG/DL (ref 74–109)
HCT VFR BLD AUTO: 31.2 % (ref 37–47)
HGB BLD-MCNC: 10.7 G/DL (ref 12–16)
IMM GRANULOCYTES # BLD: 1 K/UL
LYMPHOCYTES # BLD: 1.1 K/UL (ref 1.1–4.5)
LYMPHOCYTES NFR BLD: 5 % (ref 20–40)
MAGNESIUM SERPL-MCNC: 1.5 MG/DL (ref 1.6–2.4)
MCH RBC QN AUTO: 32.6 PG (ref 27–31)
MCHC RBC AUTO-ENTMCNC: 34.3 G/DL (ref 33–37)
MCV RBC AUTO: 95.1 FL (ref 81–99)
METAMYELOCYTES NFR BLD MANUAL: 1 %
MONOCYTES # BLD: 2.2 K/UL (ref 0–0.9)
MONOCYTES NFR BLD: 10 % (ref 0–10)
MYELOCYTES NFR BLD MANUAL: 1 %
NEUTROPHILS # BLD: 18.7 K/UL (ref 1.5–7.5)
NEUTS BAND NFR BLD MANUAL: 2 % (ref 0–5)
NEUTS SEG NFR BLD: 81 % (ref 50–65)
PLATELET # BLD AUTO: 483 K/UL (ref 130–400)
PLATELET SLIDE REVIEW: ABNORMAL
PMV BLD AUTO: 10.2 FL (ref 9.4–12.3)
POTASSIUM SERPL-SCNC: 3.3 MMOL/L (ref 3.5–5)
PROT SERPL-MCNC: 5.5 G/DL (ref 6.6–8.7)
RBC # BLD AUTO: 3.28 M/UL (ref 4.2–5.4)
SODIUM SERPL-SCNC: 141 MMOL/L (ref 136–145)
WBC # BLD AUTO: 22 K/UL (ref 4.8–10.8)

## 2023-09-01 PROCEDURE — 2700000000 HC OXYGEN THERAPY PER DAY

## 2023-09-01 PROCEDURE — 85025 COMPLETE CBC W/AUTO DIFF WBC: CPT

## 2023-09-01 PROCEDURE — 74230 X-RAY XM SWLNG FUNCJ C+: CPT

## 2023-09-01 PROCEDURE — 83735 ASSAY OF MAGNESIUM: CPT

## 2023-09-01 PROCEDURE — 80053 COMPREHEN METABOLIC PANEL: CPT

## 2023-09-01 PROCEDURE — 6370000000 HC RX 637 (ALT 250 FOR IP): Performed by: STUDENT IN AN ORGANIZED HEALTH CARE EDUCATION/TRAINING PROGRAM

## 2023-09-01 PROCEDURE — 6360000002 HC RX W HCPCS: Performed by: STUDENT IN AN ORGANIZED HEALTH CARE EDUCATION/TRAINING PROGRAM

## 2023-09-01 PROCEDURE — 6360000002 HC RX W HCPCS: Performed by: NURSE PRACTITIONER

## 2023-09-01 PROCEDURE — 92611 MOTION FLUOROSCOPY/SWALLOW: CPT

## 2023-09-01 PROCEDURE — 36415 COLL VENOUS BLD VENIPUNCTURE: CPT

## 2023-09-01 PROCEDURE — 6370000000 HC RX 637 (ALT 250 FOR IP): Performed by: NURSE PRACTITIONER

## 2023-09-01 PROCEDURE — 2580000003 HC RX 258: Performed by: NURSE PRACTITIONER

## 2023-09-01 PROCEDURE — 92526 ORAL FUNCTION THERAPY: CPT

## 2023-09-01 PROCEDURE — 97530 THERAPEUTIC ACTIVITIES: CPT

## 2023-09-01 PROCEDURE — 94760 N-INVAS EAR/PLS OXIMETRY 1: CPT

## 2023-09-01 PROCEDURE — 92507 TX SP LANG VOICE COMM INDIV: CPT

## 2023-09-01 PROCEDURE — 94640 AIRWAY INHALATION TREATMENT: CPT

## 2023-09-01 RX ORDER — METOPROLOL SUCCINATE 50 MG/1
100 TABLET, EXTENDED RELEASE ORAL DAILY
Status: DISCONTINUED | OUTPATIENT
Start: 2023-09-01 | End: 2023-09-01

## 2023-09-01 RX ORDER — DILTIAZEM HYDROCHLORIDE 120 MG/1
120 CAPSULE, COATED, EXTENDED RELEASE ORAL DAILY
Status: DISCONTINUED | OUTPATIENT
Start: 2023-09-01 | End: 2023-09-01 | Stop reason: HOSPADM

## 2023-09-01 RX ORDER — METOPROLOL SUCCINATE 50 MG/1
50 TABLET, EXTENDED RELEASE ORAL 2 TIMES DAILY
Status: DISCONTINUED | OUTPATIENT
Start: 2023-09-01 | End: 2023-09-01 | Stop reason: HOSPADM

## 2023-09-01 RX ORDER — PANTOPRAZOLE SODIUM 40 MG/1
40 TABLET, DELAYED RELEASE ORAL
Status: DISCONTINUED | OUTPATIENT
Start: 2023-09-01 | End: 2023-09-01 | Stop reason: HOSPADM

## 2023-09-01 RX ADMIN — CEFTRIAXONE 1000 MG: 1 INJECTION, POWDER, FOR SOLUTION INTRAMUSCULAR; INTRAVENOUS at 10:24

## 2023-09-01 RX ADMIN — ALBUTEROL SULFATE 2.5 MG: 2.5 SOLUTION RESPIRATORY (INHALATION) at 00:57

## 2023-09-01 RX ADMIN — ASPIRIN 81 MG: 81 TABLET, COATED ORAL at 10:25

## 2023-09-01 RX ADMIN — ENOXAPARIN SODIUM 40 MG: 100 INJECTION SUBCUTANEOUS at 10:24

## 2023-09-01 RX ADMIN — PREDNISONE 40 MG: 20 TABLET ORAL at 10:25

## 2023-09-01 RX ADMIN — METOPROLOL SUCCINATE 50 MG: 50 TABLET, EXTENDED RELEASE ORAL at 14:18

## 2023-09-01 RX ADMIN — SODIUM CHLORIDE, PRESERVATIVE FREE 10 ML: 5 INJECTION INTRAVENOUS at 10:34

## 2023-09-01 RX ADMIN — PANTOPRAZOLE SODIUM 40 MG: 40 TABLET, DELAYED RELEASE ORAL at 14:18

## 2023-09-01 RX ADMIN — DOXYCYCLINE HYCLATE 100 MG: 100 CAPSULE ORAL at 10:25

## 2023-09-01 RX ADMIN — ALBUTEROL SULFATE 2.5 MG: 2.5 SOLUTION RESPIRATORY (INHALATION) at 06:28

## 2023-09-01 RX ADMIN — DILTIAZEM HYDROCHLORIDE 120 MG: 120 CAPSULE, COATED, EXTENDED RELEASE ORAL at 14:18

## 2023-09-01 NOTE — DISCHARGE INSTRUCTIONS
[address] WILL BE IN CONTACT WITH YOU SHORTLY, IF YOU NEED ANYTHING FROM [address], CALL [phone]

## 2023-09-01 NOTE — CARE COORDINATION
capsule  Commonly known as: OMNICEF  Take 1 capsule by mouth 2 times daily for 2 days      doxycycline hyclate 100 MG tablet  Commonly known as: VIBRA-TABS  Take 1 tablet by mouth 2 times daily for 2 days      guaiFENesin-dextromethorphan 100-10 MG/5ML syrup  Commonly known as: ROBITUSSIN DM  Take 5 mLs by mouth 3 times daily as needed for Cough      predniSONE 20 MG tablet  Commonly known as: DELTASONE  Take 2 tablets by mouth daily for 3 doses                CHANGE how you take these medications       albuterol sulfate  (90 Base) MCG/ACT inhaler  Commonly known as: PROVENTIL;VENTOLIN;PROAIR  Inhale 2 puffs into the lungs every 4 hours as needed for Wheezing or Shortness of Breath  What changed:   when to take this  reasons to take this                CONTINUE taking these medications       ASPIRIN 81 PO      dilTIAZem 120 MG extended release capsule  Commonly known as: CARDIZEM CD  Take 1 capsule by mouth daily      esomeprazole 40 MG delayed release capsule  Commonly known as: NEXIUM      hydroCHLOROthiazide 12.5 MG capsule  Commonly known as: MICROZIDE      losartan 100 MG tablet  Commonly known as: COZAAR      montelukast 10 MG tablet  Commonly known as: SINGULAIR      nebivolol 20 MG Tabs tablet  Commonly known as: BYSTOLIC      rOPINIRole 1 MG tablet  Commonly known as: REQUIP      Trelegy Ellipta 100-62.5-25 MCG/ACT Aepb inhaler  Generic drug: fluticasone-umeclidin-vilant                    Where to Get Your Medications          These medications were sent to [place] [address]        Phone: [phone]   albuterol sulfate  (90 Base) MCG/ACT inhaler  cefdinir 300 MG capsule  doxycycline hyclate 100 MG tablet  guaiFENesin-dextromethorphan 100-10 MG/5ML syrup  predniSONE 20 MG tablet            Discharge Instructions: Follow up with ALEJANDRA Cox NP within 1 week.   Take medications as

## 2023-09-01 NOTE — DISCHARGE SUMMARY
Discharge Summary    NAME: Tyrone Mariee  :  1950  MRN:  [id number]    Admit date:  2023  Discharge date:    2023    Advance Directive: Full Code    Primary Care Physician:  ALEJANDRA Bennett NP    Discharge Diagnoses:  Principal Problem:    Community acquired pneumonia, bilateral, due to unspecified organism  Active Problems:    Hypertension    Sepsis ([address])    PATEL (acute kidney injury) ([address])    Hyponatremia    COPD (chronic obstructive pulmonary disease) ([address])    Acute respiratory failure with hypoxia  Dysphagia  Resolved Problems:    * No resolved hospital problems. *      Significant Diagnostic Studies:   XR CHEST PORTABLE    Result Date: 2023    EXAM:  SINGLE FRONTAL VIEW OF THE CHEST  HISTORY: Weakness. COMPARISON:  CT chest 2023  FINDINGS: The cardiomediastinal silhouette is unremarkable. There is no pneumothorax or effusion. There is no consolidation, nodule or mass. The there are bilateral interstitial ground-glass opacities. The osseous structures are unremarkable. Bilateral interstitial and ground-glass opacities in the lungs suggestive of atypical infection. Underlying changes of chronic obstructive pulmonary disease are also present.    ______________________________________ Electronically signed by: Tiffanie Bar M.D. Date:     2023 Time:    09:51       Pertinent Labs:   CBC:   Recent Labs     23  0308 23   WBC 16.1* 21.4* 22.0*   HGB 10.6* 10.5* 10.7*    435* 483*     BMP:    Recent Labs     23  0308 23    138 141   K 3.9 3.7 3.3*    104 106   CO2 16* 21* 23   BUN 30* 27* 20   CREATININE 0.8 0.6 0.6   GLUCOSE 123* 126* 119*     INR: No results for input(s): INR in the last 72 hours. Lipids: No results for input(s): CHOL, HDL in the last 72 hours.     Invalid input(s): LDLCALCU  ABGs:No results for input(s): PHART, QQG9VYD, PO2ART, HNK1LLH, BEART, HGBAE, K4OWFGMV, CARBOXHGBART,

## 2023-09-01 NOTE — PROGRESS NOTES
Cannula  Communication Observation: (Re-assessed patient's speech production. Patient exhibited decreased volume of speech, decreased labial movements, and slow, decreased lingual movements during verbalization. SLP still ranked functional intelligibility of speech for unfamiliar listeners at 100% in utterances with background noise present.)  Follows Directions: Simple   Patient Positioning: Upright in chair  Consistencies Administered: Nectar - straw; Thin - straw     Oral Motor Examination   Labial ROM: (Decreased, bilaterally, during labial retraction trials and labial protrusion trials.)  Labial Strength: (Adequate during labial compression trials.)  Labial Coordination: (Slowed movements were noted.)  Lingual ROM: (Adequate during lingual extension trials with full point achieved; decreased during lingual elevation trials without use of accessory jaw movement; decreased movements noted bilaterally.)  Lingual Strength: (Decreased)  Lingual Coordination: (Slowed movements were noted.)     Re-assessed patient's swallowing function with the following observations noted:     Oral Phase  Suspected Premature Bolus Loss: Thin - straw (Patient exhibited fast oral transit of thin H2O trials presented via straw by SLP.)  Oral Phase - Comment: Oral transit of nectar thick liquid presentations, administered via straw by SLP, primarily measured 1-2 seconds in length. Pharyngeal Phase  Suspected Swallow Delay: Thin - straw (Suspect secondary to oral transit times.)  Laryngeal Elevation: (Patient exhibited sluggish, inconsistently mildly decreased laryngeal elevation for swallow airway protection.)  Delayed Cough: All  Pharyngeal Phase - Comment: It is noted that inconsistent delayed coughs were observed with both nectar thick liquid presentations and thin H2O trials presented during treatment session this date.  However, do not feel that all coughs were related to penetration/aspiration (secondary to timing of swallows and presence of throat movement and patient exhibited coughs at rest). Patient scheduled for videofluoroscopic swallow study later this date for objective evaluation. Results to follow.     Electronically signed by PARISA Stubbs on 9/1/2023 at 12:00 PM

## 2023-09-01 NOTE — PROGRESS NOTES
CLINICAL PHARMACY NOTE: MEDS TO BEDS    Total # of Prescriptions Filled: 4   The following medications were delivered to the patient:  Current Discharge Medication List        START taking these medications    Details   cefdinir (OMNICEF) 300 MG capsule Take 1 capsule by mouth 2 times daily for 2 days  Qty: 4 capsule, Refills: 0      doxycycline hyclate (VIBRA-TABS) 100 MG tablet Take 1 tablet by mouth 2 times daily for 2 days  Qty: 4 tablet, Refills: 0      predniSONE (DELTASONE) 20 MG tablet Take 2 tablets by mouth daily for 3 doses  Qty: 6 tablet, Refills: 0      guaiFENesin-dextromethorphan (ROBITUSSIN DM) 100-10 MG/5ML syrup Take 5 mLs by mouth 3 times daily as needed for Cough  Qty: 120 mL, Refills: 0               Additional Documentation:    Delivered RX's to patient son-in-law in patient room. Paid with card.

## 2023-09-01 NOTE — PLAN OF CARE
Problem: Skin/Tissue Integrity  Goal: Absence of new skin breakdown  Description: 1. Monitor for areas of redness and/or skin breakdown  2. Every 4-6 hours minimum:  Change oxygen saturation probe site  3. Every 4-6 hours:  If on nasal continuous positive airway pressure, respiratory therapy assess nares and determine need for appliance change or resting period.   Outcome: Adequate for Discharge     Problem: Safety - Adult  Goal: Free from fall injury  Outcome: Adequate for Discharge     Problem: ABCDS Injury Assessment  Goal: Absence of physical injury  Outcome: Adequate for Discharge     Problem: Pain  Goal: Verbalizes/displays adequate comfort level or baseline comfort level  Outcome: Adequate for Discharge     Problem: Nutrition Deficit:  Goal: Optimize nutritional status  Outcome: Adequate for Discharge

## 2023-09-01 NOTE — CARE COORDINATION
Spoke with patient regarding MD orders for [facility] services. Patient agreeable and has chosen [facility]. Referral Faxed. [id number] [facility] [phone]. [phone]. Please notify [id number] [facility] when patient discharges and fax DC Summary,  DC med list and any new [facility] orders. The Patient and/or patient representative was provided with a choice of provider and agrees   with the discharge plan. [x] Yes [] No    Freedom of choice list was provided with basic dialogue that supports the patient's individualized plan of care/goals, treatment preferences and shares the quality data associated with the providers.  [x] Yes [] No  Electronically signed by Tyson Whipple on 9/1/2023 at 10:52 AM

## 2023-09-01 NOTE — PROCEDURES
INSTRUMENTAL SWALLOW REPORT  MODIFIED BARIUM SWALLOW    NAME: Tyrone Mariee   : 54  MRN: [id number]       Date of Eval: 23       Referring Diagnosis(es): Dysphagia     Past Medical History:  has a past medical history of GERD (gastroesophageal reflux disease) and Hypertension. Past Surgical History:  has a past surgical history that includes Gallbladder surgery; Hysterectomy, total abdominal; Tonsillectomy and adenoidectomy; Carpal tunnel release (Right); and Ovary removal.    Patient Complaints/Reason for Referral:  Tyrone Mariee was referred for a MBS to assess the efficiency of his/her swallow function, assess for aspiration, and to make recommendations regarding safe dietary consistencies, effective compensatory strategies, and safe eating environment. Impressions:  Completed assessment. Patient exhibited premature spillage with all food/drink consistencies. Epiglottic inversion during swallow initiation was considered to be delayed; penetration was noted, without detection, with thin barium trials. No aspiration was observed with any food/drink consistency. Patient exhibited min oral cavity residue with mildly thick/nectar thick barium and thin barium post swallows; all residue cleared with additional dry swallows. Patient exhibited moderate oral cavity residue and moderate base of tongue residue with regular solid consistency; residue cleared with barium washes. At this time, as suspect quick onset of fatigue, would recommend continuation easy to chew consistency with mildly thick/nectar thick barium. Meds crushed in pudding/applesauce. If patient receives mouth care prior to intake, okay for ice chips and sips regular water IN BETWEEN MEALS for comfort. Recommend  continued speech therapy, upon D/C from acute hospital setting, for dysphagia. Patient Position: Lateral and Patient Degrees: Patient sitting 90 degrees in MBS chair.      Consistencies Administered: Mildly thick/Nectar -

## 2023-09-01 NOTE — PROGRESS NOTES
Physical Therapy     09/01/23 0900   Restrictions/Precautions   Restrictions/Precautions Fall Risk   General   Other (Comment) pt requires moderate assistance for hygiene/clothing management at [facility]   Subjective   Subjective Pt agreed to therapy. pt found partially out of chair with nasal cannula pulled out in an attempt to self transfer. nurse notified of pt attempt   Subjective   Pain none   Transfers   Sit to Stand Contact guard assistance   Stand to Sit Contact guard assistance   Bed to Chair Contact guard assistance   Comment multiple STS from chair<>RW with notable all over unsteadiness initially that slightly improved with cuing. short distance transfer from chair to [facility] with RW CGA/min for assistance with correct position/advancement of AD needed. Ambulation   Device Rolling Walker   Assistance Contact guard assistance;Minimal assistance   Quality of Gait slow but steady after cuing for posture and bilat knee extension before transfer/ SOA easily   Gait Deviations Slow [facility];Decreased step length;Decreased step height   Distance 4'   Comments from chair to [facility] and back   Short Term Goals   Time Frame for Short Term Goals 2 wks   Short Term Goal 1 supine to sit indep   Short Term Goal 2 sit to stand indep   Short Term Goal 3 amb. 100' with RW SBA   Short Term Goal 4 bed to chair SBA   Activity Tolerance   Activity Tolerance Patient limited by endurance; Patient limited by fatigue   Assessment   Assessment pt unable to AMB further at this time due to notable SOA with increased activity. improved steadiness at RW after v. cuing for increased thoracic/bilat knee extension with no true LOB during 4' transfer from chair to [facility]. Nursing notified of pt attempt to self transfer with chair alarm placed back on pt and door left open for monitoring. will continue to progress as able. PT Plan of Care   Friday X   Safety Devices   Type of Devices Left in chair;Nurse notified;Gait belt; Chair alarm in place;Call light within reach     Electronically signed by Janki Mendieta PTA on 9/1/2023 at 9:22 AM

## 2023-09-07 ENCOUNTER — TELEPHONE (OUTPATIENT)
Dept: CARDIOLOGY CLINIC | Age: 73
End: 2023-09-07

## 2023-09-07 NOTE — TELEPHONE ENCOUNTER
Etienne Maynard MD  You 11 minutes ago (2:27 PM)       Have never seen patient. Being followed by nurse practitioners. Please refer back to them or schedule appointment with them.

## 2023-09-07 NOTE — TELEPHONE ENCOUNTER
Pt PCP called today stating pt BP was 86/54 in office they wanted to know if you wanted to change some of her BP meds.  Pls call the dr. Geoff Ramos NP

## 2023-09-08 ENCOUNTER — APPOINTMENT (OUTPATIENT)
Dept: GENERAL RADIOLOGY | Age: 73
DRG: 193 | End: 2023-09-08
Payer: MEDICARE

## 2023-09-08 ENCOUNTER — OFFICE VISIT (OUTPATIENT)
Dept: CARDIOLOGY CLINIC | Age: 73
End: 2023-09-08
Payer: MEDICARE

## 2023-09-08 ENCOUNTER — HOSPITAL ENCOUNTER (INPATIENT)
Age: 73
LOS: 11 days | Discharge: HOME HEALTH | DRG: 193 | End: 2023-09-19
Attending: EMERGENCY MEDICINE
Payer: MEDICARE

## 2023-09-08 VITALS
BODY MASS INDEX: 29.07 KG/M2 | HEIGHT: 61 IN | DIASTOLIC BLOOD PRESSURE: 84 MMHG | HEART RATE: 64 BPM | WEIGHT: 154 LBS | SYSTOLIC BLOOD PRESSURE: 118 MMHG | OXYGEN SATURATION: 98 %

## 2023-09-08 DIAGNOSIS — E83.42 HYPOMAGNESEMIA: ICD-10-CM

## 2023-09-08 DIAGNOSIS — Z82.49 FAMILY HISTORY OF CORONARY ARTERY DISEASE: ICD-10-CM

## 2023-09-08 DIAGNOSIS — R06.09 DOE (DYSPNEA ON EXERTION): ICD-10-CM

## 2023-09-08 DIAGNOSIS — R63.8 DECREASED ORAL INTAKE: ICD-10-CM

## 2023-09-08 DIAGNOSIS — Z87.01 HISTORY OF RECENT PNEUMONIA: ICD-10-CM

## 2023-09-08 DIAGNOSIS — R53.83 OTHER FATIGUE: ICD-10-CM

## 2023-09-08 DIAGNOSIS — J18.9 PNEUMONIA: ICD-10-CM

## 2023-09-08 DIAGNOSIS — I10 ESSENTIAL HYPERTENSION: Primary | ICD-10-CM

## 2023-09-08 DIAGNOSIS — E87.6 HYPOKALEMIA: Primary | ICD-10-CM

## 2023-09-08 PROBLEM — R65.10 SIRS (SYSTEMIC INFLAMMATORY RESPONSE SYNDROME) (HCC): Status: ACTIVE | Noted: 2023-09-08

## 2023-09-08 LAB
ALBUMIN SERPL-MCNC: 2 G/DL (ref 3.5–5.2)
ALP SERPL-CCNC: 80 U/L (ref 35–104)
ALT SERPL-CCNC: 13 U/L (ref 5–33)
ANION GAP SERPL CALCULATED.3IONS-SCNC: 13 MMOL/L (ref 7–19)
AST SERPL-CCNC: 25 U/L (ref 5–32)
B PARAP IS1001 DNA NPH QL NAA+NON-PROBE: NOT DETECTED
B PERT.PT PRMT NPH QL NAA+NON-PROBE: NOT DETECTED
BASOPHILS # BLD: 0 K/UL (ref 0–0.2)
BASOPHILS NFR BLD: 0.2 % (ref 0–1)
BILIRUB SERPL-MCNC: 0.7 MG/DL (ref 0.2–1.2)
BUN SERPL-MCNC: 11 MG/DL (ref 8–23)
C PNEUM DNA NPH QL NAA+NON-PROBE: NOT DETECTED
CALCIUM SERPL-MCNC: 7.3 MG/DL (ref 8.8–10.2)
CHLORIDE SERPL-SCNC: 90 MMOL/L (ref 98–111)
CO2 SERPL-SCNC: 33 MMOL/L (ref 22–29)
CREAT SERPL-MCNC: 1 MG/DL (ref 0.5–0.9)
EOSINOPHIL # BLD: 0.1 K/UL (ref 0–0.6)
EOSINOPHIL NFR BLD: 0.5 % (ref 0–5)
ERYTHROCYTE [DISTWIDTH] IN BLOOD BY AUTOMATED COUNT: 15.4 % (ref 11.5–14.5)
FLUAV RNA NPH QL NAA+NON-PROBE: NOT DETECTED
FLUBV RNA NPH QL NAA+NON-PROBE: NOT DETECTED
GLUCOSE SERPL-MCNC: 123 MG/DL (ref 74–109)
HADV DNA NPH QL NAA+NON-PROBE: NOT DETECTED
HCOV 229E RNA NPH QL NAA+NON-PROBE: NOT DETECTED
HCOV HKU1 RNA NPH QL NAA+NON-PROBE: NOT DETECTED
HCOV NL63 RNA NPH QL NAA+NON-PROBE: NOT DETECTED
HCOV OC43 RNA NPH QL NAA+NON-PROBE: NOT DETECTED
HCT VFR BLD AUTO: 31.9 % (ref 37–47)
HGB BLD-MCNC: 10.9 G/DL (ref 12–16)
HMPV RNA NPH QL NAA+NON-PROBE: NOT DETECTED
HPIV1 RNA NPH QL NAA+NON-PROBE: NOT DETECTED
HPIV2 RNA NPH QL NAA+NON-PROBE: NOT DETECTED
HPIV3 RNA NPH QL NAA+NON-PROBE: NOT DETECTED
HPIV4 RNA NPH QL NAA+NON-PROBE: NOT DETECTED
IMM GRANULOCYTES # BLD: 0.2 K/UL
LACTATE BLDV-SCNC: 1.6 MMOL/L (ref 0.5–1.9)
LYMPHOCYTES # BLD: 1.4 K/UL (ref 1.1–4.5)
LYMPHOCYTES NFR BLD: 8 % (ref 20–40)
M PNEUMO DNA NPH QL NAA+NON-PROBE: NOT DETECTED
MAGNESIUM SERPL-MCNC: 1.1 MG/DL (ref 1.6–2.4)
MCH RBC QN AUTO: 32.7 PG (ref 27–31)
MCHC RBC AUTO-ENTMCNC: 34.2 G/DL (ref 33–37)
MCV RBC AUTO: 95.8 FL (ref 81–99)
MONOCYTES # BLD: 1.1 K/UL (ref 0–0.9)
MONOCYTES NFR BLD: 6.3 % (ref 0–10)
NEUTROPHILS # BLD: 14.4 K/UL (ref 1.5–7.5)
NEUTS SEG NFR BLD: 83.7 % (ref 50–65)
PHOSPHATE SERPL-MCNC: 2.3 MG/DL (ref 2.5–4.5)
PLATELET # BLD AUTO: 522 K/UL (ref 130–400)
PMV BLD AUTO: 9.9 FL (ref 9.4–12.3)
POTASSIUM SERPL-SCNC: 2.2 MMOL/L (ref 3.5–5)
PROCALCITONIN: 0.28 NG/ML (ref 0–0.09)
PROT SERPL-MCNC: 5.3 G/DL (ref 6.6–8.7)
RBC # BLD AUTO: 3.33 M/UL (ref 4.2–5.4)
REASON FOR REJECTION: NORMAL
REJECTED TEST: NORMAL
RSV RNA NPH QL NAA+NON-PROBE: NOT DETECTED
RV+EV RNA NPH QL NAA+NON-PROBE: NOT DETECTED
SARS-COV-2 RNA NPH QL NAA+NON-PROBE: NOT DETECTED
SODIUM SERPL-SCNC: 136 MMOL/L (ref 136–145)
WBC # BLD AUTO: 17.2 K/UL (ref 4.8–10.8)

## 2023-09-08 PROCEDURE — 93000 ELECTROCARDIOGRAM COMPLETE: CPT | Performed by: CLINICAL NURSE SPECIALIST

## 2023-09-08 PROCEDURE — 0202U NFCT DS 22 TRGT SARS-COV-2: CPT

## 2023-09-08 PROCEDURE — 6370000000 HC RX 637 (ALT 250 FOR IP)

## 2023-09-08 PROCEDURE — 99214 OFFICE O/P EST MOD 30 MIN: CPT | Performed by: CLINICAL NURSE SPECIALIST

## 2023-09-08 PROCEDURE — 99285 EMERGENCY DEPT VISIT HI MDM: CPT

## 2023-09-08 PROCEDURE — 71045 X-RAY EXAM CHEST 1 VIEW: CPT

## 2023-09-08 PROCEDURE — 85025 COMPLETE CBC W/AUTO DIFF WBC: CPT

## 2023-09-08 PROCEDURE — 3074F SYST BP LT 130 MM HG: CPT | Performed by: CLINICAL NURSE SPECIALIST

## 2023-09-08 PROCEDURE — 6360000002 HC RX W HCPCS: Performed by: INTERNAL MEDICINE

## 2023-09-08 PROCEDURE — 2580000003 HC RX 258: Performed by: EMERGENCY MEDICINE

## 2023-09-08 PROCEDURE — 6360000002 HC RX W HCPCS

## 2023-09-08 PROCEDURE — 80053 COMPREHEN METABOLIC PANEL: CPT

## 2023-09-08 PROCEDURE — 6360000002 HC RX W HCPCS: Performed by: EMERGENCY MEDICINE

## 2023-09-08 PROCEDURE — 83605 ASSAY OF LACTIC ACID: CPT

## 2023-09-08 PROCEDURE — 94150 VITAL CAPACITY TEST: CPT

## 2023-09-08 PROCEDURE — 6370000000 HC RX 637 (ALT 250 FOR IP): Performed by: EMERGENCY MEDICINE

## 2023-09-08 PROCEDURE — 3079F DIAST BP 80-89 MM HG: CPT | Performed by: CLINICAL NURSE SPECIALIST

## 2023-09-08 PROCEDURE — 2580000003 HC RX 258

## 2023-09-08 PROCEDURE — 36415 COLL VENOUS BLD VENIPUNCTURE: CPT

## 2023-09-08 PROCEDURE — 2140000000 HC CCU INTERMEDIATE R&B

## 2023-09-08 PROCEDURE — 83735 ASSAY OF MAGNESIUM: CPT

## 2023-09-08 PROCEDURE — 2700000000 HC OXYGEN THERAPY PER DAY

## 2023-09-08 PROCEDURE — 84145 PROCALCITONIN (PCT): CPT

## 2023-09-08 PROCEDURE — 84100 ASSAY OF PHOSPHORUS: CPT

## 2023-09-08 PROCEDURE — 87040 BLOOD CULTURE FOR BACTERIA: CPT

## 2023-09-08 PROCEDURE — 1123F ACP DISCUSS/DSCN MKR DOCD: CPT | Performed by: CLINICAL NURSE SPECIALIST

## 2023-09-08 RX ORDER — METOPROLOL SUCCINATE 50 MG/1
200 TABLET, EXTENDED RELEASE ORAL DAILY
Status: DISCONTINUED | OUTPATIENT
Start: 2023-09-09 | End: 2023-09-19 | Stop reason: HOSPADM

## 2023-09-08 RX ORDER — POTASSIUM CHLORIDE 7.45 MG/ML
10 INJECTION INTRAVENOUS PRN
Status: DISCONTINUED | OUTPATIENT
Start: 2023-09-08 | End: 2023-09-19 | Stop reason: HOSPADM

## 2023-09-08 RX ORDER — ASPIRIN 81 MG/1
81 TABLET, CHEWABLE ORAL DAILY
Status: DISCONTINUED | OUTPATIENT
Start: 2023-09-09 | End: 2023-09-19 | Stop reason: HOSPADM

## 2023-09-08 RX ORDER — SODIUM CHLORIDE, SODIUM LACTATE, POTASSIUM CHLORIDE, AND CALCIUM CHLORIDE .6; .31; .03; .02 G/100ML; G/100ML; G/100ML; G/100ML
1000 INJECTION, SOLUTION INTRAVENOUS ONCE
Status: COMPLETED | OUTPATIENT
Start: 2023-09-08 | End: 2023-09-08

## 2023-09-08 RX ORDER — POTASSIUM CHLORIDE 7.45 MG/ML
10 INJECTION INTRAVENOUS
Status: COMPLETED | OUTPATIENT
Start: 2023-09-08 | End: 2023-09-08

## 2023-09-08 RX ORDER — POTASSIUM CHLORIDE 20 MEQ/1
40 TABLET, EXTENDED RELEASE ORAL PRN
Status: DISCONTINUED | OUTPATIENT
Start: 2023-09-08 | End: 2023-09-19 | Stop reason: HOSPADM

## 2023-09-08 RX ORDER — MEGESTROL ACETATE 40 MG/ML
SUSPENSION ORAL
Status: ON HOLD | COMMUNITY
Start: 2023-09-07

## 2023-09-08 RX ORDER — SODIUM CHLORIDE 0.9 % (FLUSH) 0.9 %
5-40 SYRINGE (ML) INJECTION PRN
Status: DISCONTINUED | OUTPATIENT
Start: 2023-09-08 | End: 2023-09-19 | Stop reason: HOSPADM

## 2023-09-08 RX ORDER — 0.9 % SODIUM CHLORIDE 0.9 %
1000 INTRAVENOUS SOLUTION INTRAVENOUS ONCE
Status: COMPLETED | OUTPATIENT
Start: 2023-09-08 | End: 2023-09-08

## 2023-09-08 RX ORDER — MAGNESIUM SULFATE IN WATER 40 MG/ML
2000 INJECTION, SOLUTION INTRAVENOUS PRN
Status: DISCONTINUED | OUTPATIENT
Start: 2023-09-08 | End: 2023-09-19 | Stop reason: HOSPADM

## 2023-09-08 RX ORDER — POTASSIUM CHLORIDE 20 MEQ/1
40 TABLET, EXTENDED RELEASE ORAL ONCE
Status: COMPLETED | OUTPATIENT
Start: 2023-09-08 | End: 2023-09-08

## 2023-09-08 RX ORDER — GUAIFENESIN/DEXTROMETHORPHAN 100-10MG/5
5 SYRUP ORAL 3 TIMES DAILY PRN
Status: DISCONTINUED | OUTPATIENT
Start: 2023-09-08 | End: 2023-09-19 | Stop reason: HOSPADM

## 2023-09-08 RX ORDER — PANTOPRAZOLE SODIUM 40 MG/1
40 TABLET, DELAYED RELEASE ORAL
Status: DISCONTINUED | OUTPATIENT
Start: 2023-09-09 | End: 2023-09-19 | Stop reason: HOSPADM

## 2023-09-08 RX ORDER — SODIUM CHLORIDE 0.9 % (FLUSH) 0.9 %
5-40 SYRINGE (ML) INJECTION EVERY 12 HOURS SCHEDULED
Status: DISCONTINUED | OUTPATIENT
Start: 2023-09-08 | End: 2023-09-19 | Stop reason: HOSPADM

## 2023-09-08 RX ORDER — MAGNESIUM SULFATE IN WATER 40 MG/ML
2000 INJECTION, SOLUTION INTRAVENOUS ONCE
Status: COMPLETED | OUTPATIENT
Start: 2023-09-08 | End: 2023-09-09

## 2023-09-08 RX ORDER — MONTELUKAST SODIUM 10 MG/1
10 TABLET ORAL NIGHTLY
Status: DISCONTINUED | OUTPATIENT
Start: 2023-09-08 | End: 2023-09-19 | Stop reason: HOSPADM

## 2023-09-08 RX ORDER — SODIUM CHLORIDE AND POTASSIUM CHLORIDE 150; 900 MG/100ML; MG/100ML
INJECTION, SOLUTION INTRAVENOUS CONTINUOUS
Status: DISCONTINUED | OUTPATIENT
Start: 2023-09-08 | End: 2023-09-10

## 2023-09-08 RX ORDER — ACETAMINOPHEN 650 MG/1
650 SUPPOSITORY RECTAL EVERY 6 HOURS PRN
Status: DISCONTINUED | OUTPATIENT
Start: 2023-09-08 | End: 2023-09-19 | Stop reason: HOSPADM

## 2023-09-08 RX ORDER — ENOXAPARIN SODIUM 100 MG/ML
40 INJECTION SUBCUTANEOUS DAILY
Status: DISCONTINUED | OUTPATIENT
Start: 2023-09-09 | End: 2023-09-19 | Stop reason: HOSPADM

## 2023-09-08 RX ORDER — MAGNESIUM SULFATE IN WATER 40 MG/ML
2000 INJECTION, SOLUTION INTRAVENOUS ONCE
Status: DISCONTINUED | OUTPATIENT
Start: 2023-09-08 | End: 2023-09-09

## 2023-09-08 RX ORDER — LEVOFLOXACIN 5 MG/ML
250 INJECTION, SOLUTION INTRAVENOUS EVERY 24 HOURS
Status: DISCONTINUED | OUTPATIENT
Start: 2023-09-08 | End: 2023-09-08 | Stop reason: DRUGHIGH

## 2023-09-08 RX ORDER — SODIUM CHLORIDE 9 MG/ML
INJECTION, SOLUTION INTRAVENOUS PRN
Status: DISCONTINUED | OUTPATIENT
Start: 2023-09-08 | End: 2023-09-19 | Stop reason: HOSPADM

## 2023-09-08 RX ORDER — LEVOFLOXACIN 5 MG/ML
750 INJECTION, SOLUTION INTRAVENOUS
Status: DISCONTINUED | OUTPATIENT
Start: 2023-09-08 | End: 2023-09-09

## 2023-09-08 RX ORDER — ACETAMINOPHEN 325 MG/1
650 TABLET ORAL EVERY 6 HOURS PRN
Status: DISCONTINUED | OUTPATIENT
Start: 2023-09-08 | End: 2023-09-19 | Stop reason: HOSPADM

## 2023-09-08 RX ADMIN — MAGNESIUM SULFATE HEPTAHYDRATE 2000 MG: 2 INJECTION, SOLUTION INTRAVENOUS at 22:14

## 2023-09-08 RX ADMIN — POTASSIUM CHLORIDE 10 MEQ: 10 INJECTION, SOLUTION INTRAVENOUS at 21:12

## 2023-09-08 RX ADMIN — POTASSIUM CHLORIDE AND SODIUM CHLORIDE: 900; 150 INJECTION, SOLUTION INTRAVENOUS at 22:11

## 2023-09-08 RX ADMIN — POTASSIUM CHLORIDE AND SODIUM CHLORIDE: 900; 150 INJECTION, SOLUTION INTRAVENOUS at 18:03

## 2023-09-08 RX ADMIN — SODIUM CHLORIDE, POTASSIUM CHLORIDE, SODIUM LACTATE AND CALCIUM CHLORIDE 1000 ML: 600; 310; 30; 20 INJECTION, SOLUTION INTRAVENOUS at 18:06

## 2023-09-08 RX ADMIN — POTASSIUM CHLORIDE 40 MEQ: 1500 TABLET, EXTENDED RELEASE ORAL at 20:59

## 2023-09-08 RX ADMIN — SODIUM CHLORIDE 1000 ML: 9 INJECTION, SOLUTION INTRAVENOUS at 21:08

## 2023-09-08 RX ADMIN — MONTELUKAST 10 MG: 10 TABLET, FILM COATED ORAL at 22:56

## 2023-09-08 RX ADMIN — POTASSIUM CHLORIDE 40 MEQ: 1500 TABLET, EXTENDED RELEASE ORAL at 16:31

## 2023-09-08 RX ADMIN — LEVOFLOXACIN 750 MG: 5 INJECTION, SOLUTION INTRAVENOUS at 20:58

## 2023-09-08 ASSESSMENT — PAIN SCALES - GENERAL: PAINLEVEL_OUTOF10: 0

## 2023-09-08 ASSESSMENT — ENCOUNTER SYMPTOMS
GASTROINTESTINAL NEGATIVE: 1
EYES NEGATIVE: 1
SHORTNESS OF BREATH: 1
NAUSEA: 1
COUGH: 1

## 2023-09-08 NOTE — PROGRESS NOTES
[address]  [address]  [address]  Phone: [phone]  Fax: [phone]    OFFICE VISIT:  2023    Dandre Todd - : 1950    Reason For Visit:  Kumar Gonzales is a 72 y.o. female who is here for Follow-up (Hypotension fu   pt has edema)  Was seen in evaluation as a new patient 2023. History of hypertension, hyperlipidemia, COPD, GERD and restless leg syndrome. Amlodipine 5 mg was added to her HCTZ, losartan and Bystolic. Developed swelling with amlodipine and was switched to Cardizem 120 mg daily    Patient was admitted to the hospital 2023 with community-acquired pneumonia bilateral.  Was treated with antibiotics and steroids. Also had acute kidney injury with poor p.o. intake. She was given fluids. She returns today in follow-up. She is accompanied with her family. Family are states her blood pressure was dropping very low with the diltiazem. Did not take it yesterday or today. She currently has home health. She was sent home with oxygen on 2 L. This is new for her so hopes to come off. Oxygen saturations have been staying above 95%    Quit smoking prior to her hospitalization. She has not restarted. She did continues to have productive cough but not febrile  Appetite has been decreased. Has thrush and on medication. Using her inhalers regularly. Still has pedal edema        Subjective  Kumar Gonzales denies exertional chest pain, , paroxysmal nocturnal dyspnea, syncope, presyncope, arrhythmia, . The patient denies numbness or weakness to suggest cerebrovascular accident or transient ischemic attack. ALEJANDRA Yanez NP is PCP and follows labs.   Dandre Todd has the following history as recorded in [facility]:    Patient Active Problem List    Diagnosis Date Noted    Community acquired pneumonia, bilateral 2023    Hypertension 2023    Sepsis ([address]) 2023    PATEL (acute kidney injury) ([address]) 2023    Hyponatremia 2023    COPD

## 2023-09-08 NOTE — H&P
Wendy - History & Physical      PCP: ALEJANDRA De Leon NP    Date of Admission: 9/8/2023    Date of Service: 9/8/2023    Chief Complaint: Abnormal lab    History Of Present Illness: The patient is a 72 y.o. female who presented to [facility] ER with PMH COPD, HTN, smoker, restless leg, hyperlipidemia, complaining of abnormal lab. Patient was recently admitted to the hospital on 8/27 due to acute hypoxic respiratory failure due to pneumonia and COPD exacerbation. In addition found to have PATEL due to poor intake. She was given IV antibiotics, IVF and steroids. Condition improved, was discharged home in stable condition home with high level PT and supplemental oxygen continuously 2 LNC 9/1. Patient states since discharge she has remained having decreased appetite and oral intake, generalized weakness and fatigue. She has now had productive yellow sputum and dyspnea on exertion. She notes compliance with medication regimen along with continuous oxygen therapy. She presented to her PCP yesterday for reevaluation of her labs. She was notified today of low potassium and instructed to [facility] ER for further evaluation. States that she has had lightheadedness upon position change, and worsening bilateral lower extremity edema. Of note stated that she had been having a rash to the upper chest after IV antibiotic initiated from prior admission. Received Azithromycin & Rocephin. She states the area did itch. She denies fever, chills, vomiting, abdominal pain, and chest pain. She denies syncopal event. Work-up in ER CXR worsening bilateral upper lobe infiltrates, WBC 17.2, Hgb 10.9, HCT 31.9, K2.2, creatinine 1 BUN 11, mag 1.1, procalcitonin 0.28. Patient is to be admitted to the hospitalist service.   Past Medical History:        Diagnosis Date    GERD (gastroesophageal reflux disease)     Hypertension        Past Surgical History:        Procedure Laterality Date    CARPAL TUNNEL

## 2023-09-08 NOTE — PROGRESS NOTES
Pharmacy Adjustment per [facility] protocol    Aviva England is a 72 y.o. female. Pharmacy has adjusted medications per [facility] protocol. Recent Labs     09/08/23  1631   BUN 11       Recent Labs     09/08/23  1631   CREATININE 1.0*       Estimated Creatinine Clearance: 46 mL/min (A) (based on SCr of 1 mg/dL (H)). Height:   Ht Readings from Last 1 Encounters:   09/08/23 5' 1\" (1.549 m)     Weight:  Wt Readings from Last 1 Encounters:   09/08/23 154 lb (69.9 kg)         Plan: Adjust the following medications based on [facility] protocol:           Change Levaquin to 750 mg IV Q 48 hours.     Electronically signed by Lyndsay Johnson [facility] on 9/8/2023 at 6:44 PM

## 2023-09-08 NOTE — PATIENT INSTRUCTIONS
Stay off the diltiazem for now  Maintain good blood pressure control-goal<130/80 at rest  Maintain good cholesterol control LDL goal<70 with arterial disease  If you are diabetic work to keep/obtain hemoglobin A1c< 7    Stay smoke free!!! Continue the benadryl     Follow up in 1 mos after stress test   Call with any questions or concerns  Follow up with ALEJANDRA Lazo - NP for non cardiac problems  Report any new problems  Cardiovascular Fitness-Exercise as tolerated. Strive for 30 minutes of exercise most days of the week. Cardiac / Healthy Diet- Avoid processed high fat foods, maintain low sodium/salt   Continue current medications as directed  Continue plan of treatment  It is always recommended that you bring your medications bottles with you to each visit - this is for your safety!

## 2023-09-09 ENCOUNTER — APPOINTMENT (OUTPATIENT)
Dept: CT IMAGING | Age: 73
DRG: 193 | End: 2023-09-09
Payer: MEDICARE

## 2023-09-09 PROBLEM — J98.4 CAVITARY PNEUMONIA: Status: ACTIVE | Noted: 2023-08-27

## 2023-09-09 LAB
ANION GAP SERPL CALCULATED.3IONS-SCNC: 14 MMOL/L (ref 7–19)
BACTERIA URNS QL MICRO: NEGATIVE /HPF
BASOPHILS # BLD: 0 K/UL (ref 0–0.2)
BASOPHILS NFR BLD: 0.2 % (ref 0–1)
BILIRUB UR QL STRIP: NEGATIVE
BNP BLD-MCNC: 2733 PG/ML (ref 0–124)
BUN SERPL-MCNC: 8 MG/DL (ref 8–23)
CALCIUM SERPL-MCNC: 7 MG/DL (ref 8.8–10.2)
CALCIUM SERPL-MCNC: 7.5 MG/DL (ref 8.8–10.2)
CHLORIDE SERPL-SCNC: 97 MMOL/L (ref 98–111)
CLARITY UR: CLEAR
CO2 SERPL-SCNC: 26 MMOL/L (ref 22–29)
COLOR UR: YELLOW
CREAT SERPL-MCNC: 0.9 MG/DL (ref 0.5–0.9)
CREAT UR-MCNC: 30.6 MG/DL (ref 28–217)
CRYSTALS URNS MICRO: ABNORMAL /HPF
EOSINOPHIL # BLD: 0.1 K/UL (ref 0–0.6)
EOSINOPHIL NFR BLD: 0.3 % (ref 0–5)
EPI CELLS #/AREA URNS AUTO: 2 /HPF (ref 0–5)
ERYTHROCYTE [DISTWIDTH] IN BLOOD BY AUTOMATED COUNT: 15 % (ref 11.5–14.5)
GLUCOSE SERPL-MCNC: 86 MG/DL (ref 74–109)
GLUCOSE UR STRIP.AUTO-MCNC: NEGATIVE MG/DL
HCT VFR BLD AUTO: 26 % (ref 37–47)
HGB BLD-MCNC: 9.1 G/DL (ref 12–16)
HGB UR STRIP.AUTO-MCNC: ABNORMAL MG/L
HYALINE CASTS #/AREA URNS AUTO: 1 /HPF (ref 0–8)
IMM GRANULOCYTES # BLD: 0.2 K/UL
KETONES UR STRIP.AUTO-MCNC: ABNORMAL MG/DL
LEGIONELLA AG UR QL: NORMAL
LEUKOCYTE ESTERASE UR QL STRIP.AUTO: NEGATIVE
LYMPHOCYTES # BLD: 0.8 K/UL (ref 1.1–4.5)
LYMPHOCYTES NFR BLD: 4 % (ref 20–40)
MAGNESIUM SERPL-MCNC: 1.6 MG/DL (ref 1.6–2.4)
MAGNESIUM SERPL-MCNC: 2.2 MG/DL (ref 1.6–2.4)
MCH RBC QN AUTO: 33.2 PG (ref 27–31)
MCHC RBC AUTO-ENTMCNC: 35 G/DL (ref 33–37)
MCV RBC AUTO: 94.9 FL (ref 81–99)
MONOCYTES # BLD: 1.2 K/UL (ref 0–0.9)
MONOCYTES NFR BLD: 5.8 % (ref 0–10)
NEUTROPHILS # BLD: 17.6 K/UL (ref 1.5–7.5)
NEUTS SEG NFR BLD: 88.6 % (ref 50–65)
NITRITE UR QL STRIP.AUTO: NEGATIVE
PH UR STRIP.AUTO: 7.5 [PH] (ref 5–8)
PHOSPHATE SERPL-MCNC: 2.1 MG/DL (ref 2.5–4.5)
PHOSPHATE SERPL-MCNC: 2.3 MG/DL (ref 2.5–4.5)
PLATELET # BLD AUTO: 507 K/UL (ref 130–400)
PMV BLD AUTO: 9.8 FL (ref 9.4–12.3)
POTASSIUM SERPL-SCNC: 2.4 MMOL/L (ref 3.5–5)
POTASSIUM SERPL-SCNC: 3.2 MMOL/L (ref 3.5–5)
POTASSIUM SERPL-SCNC: 3.3 MMOL/L (ref 3.5–5)
PROT UR STRIP.AUTO-MCNC: NEGATIVE MG/DL
RBC # BLD AUTO: 2.74 M/UL (ref 4.2–5.4)
RBC #/AREA URNS AUTO: 3 /HPF (ref 0–4)
S PNEUM AG SPEC QL: NORMAL
SODIUM SERPL-SCNC: 137 MMOL/L (ref 136–145)
SODIUM UR-SCNC: 28 MMOL/L
SP GR UR STRIP.AUTO: 1 (ref 1–1.03)
UROBILINOGEN UR STRIP.AUTO-MCNC: 1 E.U./DL
UUN UR-MCNC: 181 MG/DL
WBC # BLD AUTO: 19.9 K/UL (ref 4.8–10.8)
WBC #/AREA URNS AUTO: 1 /HPF (ref 0–5)

## 2023-09-09 PROCEDURE — 87449 NOS EACH ORGANISM AG IA: CPT

## 2023-09-09 PROCEDURE — 6360000002 HC RX W HCPCS: Performed by: INTERNAL MEDICINE

## 2023-09-09 PROCEDURE — 85025 COMPLETE CBC W/AUTO DIFF WBC: CPT

## 2023-09-09 PROCEDURE — 6370000000 HC RX 637 (ALT 250 FOR IP)

## 2023-09-09 PROCEDURE — 6360000002 HC RX W HCPCS: Performed by: STUDENT IN AN ORGANIZED HEALTH CARE EDUCATION/TRAINING PROGRAM

## 2023-09-09 PROCEDURE — 2500000003 HC RX 250 WO HCPCS: Performed by: STUDENT IN AN ORGANIZED HEALTH CARE EDUCATION/TRAINING PROGRAM

## 2023-09-09 PROCEDURE — 6370000000 HC RX 637 (ALT 250 FOR IP): Performed by: STUDENT IN AN ORGANIZED HEALTH CARE EDUCATION/TRAINING PROGRAM

## 2023-09-09 PROCEDURE — 2580000003 HC RX 258: Performed by: HOSPITALIST

## 2023-09-09 PROCEDURE — 84132 ASSAY OF SERUM POTASSIUM: CPT

## 2023-09-09 PROCEDURE — 84540 ASSAY OF URINE/UREA-N: CPT

## 2023-09-09 PROCEDURE — 2580000003 HC RX 258: Performed by: STUDENT IN AN ORGANIZED HEALTH CARE EDUCATION/TRAINING PROGRAM

## 2023-09-09 PROCEDURE — 82310 ASSAY OF CALCIUM: CPT

## 2023-09-09 PROCEDURE — 80048 BASIC METABOLIC PNL TOTAL CA: CPT

## 2023-09-09 PROCEDURE — 2500000003 HC RX 250 WO HCPCS: Performed by: HOSPITALIST

## 2023-09-09 PROCEDURE — 83735 ASSAY OF MAGNESIUM: CPT

## 2023-09-09 PROCEDURE — 6360000002 HC RX W HCPCS

## 2023-09-09 PROCEDURE — 82570 ASSAY OF URINE CREATININE: CPT

## 2023-09-09 PROCEDURE — 81001 URINALYSIS AUTO W/SCOPE: CPT

## 2023-09-09 PROCEDURE — 6370000000 HC RX 637 (ALT 250 FOR IP): Performed by: INTERNAL MEDICINE

## 2023-09-09 PROCEDURE — 84300 ASSAY OF URINE SODIUM: CPT

## 2023-09-09 PROCEDURE — 94760 N-INVAS EAR/PLS OXIMETRY 1: CPT

## 2023-09-09 PROCEDURE — 2700000000 HC OXYGEN THERAPY PER DAY

## 2023-09-09 PROCEDURE — 84100 ASSAY OF PHOSPHORUS: CPT

## 2023-09-09 PROCEDURE — 2140000000 HC CCU INTERMEDIATE R&B

## 2023-09-09 PROCEDURE — 94640 AIRWAY INHALATION TREATMENT: CPT

## 2023-09-09 PROCEDURE — 93005 ELECTROCARDIOGRAM TRACING: CPT

## 2023-09-09 PROCEDURE — 36415 COLL VENOUS BLD VENIPUNCTURE: CPT

## 2023-09-09 PROCEDURE — 71250 CT THORAX DX C-: CPT

## 2023-09-09 PROCEDURE — 83880 ASSAY OF NATRIURETIC PEPTIDE: CPT

## 2023-09-09 RX ORDER — MAGNESIUM SULFATE IN WATER 40 MG/ML
2000 INJECTION, SOLUTION INTRAVENOUS ONCE
Status: COMPLETED | OUTPATIENT
Start: 2023-09-09 | End: 2023-09-09

## 2023-09-09 RX ORDER — MEGESTROL ACETATE 40 MG/ML
200 SUSPENSION ORAL DAILY
Status: DISCONTINUED | OUTPATIENT
Start: 2023-09-09 | End: 2023-09-19 | Stop reason: HOSPADM

## 2023-09-09 RX ORDER — DIPHENHYDRAMINE HYDROCHLORIDE 50 MG/ML
25 INJECTION INTRAMUSCULAR; INTRAVENOUS EVERY 6 HOURS PRN
Status: DISCONTINUED | OUTPATIENT
Start: 2023-09-09 | End: 2023-09-10

## 2023-09-09 RX ORDER — BUDESONIDE AND FORMOTEROL FUMARATE DIHYDRATE 160; 4.5 UG/1; UG/1
2 AEROSOL RESPIRATORY (INHALATION)
Status: DISCONTINUED | OUTPATIENT
Start: 2023-09-09 | End: 2023-09-19 | Stop reason: HOSPADM

## 2023-09-09 RX ORDER — DIPHENHYDRAMINE HYDROCHLORIDE 50 MG/ML
50 INJECTION INTRAMUSCULAR; INTRAVENOUS ONCE
Status: COMPLETED | OUTPATIENT
Start: 2023-09-09 | End: 2023-09-09

## 2023-09-09 RX ORDER — CALCIUM GLUCONATE 20 MG/ML
1000 INJECTION, SOLUTION INTRAVENOUS ONCE
Status: COMPLETED | OUTPATIENT
Start: 2023-09-09 | End: 2023-09-09

## 2023-09-09 RX ADMIN — ASPIRIN 81 MG: 81 TABLET, CHEWABLE ORAL at 09:18

## 2023-09-09 RX ADMIN — BUDESONIDE AND FORMOTEROL FUMARATE DIHYDRATE 2 PUFF: 160; 4.5 AEROSOL RESPIRATORY (INHALATION) at 10:33

## 2023-09-09 RX ADMIN — POTASSIUM CHLORIDE 10 MEQ: 7.45 INJECTION INTRAVENOUS at 07:10

## 2023-09-09 RX ADMIN — BUDESONIDE AND FORMOTEROL FUMARATE DIHYDRATE 2 PUFF: 160; 4.5 AEROSOL RESPIRATORY (INHALATION) at 18:53

## 2023-09-09 RX ADMIN — NYSTATIN 500000 UNITS: 100000 SUSPENSION ORAL at 18:25

## 2023-09-09 RX ADMIN — POTASSIUM CHLORIDE 10 MEQ: 7.45 INJECTION INTRAVENOUS at 05:21

## 2023-09-09 RX ADMIN — DIPHENHYDRAMINE HYDROCHLORIDE 50 MG: 50 INJECTION, SOLUTION INTRAMUSCULAR; INTRAVENOUS at 11:25

## 2023-09-09 RX ADMIN — POTASSIUM CHLORIDE AND SODIUM CHLORIDE: 900; 150 INJECTION, SOLUTION INTRAVENOUS at 18:25

## 2023-09-09 RX ADMIN — POTASSIUM CHLORIDE 10 MEQ: 7.45 INJECTION INTRAVENOUS at 03:06

## 2023-09-09 RX ADMIN — POTASSIUM CHLORIDE 10 MEQ: 7.45 INJECTION INTRAVENOUS at 01:12

## 2023-09-09 RX ADMIN — POTASSIUM CHLORIDE 10 MEQ: 7.45 INJECTION INTRAVENOUS at 04:04

## 2023-09-09 RX ADMIN — MONTELUKAST 10 MG: 10 TABLET, FILM COATED ORAL at 20:55

## 2023-09-09 RX ADMIN — SODIUM PHOSPHATE, MONOBASIC, MONOHYDRATE AND SODIUM PHOSPHATE, DIBASIC, ANHYDROUS 10 MMOL: 142; 276 INJECTION, SOLUTION INTRAVENOUS at 00:15

## 2023-09-09 RX ADMIN — NYSTATIN 500000 UNITS: 100000 SUSPENSION ORAL at 11:25

## 2023-09-09 RX ADMIN — GUAIFENESIN SYRUP AND DEXTROMETHORPHAN 5 ML: 100; 10 SYRUP ORAL at 11:26

## 2023-09-09 RX ADMIN — METOPROLOL SUCCINATE 200 MG: 50 TABLET, EXTENDED RELEASE ORAL at 09:18

## 2023-09-09 RX ADMIN — CEFEPIME 2000 MG: 2 INJECTION, POWDER, FOR SOLUTION INTRAVENOUS at 23:57

## 2023-09-09 RX ADMIN — CALCIUM GLUCONATE 1000 MG: 20 INJECTION, SOLUTION INTRAVENOUS at 11:22

## 2023-09-09 RX ADMIN — SODIUM PHOSPHATE, MONOBASIC, MONOHYDRATE AND SODIUM PHOSPHATE, DIBASIC, ANHYDROUS 10 MMOL: 142; 276 INJECTION, SOLUTION INTRAVENOUS at 14:13

## 2023-09-09 RX ADMIN — ENOXAPARIN SODIUM 40 MG: 100 INJECTION SUBCUTANEOUS at 09:17

## 2023-09-09 RX ADMIN — MAGNESIUM SULFATE HEPTAHYDRATE 2000 MG: 2 INJECTION, SOLUTION INTRAVENOUS at 07:55

## 2023-09-09 RX ADMIN — CEFEPIME 2000 MG: 2 INJECTION, POWDER, FOR SOLUTION INTRAVENOUS at 11:15

## 2023-09-09 RX ADMIN — POTASSIUM CHLORIDE AND SODIUM CHLORIDE: 900; 150 INJECTION, SOLUTION INTRAVENOUS at 05:08

## 2023-09-09 RX ADMIN — POTASSIUM CHLORIDE 10 MEQ: 7.45 INJECTION INTRAVENOUS at 02:07

## 2023-09-09 RX ADMIN — PANTOPRAZOLE SODIUM 40 MG: 40 TABLET, DELAYED RELEASE ORAL at 07:06

## 2023-09-09 RX ADMIN — NYSTATIN 500000 UNITS: 100000 SUSPENSION ORAL at 20:55

## 2023-09-09 RX ADMIN — TIOTROPIUM BROMIDE INHALATION SPRAY 2 PUFF: 3.12 SPRAY, METERED RESPIRATORY (INHALATION) at 10:33

## 2023-09-09 RX ADMIN — MEGESTROL ACETATE 200 MG: 400 SUSPENSION ORAL at 09:18

## 2023-09-09 RX ADMIN — POTASSIUM CHLORIDE 40 MEQ: 1500 TABLET, EXTENDED RELEASE ORAL at 18:25

## 2023-09-09 NOTE — PROGRESS NOTES
K+ replaced in ER for 2.2 K+ will wait for redraw before initiating PRN replacement on PCU. K+ put in for lab draw.

## 2023-09-09 NOTE — PROGRESS NOTES
Pharmacy Adjustment per [facility] protocol    Mirta Mcdaniel is a 72 y.o. female. Pharmacy has adjusted medications per [facility] protocol. Recent Labs     09/08/23  1631 09/09/23  0634   BUN 11 8       Recent Labs     09/08/23  1631 09/09/23  0634   CREATININE 1.0* 0.9       Estimated Creatinine Clearance: 52 mL/min (based on SCr of 0.9 mg/dL). Height:   Ht Readings from Last 1 Encounters:   09/09/23 5' 1\" (1.549 m)     Weight:  Wt Readings from Last 1 Encounters:   09/09/23 157 lb (71.2 kg)         Plan: Adjust the following medications based on [facility] protocol:           Cefepime 2 gm IV standard infusion every 8 hours adjusted to Cefepime 2 gm IV extended infusion every 12 hours.      Electronically signed by Aditya Tobin [facility] on 9/9/2023 at 11:03 AM

## 2023-09-09 NOTE — PROGRESS NOTES
Comprehensive Nutrition Assessment    Type and Reason for Visit:  Initial, Positive Nutrition Screen    Nutrition Recommendations/Plan:   Follow for po intake, ONS supplement     Malnutrition Assessment:  Malnutrition Status: At risk for malnutrition (Comment) (09/09/23 155)    Context:  Acute Illness     Findings of the 6 clinical characteristics of malnutrition:  Energy Intake:  50% or less of estimated energy requirements for 5 or more days  Weight Loss:  No significant weight loss     Body Fat Loss:        Muscle Mass Loss:  No significant muscle mass loss    Fluid Accumulation:  No significant fluid accumulation Extremities   Strength:  Not Performed    Nutrition Assessment:    +NS for decreased weight and po intake. Aware still c/o nausea. Megace has been started. Glucose . PO intake 0-25%, will  continue current ONS    Nutrition Related Findings:      Wound Type: None       Current Nutrition Intake & Therapies:    Average Meal Intake: 0%  Average Supplements Intake: Unable to assess  ADULT DIET; Regular; 4 carb choices (60 gm/meal); Low Fat/Low Chol/High Fiber/TORRIE  ADULT ORAL NUTRITION SUPPLEMENT; Breakfast, Lunch, Dinner; Standard High Calorie/High Protein Oral Supplement    Anthropometric Measures:  Height: 5' 1\" (154.9 cm)  Ideal Body Weight (IBW): 105 lbs (48 kg)    Admission Body Weight: 154 lb (69.9 kg)  Current Body Weight: 157 lb (71.2 kg), 149.5 % IBW. Current BMI (kg/m2): 29.7  Usual Body Weight: 151 lb 1.6 oz (68.5 kg) (6/2023)  % Weight Change (Calculated): 3.9  BMI Categories: Overweight (BMI 25.0-29. 9)    Estimated Daily Nutrient Needs:  Energy Requirements Based On: Kcal/kg  Weight Used for Energy Requirements: Current  Energy (kcal/day): 1469-4395 kcals (20-25 kcals/kg)  Weight Used for Protein Requirements: Ideal  Protein (g/day): 62-95g  Method Used for Fluid Requirements: 1 ml/kcal  Fluid (ml/day): 142-1780 ml    Nutrition Diagnosis:   Inadequate oral intake related to

## 2023-09-09 NOTE — PLAN OF CARE
Nutrition Problem #1: Inadequate oral intake  Intervention: Food and/or Nutrient Delivery: Continue Current Diet, Continue Oral Nutrition Supplement  Nutritional

## 2023-09-10 PROBLEM — R21 BODY RASH: Status: ACTIVE | Noted: 2023-09-10

## 2023-09-10 LAB
ANION GAP SERPL CALCULATED.3IONS-SCNC: 12 MMOL/L (ref 7–19)
BASOPHILS # BLD: 0 K/UL (ref 0–0.2)
BASOPHILS NFR BLD: 0.2 % (ref 0–1)
BUN SERPL-MCNC: 7 MG/DL (ref 8–23)
CA-I BLD-SCNC: 0.97 MMOL/L (ref 1.12–1.32)
CALCIUM SERPL-MCNC: 7.2 MG/DL (ref 8.8–10.2)
CHLORIDE SERPL-SCNC: 102 MMOL/L (ref 98–111)
CO2 SERPL-SCNC: 24 MMOL/L (ref 22–29)
CREAT SERPL-MCNC: 0.8 MG/DL (ref 0.5–0.9)
EOSINOPHIL # BLD: 0.2 K/UL (ref 0–0.6)
EOSINOPHIL NFR BLD: 1 % (ref 0–5)
ERYTHROCYTE [DISTWIDTH] IN BLOOD BY AUTOMATED COUNT: 15.5 % (ref 11.5–14.5)
GLUCOSE SERPL-MCNC: 91 MG/DL (ref 74–109)
HCT VFR BLD AUTO: 26.7 % (ref 37–47)
HGB BLD-MCNC: 8.9 G/DL (ref 12–16)
IMM GRANULOCYTES # BLD: 0.2 K/UL
LYMPHOCYTES # BLD: 0.8 K/UL (ref 1.1–4.5)
LYMPHOCYTES NFR BLD: 4.5 % (ref 20–40)
MAGNESIUM SERPL-MCNC: 1.7 MG/DL (ref 1.6–2.4)
MCH RBC QN AUTO: 32.2 PG (ref 27–31)
MCHC RBC AUTO-ENTMCNC: 33.3 G/DL (ref 33–37)
MCV RBC AUTO: 96.7 FL (ref 81–99)
MONOCYTES # BLD: 1.1 K/UL (ref 0–0.9)
MONOCYTES NFR BLD: 6.1 % (ref 0–10)
NEUTROPHILS # BLD: 15.6 K/UL (ref 1.5–7.5)
NEUTS SEG NFR BLD: 87.3 % (ref 50–65)
PHOSPHATE SERPL-MCNC: 2.1 MG/DL (ref 2.5–4.5)
PLATELET # BLD AUTO: 514 K/UL (ref 130–400)
PMV BLD AUTO: 9.9 FL (ref 9.4–12.3)
POTASSIUM SERPL-SCNC: 3.8 MMOL/L (ref 3.5–5)
RBC # BLD AUTO: 2.76 M/UL (ref 4.2–5.4)
SODIUM SERPL-SCNC: 138 MMOL/L (ref 136–145)
WBC # BLD AUTO: 17.8 K/UL (ref 4.8–10.8)

## 2023-09-10 PROCEDURE — 86003 ALLG SPEC IGE CRUDE XTRC EA: CPT

## 2023-09-10 PROCEDURE — 2580000003 HC RX 258: Performed by: HOSPITALIST

## 2023-09-10 PROCEDURE — 6360000002 HC RX W HCPCS: Performed by: STUDENT IN AN ORGANIZED HEALTH CARE EDUCATION/TRAINING PROGRAM

## 2023-09-10 PROCEDURE — 6370000000 HC RX 637 (ALT 250 FOR IP)

## 2023-09-10 PROCEDURE — 2500000003 HC RX 250 WO HCPCS: Performed by: HOSPITALIST

## 2023-09-10 PROCEDURE — 86331 IMMUNODIFFUSION OUCHTERLONY: CPT

## 2023-09-10 PROCEDURE — 2500000003 HC RX 250 WO HCPCS: Performed by: STUDENT IN AN ORGANIZED HEALTH CARE EDUCATION/TRAINING PROGRAM

## 2023-09-10 PROCEDURE — 6360000002 HC RX W HCPCS

## 2023-09-10 PROCEDURE — 2140000000 HC CCU INTERMEDIATE R&B

## 2023-09-10 PROCEDURE — 87305 ASPERGILLUS AG IA: CPT

## 2023-09-10 PROCEDURE — 86606 ASPERGILLUS ANTIBODY: CPT

## 2023-09-10 PROCEDURE — 86005 ALLG SPEC IGE MULTIALLG SCR: CPT

## 2023-09-10 PROCEDURE — 94640 AIRWAY INHALATION TREATMENT: CPT

## 2023-09-10 PROCEDURE — 6370000000 HC RX 637 (ALT 250 FOR IP): Performed by: STUDENT IN AN ORGANIZED HEALTH CARE EDUCATION/TRAINING PROGRAM

## 2023-09-10 PROCEDURE — 83735 ASSAY OF MAGNESIUM: CPT

## 2023-09-10 PROCEDURE — 82330 ASSAY OF CALCIUM: CPT

## 2023-09-10 PROCEDURE — 2580000003 HC RX 258

## 2023-09-10 PROCEDURE — 93005 ELECTROCARDIOGRAM TRACING: CPT

## 2023-09-10 PROCEDURE — 2700000000 HC OXYGEN THERAPY PER DAY

## 2023-09-10 PROCEDURE — 80048 BASIC METABOLIC PNL TOTAL CA: CPT

## 2023-09-10 PROCEDURE — 94760 N-INVAS EAR/PLS OXIMETRY 1: CPT

## 2023-09-10 PROCEDURE — 84100 ASSAY OF PHOSPHORUS: CPT

## 2023-09-10 PROCEDURE — 2580000003 HC RX 258: Performed by: STUDENT IN AN ORGANIZED HEALTH CARE EDUCATION/TRAINING PROGRAM

## 2023-09-10 PROCEDURE — 36415 COLL VENOUS BLD VENIPUNCTURE: CPT

## 2023-09-10 PROCEDURE — 85025 COMPLETE CBC W/AUTO DIFF WBC: CPT

## 2023-09-10 RX ORDER — AZITHROMYCIN 250 MG/1
500 TABLET, FILM COATED ORAL DAILY
Status: DISCONTINUED | OUTPATIENT
Start: 2023-09-10 | End: 2023-09-13

## 2023-09-10 RX ORDER — DIPHENHYDRAMINE HYDROCHLORIDE 50 MG/ML
25 INJECTION INTRAMUSCULAR; INTRAVENOUS EVERY 6 HOURS
Status: DISCONTINUED | OUTPATIENT
Start: 2023-09-10 | End: 2023-09-15

## 2023-09-10 RX ORDER — CALCIUM GLUCONATE 20 MG/ML
1000 INJECTION, SOLUTION INTRAVENOUS ONCE
Status: COMPLETED | OUTPATIENT
Start: 2023-09-10 | End: 2023-09-10

## 2023-09-10 RX ORDER — PREDNISONE 5 MG/1
5 TABLET ORAL DAILY
Status: DISCONTINUED | OUTPATIENT
Start: 2023-09-19 | End: 2023-09-19 | Stop reason: HOSPADM

## 2023-09-10 RX ORDER — PREDNISONE 20 MG/1
20 TABLET ORAL DAILY
Status: COMPLETED | OUTPATIENT
Start: 2023-09-10 | End: 2023-09-12

## 2023-09-10 RX ORDER — BETAMETHASONE DIPROPIONATE 0.05 %
OINTMENT (GRAM) TOPICAL 2 TIMES DAILY
Status: DISCONTINUED | OUTPATIENT
Start: 2023-09-10 | End: 2023-09-10 | Stop reason: ALTCHOICE

## 2023-09-10 RX ORDER — BETAMETHASONE DIPROPIONATE 0.5 MG/G
CREAM TOPICAL 2 TIMES DAILY
Status: DISCONTINUED | OUTPATIENT
Start: 2023-09-10 | End: 2023-09-19 | Stop reason: HOSPADM

## 2023-09-10 RX ORDER — PREDNISONE 10 MG/1
10 TABLET ORAL DAILY
Status: COMPLETED | OUTPATIENT
Start: 2023-09-16 | End: 2023-09-18

## 2023-09-10 RX ADMIN — VANCOMYCIN HYDROCHLORIDE 1750 MG: 10 INJECTION, POWDER, LYOPHILIZED, FOR SOLUTION INTRAVENOUS at 11:01

## 2023-09-10 RX ADMIN — NYSTATIN 500000 UNITS: 100000 SUSPENSION ORAL at 20:05

## 2023-09-10 RX ADMIN — ASPIRIN 81 MG: 81 TABLET, CHEWABLE ORAL at 08:01

## 2023-09-10 RX ADMIN — BETAMETHASONE DIPROPIONATE: 0.5 CREAM TOPICAL at 20:05

## 2023-09-10 RX ADMIN — METOPROLOL SUCCINATE 200 MG: 50 TABLET, EXTENDED RELEASE ORAL at 08:02

## 2023-09-10 RX ADMIN — CEFEPIME 2000 MG: 2 INJECTION, POWDER, FOR SOLUTION INTRAVENOUS at 11:02

## 2023-09-10 RX ADMIN — NYSTATIN 500000 UNITS: 100000 SUSPENSION ORAL at 14:17

## 2023-09-10 RX ADMIN — DIPHENHYDRAMINE HYDROCHLORIDE 25 MG: 50 INJECTION, SOLUTION INTRAMUSCULAR; INTRAVENOUS at 10:55

## 2023-09-10 RX ADMIN — VANCOMYCIN HYDROCHLORIDE 750 MG: 10 INJECTION, POWDER, LYOPHILIZED, FOR SOLUTION INTRAVENOUS at 22:01

## 2023-09-10 RX ADMIN — BETAMETHASONE DIPROPIONATE: 0.5 CREAM TOPICAL at 10:56

## 2023-09-10 RX ADMIN — POTASSIUM & SODIUM PHOSPHATES POWDER PACK 280-160-250 MG 250 MG: 280-160-250 PACK at 20:05

## 2023-09-10 RX ADMIN — SODIUM PHOSPHATE, MONOBASIC, MONOHYDRATE AND SODIUM PHOSPHATE, DIBASIC, ANHYDROUS 15 MMOL: 142; 276 INJECTION, SOLUTION INTRAVENOUS at 05:39

## 2023-09-10 RX ADMIN — ENOXAPARIN SODIUM 40 MG: 100 INJECTION SUBCUTANEOUS at 08:01

## 2023-09-10 RX ADMIN — SODIUM CHLORIDE, PRESERVATIVE FREE 10 ML: 5 INJECTION INTRAVENOUS at 20:06

## 2023-09-10 RX ADMIN — SODIUM CHLORIDE, PRESERVATIVE FREE 10 ML: 5 INJECTION INTRAVENOUS at 08:02

## 2023-09-10 RX ADMIN — POTASSIUM & SODIUM PHOSPHATES POWDER PACK 280-160-250 MG 250 MG: 280-160-250 PACK at 10:59

## 2023-09-10 RX ADMIN — NYSTATIN 500000 UNITS: 100000 SUSPENSION ORAL at 16:49

## 2023-09-10 RX ADMIN — DIPHENHYDRAMINE HYDROCHLORIDE 25 MG: 50 INJECTION, SOLUTION INTRAMUSCULAR; INTRAVENOUS at 21:57

## 2023-09-10 RX ADMIN — BUDESONIDE AND FORMOTEROL FUMARATE DIHYDRATE 2 PUFF: 160; 4.5 AEROSOL RESPIRATORY (INHALATION) at 19:39

## 2023-09-10 RX ADMIN — POTASSIUM CHLORIDE AND SODIUM CHLORIDE: 900; 150 INJECTION, SOLUTION INTRAVENOUS at 03:12

## 2023-09-10 RX ADMIN — NYSTATIN 500000 UNITS: 100000 SUSPENSION ORAL at 08:01

## 2023-09-10 RX ADMIN — AZITHROMYCIN DIHYDRATE 500 MG: 250 TABLET ORAL at 10:55

## 2023-09-10 RX ADMIN — BUDESONIDE AND FORMOTEROL FUMARATE DIHYDRATE 2 PUFF: 160; 4.5 AEROSOL RESPIRATORY (INHALATION) at 07:25

## 2023-09-10 RX ADMIN — CALCIUM GLUCONATE 1000 MG: 20 INJECTION, SOLUTION INTRAVENOUS at 09:22

## 2023-09-10 RX ADMIN — PANTOPRAZOLE SODIUM 40 MG: 40 TABLET, DELAYED RELEASE ORAL at 05:38

## 2023-09-10 RX ADMIN — TIOTROPIUM BROMIDE INHALATION SPRAY 2 PUFF: 3.12 SPRAY, METERED RESPIRATORY (INHALATION) at 07:26

## 2023-09-10 RX ADMIN — MEGESTROL ACETATE 200 MG: 400 SUSPENSION ORAL at 08:06

## 2023-09-10 RX ADMIN — PREDNISONE 20 MG: 20 TABLET ORAL at 10:56

## 2023-09-10 RX ADMIN — POTASSIUM & SODIUM PHOSPHATES POWDER PACK 280-160-250 MG 250 MG: 280-160-250 PACK at 16:49

## 2023-09-10 RX ADMIN — DIPHENHYDRAMINE HYDROCHLORIDE 25 MG: 50 INJECTION, SOLUTION INTRAMUSCULAR; INTRAVENOUS at 15:28

## 2023-09-10 RX ADMIN — MONTELUKAST 10 MG: 10 TABLET, FILM COATED ORAL at 20:05

## 2023-09-11 ENCOUNTER — APPOINTMENT (OUTPATIENT)
Dept: GENERAL RADIOLOGY | Age: 73
DRG: 193 | End: 2023-09-11
Payer: MEDICARE

## 2023-09-11 LAB
ANION GAP SERPL CALCULATED.3IONS-SCNC: 11 MMOL/L (ref 7–19)
BASOPHILS # BLD: 0 K/UL (ref 0–0.2)
BASOPHILS NFR BLD: 0.2 % (ref 0–1)
BUN SERPL-MCNC: 10 MG/DL (ref 8–23)
CALCIUM SERPL-MCNC: 7.4 MG/DL (ref 8.8–10.2)
CHLORIDE SERPL-SCNC: 107 MMOL/L (ref 98–111)
CO2 SERPL-SCNC: 21 MMOL/L (ref 22–29)
CREAT SERPL-MCNC: 0.7 MG/DL (ref 0.5–0.9)
CRP SERPL HS-MCNC: 23.77 MG/DL (ref 0–0.5)
EKG P AXIS: 66 DEGREES
EKG P AXIS: 71 DEGREES
EKG P AXIS: 75 DEGREES
EKG P-R INTERVAL: 106 MS
EKG P-R INTERVAL: 130 MS
EKG P-R INTERVAL: 136 MS
EKG Q-T INTERVAL: 406 MS
EKG Q-T INTERVAL: 410 MS
EKG Q-T INTERVAL: 412 MS
EKG QRS DURATION: 78 MS
EKG QRS DURATION: 78 MS
EKG QRS DURATION: 80 MS
EKG QTC CALCULATION (BAZETT): 435 MS
EKG QTC CALCULATION (BAZETT): 452 MS
EKG QTC CALCULATION (BAZETT): 459 MS
EKG T AXIS: 39 DEGREES
EKG T AXIS: 41 DEGREES
EKG T AXIS: 42 DEGREES
EOSINOPHIL # BLD: 0 K/UL (ref 0–0.6)
EOSINOPHIL NFR BLD: 0.4 % (ref 0–5)
ERYTHROCYTE [DISTWIDTH] IN BLOOD BY AUTOMATED COUNT: 15.3 % (ref 11.5–14.5)
GLUCOSE SERPL-MCNC: 100 MG/DL (ref 74–109)
HCT VFR BLD AUTO: 23.5 % (ref 37–47)
HGB BLD-MCNC: 7.8 G/DL (ref 12–16)
IMM GRANULOCYTES # BLD: 0.1 K/UL
LYMPHOCYTES # BLD: 0.8 K/UL (ref 1.1–4.5)
LYMPHOCYTES NFR BLD: 7.6 % (ref 20–40)
MAGNESIUM SERPL-MCNC: 1.6 MG/DL (ref 1.6–2.4)
MCH RBC QN AUTO: 32.4 PG (ref 27–31)
MCHC RBC AUTO-ENTMCNC: 33.2 G/DL (ref 33–37)
MCV RBC AUTO: 97.5 FL (ref 81–99)
MONOCYTES # BLD: 0.4 K/UL (ref 0–0.9)
MONOCYTES NFR BLD: 4.1 % (ref 0–10)
MRSA DNA SPEC QL NAA+PROBE: NOT DETECTED
NEUTROPHILS # BLD: 9 K/UL (ref 1.5–7.5)
NEUTS SEG NFR BLD: 86.8 % (ref 50–65)
PLATELET # BLD AUTO: 458 K/UL (ref 130–400)
PMV BLD AUTO: 9.9 FL (ref 9.4–12.3)
POTASSIUM SERPL-SCNC: 3.9 MMOL/L (ref 3.5–5)
RBC # BLD AUTO: 2.41 M/UL (ref 4.2–5.4)
REASON FOR REJECTION: NORMAL
REASON FOR REJECTION: NORMAL
REJECTED TEST: NORMAL
REJECTED TEST: NORMAL
SODIUM SERPL-SCNC: 139 MMOL/L (ref 136–145)
VANCOMYCIN TROUGH SERPL-MCNC: 18.9 UG/ML (ref 10–20)
WBC # BLD AUTO: 10.4 K/UL (ref 4.8–10.8)

## 2023-09-11 PROCEDURE — 6370000000 HC RX 637 (ALT 250 FOR IP): Performed by: STUDENT IN AN ORGANIZED HEALTH CARE EDUCATION/TRAINING PROGRAM

## 2023-09-11 PROCEDURE — 87385 HISTOPLASMA CAPSUL AG IA: CPT

## 2023-09-11 PROCEDURE — 87641 MR-STAPH DNA AMP PROBE: CPT

## 2023-09-11 PROCEDURE — 2580000003 HC RX 258

## 2023-09-11 PROCEDURE — 36556 INSERT NON-TUNNEL CV CATH: CPT

## 2023-09-11 PROCEDURE — 85025 COMPLETE CBC W/AUTO DIFF WBC: CPT

## 2023-09-11 PROCEDURE — 02HV33Z INSERTION OF INFUSION DEVICE INTO SUPERIOR VENA CAVA, PERCUTANEOUS APPROACH: ICD-10-PCS | Performed by: STUDENT IN AN ORGANIZED HEALTH CARE EDUCATION/TRAINING PROGRAM

## 2023-09-11 PROCEDURE — 2700000000 HC OXYGEN THERAPY PER DAY

## 2023-09-11 PROCEDURE — 80048 BASIC METABOLIC PNL TOTAL CA: CPT

## 2023-09-11 PROCEDURE — 93005 ELECTROCARDIOGRAM TRACING: CPT

## 2023-09-11 PROCEDURE — 86140 C-REACTIVE PROTEIN: CPT

## 2023-09-11 PROCEDURE — 71045 X-RAY EXAM CHEST 1 VIEW: CPT

## 2023-09-11 PROCEDURE — 80202 ASSAY OF VANCOMYCIN: CPT

## 2023-09-11 PROCEDURE — 6370000000 HC RX 637 (ALT 250 FOR IP)

## 2023-09-11 PROCEDURE — 93010 ELECTROCARDIOGRAM REPORT: CPT | Performed by: INTERNAL MEDICINE

## 2023-09-11 PROCEDURE — 99223 1ST HOSP IP/OBS HIGH 75: CPT | Performed by: INTERNAL MEDICINE

## 2023-09-11 PROCEDURE — 36415 COLL VENOUS BLD VENIPUNCTURE: CPT

## 2023-09-11 PROCEDURE — 2140000000 HC CCU INTERMEDIATE R&B

## 2023-09-11 PROCEDURE — 6360000002 HC RX W HCPCS

## 2023-09-11 PROCEDURE — 83735 ASSAY OF MAGNESIUM: CPT

## 2023-09-11 PROCEDURE — 97116 GAIT TRAINING THERAPY: CPT

## 2023-09-11 PROCEDURE — 97161 PT EVAL LOW COMPLEX 20 MIN: CPT

## 2023-09-11 PROCEDURE — 2580000003 HC RX 258: Performed by: STUDENT IN AN ORGANIZED HEALTH CARE EDUCATION/TRAINING PROGRAM

## 2023-09-11 PROCEDURE — 87449 NOS EACH ORGANISM AG IA: CPT

## 2023-09-11 PROCEDURE — 6360000002 HC RX W HCPCS: Performed by: STUDENT IN AN ORGANIZED HEALTH CARE EDUCATION/TRAINING PROGRAM

## 2023-09-11 PROCEDURE — 2500000003 HC RX 250 WO HCPCS: Performed by: STUDENT IN AN ORGANIZED HEALTH CARE EDUCATION/TRAINING PROGRAM

## 2023-09-11 PROCEDURE — 94640 AIRWAY INHALATION TREATMENT: CPT

## 2023-09-11 RX ORDER — LORAZEPAM 0.5 MG/1
0.5 TABLET ORAL ONCE
Status: COMPLETED | OUTPATIENT
Start: 2023-09-11 | End: 2023-09-11

## 2023-09-11 RX ORDER — BUMETANIDE 0.25 MG/ML
0.5 INJECTION INTRAMUSCULAR; INTRAVENOUS ONCE
Status: COMPLETED | OUTPATIENT
Start: 2023-09-11 | End: 2023-09-11

## 2023-09-11 RX ORDER — OXYCODONE AND ACETAMINOPHEN 10; 325 MG/1; MG/1
1 TABLET ORAL ONCE
Status: COMPLETED | OUTPATIENT
Start: 2023-09-11 | End: 2023-09-11

## 2023-09-11 RX ORDER — IPRATROPIUM BROMIDE AND ALBUTEROL SULFATE 2.5; .5 MG/3ML; MG/3ML
1 SOLUTION RESPIRATORY (INHALATION) EVERY 4 HOURS PRN
Status: DISCONTINUED | OUTPATIENT
Start: 2023-09-11 | End: 2023-09-19 | Stop reason: HOSPADM

## 2023-09-11 RX ADMIN — VANCOMYCIN HYDROCHLORIDE 750 MG: 10 INJECTION, POWDER, LYOPHILIZED, FOR SOLUTION INTRAVENOUS at 14:22

## 2023-09-11 RX ADMIN — DIPHENHYDRAMINE HYDROCHLORIDE 25 MG: 50 INJECTION, SOLUTION INTRAMUSCULAR; INTRAVENOUS at 03:52

## 2023-09-11 RX ADMIN — NYSTATIN 500000 UNITS: 100000 SUSPENSION ORAL at 19:50

## 2023-09-11 RX ADMIN — NYSTATIN 500000 UNITS: 100000 SUSPENSION ORAL at 12:10

## 2023-09-11 RX ADMIN — MEGESTROL ACETATE 200 MG: 400 SUSPENSION ORAL at 09:28

## 2023-09-11 RX ADMIN — MONTELUKAST 10 MG: 10 TABLET, FILM COATED ORAL at 19:50

## 2023-09-11 RX ADMIN — CEFEPIME 2000 MG: 2 INJECTION, POWDER, FOR SOLUTION INTRAVENOUS at 00:10

## 2023-09-11 RX ADMIN — VANCOMYCIN HYDROCHLORIDE 750 MG: 10 INJECTION, POWDER, LYOPHILIZED, FOR SOLUTION INTRAVENOUS at 23:20

## 2023-09-11 RX ADMIN — ENOXAPARIN SODIUM 40 MG: 100 INJECTION SUBCUTANEOUS at 09:37

## 2023-09-11 RX ADMIN — ASPIRIN 81 MG: 81 TABLET, CHEWABLE ORAL at 09:28

## 2023-09-11 RX ADMIN — PREDNISONE 20 MG: 20 TABLET ORAL at 09:28

## 2023-09-11 RX ADMIN — LORAZEPAM 0.5 MG: 0.5 TABLET ORAL at 12:41

## 2023-09-11 RX ADMIN — SODIUM CHLORIDE, PRESERVATIVE FREE 10 ML: 5 INJECTION INTRAVENOUS at 19:51

## 2023-09-11 RX ADMIN — OXYCODONE HYDROCHLORIDE AND ACETAMINOPHEN 1 TABLET: 10; 325 TABLET ORAL at 12:41

## 2023-09-11 RX ADMIN — POTASSIUM & SODIUM PHOSPHATES POWDER PACK 280-160-250 MG 250 MG: 280-160-250 PACK at 09:29

## 2023-09-11 RX ADMIN — BETAMETHASONE DIPROPIONATE: 0.5 CREAM TOPICAL at 19:51

## 2023-09-11 RX ADMIN — CEFEPIME 2000 MG: 2 INJECTION, POWDER, FOR SOLUTION INTRAVENOUS at 14:17

## 2023-09-11 RX ADMIN — NYSTATIN 500000 UNITS: 100000 SUSPENSION ORAL at 17:43

## 2023-09-11 RX ADMIN — NYSTATIN 500000 UNITS: 100000 SUSPENSION ORAL at 09:29

## 2023-09-11 RX ADMIN — METOPROLOL SUCCINATE 200 MG: 50 TABLET, EXTENDED RELEASE ORAL at 09:28

## 2023-09-11 RX ADMIN — BUMETANIDE 0.5 MG: 0.25 INJECTION INTRAMUSCULAR; INTRAVENOUS at 09:48

## 2023-09-11 RX ADMIN — DIPHENHYDRAMINE HYDROCHLORIDE 25 MG: 50 INJECTION, SOLUTION INTRAMUSCULAR; INTRAVENOUS at 09:34

## 2023-09-11 RX ADMIN — GUAIFENESIN SYRUP AND DEXTROMETHORPHAN 5 ML: 100; 10 SYRUP ORAL at 03:51

## 2023-09-11 RX ADMIN — DIPHENHYDRAMINE HYDROCHLORIDE 25 MG: 50 INJECTION, SOLUTION INTRAMUSCULAR; INTRAVENOUS at 19:53

## 2023-09-11 RX ADMIN — SODIUM CHLORIDE, PRESERVATIVE FREE 10 ML: 5 INJECTION INTRAVENOUS at 09:49

## 2023-09-11 RX ADMIN — AZITHROMYCIN DIHYDRATE 500 MG: 250 TABLET ORAL at 09:28

## 2023-09-11 RX ADMIN — BETAMETHASONE DIPROPIONATE: 0.5 CREAM TOPICAL at 09:29

## 2023-09-11 RX ADMIN — BUDESONIDE AND FORMOTEROL FUMARATE DIHYDRATE 2 PUFF: 160; 4.5 AEROSOL RESPIRATORY (INHALATION) at 07:49

## 2023-09-11 RX ADMIN — PANTOPRAZOLE SODIUM 40 MG: 40 TABLET, DELAYED RELEASE ORAL at 06:21

## 2023-09-11 RX ADMIN — TIOTROPIUM BROMIDE INHALATION SPRAY 2 PUFF: 3.12 SPRAY, METERED RESPIRATORY (INHALATION) at 07:49

## 2023-09-11 NOTE — CONSULTS
Pulmonary and Critical Care Consult Note    [facility] Julia Doctor    MRN# [id number]    Acct# [de-identified]  9/11/2023   1:33 PM CDT    Referring Kati Vargas MD      Chief Complaint: Shortness of breath    Requesting physician: Dr. Wayne Schreiber    Reason for consult: Cavitary pneumonia      HPI: We have been consulted to see this 72y.o. year old female born on 1950. The patient presented to the hospital on 9/8/2023 due to abnormal labs. The patient was admitted to the hospital on 8/27/2023 with acute hypoxic respiratory failure due to pneumonia. She was discharged home on antibiotics. She also required to be on 2 L/min nasal cannula at home. She quit smoking about a month ago. Prior to that she smoked about half a pack a day. She apparently was feeling weak. She had lab work done by her primary that showed a potassium of 2.2. The patient was advised to present to the hospital.  The patient was admitted due to the above. She had a chest x-ray done that showed bilateral upper lobe consolidations. CT of the chest was done and confirmed the finding. The patient denies fevers at this time. She did have a slight leukocytosis at the time of her presentation. Currently she is on nasal cannula oxygen. She denies respiratory complaints. She is feeling sleepy. She did have a low-grade temp on admission. Her highest temperature was 101.2 yesterday.       Past Medical History      Past Medical History:   Diagnosis Date    GERD (gastroesophageal reflux disease)     Hypertension      SurgicalHistory  Past Surgical History:   Procedure Laterality Date    CARPAL TUNNEL RELEASE Right     GALLBLADDER SURGERY      HYSTERECTOMY, TOTAL ABDOMINAL (CERVIX REMOVED)      OVARY REMOVAL      TONSILLECTOMY AND ADENOIDECTOMY       Allergies  Allergies   Allergen Reactions    Lipitor kidney injury) ([address]) 9/8/2023 Yes    Hypokalemia 9/8/2023 Yes    Hypomagnesemia 9/8/2023 Yes    Body rash 9/10/2023 Yes          Pulmonary Assessment/plan:    Bilateral upper lobe pneumonia. This seems to be worse than her prior imaging. Currently she is on empirical antibiotics with cefepime. Continue cefepime. Consider bronchoscopy. However bronchoscopy at this time care is higher risk than average due to her hypoxic respiratory failure. Underlying emphysema with possible COPD. Continue pulmonary toilet. Continue bronchodilators. Persistent hypoxemia likely secondary to the above. Continue oxygen supplementation as necessary to maintain adequate oxygenation. Acute kidney injury and hyperkalemia. Improved. Diffuse skin rash likely allergic due to medications. Currently on IV steroids. DVT prophylaxis. Dalton Williamson MD, [facility], [facility]    The above note was generated using voice recognition software. Inadvertent typographical errors in transcription may have occurred.     Electronically signed by Dalton Williamson MD on 09/11/23 at 1:33 PM

## 2023-09-11 NOTE — PROGRESS NOTES
Physical Therapy  Facility/Department: [facility] PROGRESSIVE CARE  Physical Therapy Initial Assessment    Name: Everette White  : 1950  MRN: [id number]  Date of Service: 2023    Discharge Recommendations:  Patient would benefit from continued therapy after discharge, Continue to assess pending progress (Pt IS HOPING TO [address])          Patient Diagnosis(es): The primary encounter diagnosis was Hypokalemia. Diagnoses of Hypomagnesemia, Decreased oral intake, and History of recent pneumonia were also pertinent to this visit. Past Medical History:  has a past medical history of GERD (gastroesophageal reflux disease) and Hypertension. Past Surgical History:  has a past surgical history that includes Gallbladder surgery; Hysterectomy, total abdominal; Tonsillectomy and adenoidectomy; Carpal tunnel release (Right); and Ovary removal.    Assessment   Body Structures, Functions, Activity Limitations Requiring Skilled Therapeutic Intervention: Decreased functional mobility ; Decreased endurance;Decreased posture  Assessment: pt WOULD BENEFIT FROM SKILLED PT IN THIS SETTING TO ADDRESS HER MOBILITY/ENDURANCE DEFICITS  Therapy Prognosis: Good  Decision Making: Low Complexity  Requires PT Follow-Up: Yes  Activity Tolerance  Activity Tolerance: Patient tolerated treatment well     Plan   Physcial Therapy Plan  General Plan: 5-7 times per week  Therapy Duration: 2 Weeks  Current Treatment Recommendations: Functional mobility training, Transfer training, Endurance training, Gait training, Safety education & training, Patient/Caregiver education & training, Therapeutic activities  Additional Comments: PROGRESS AS TOLERATED.  WILL HAVE TO STAY IN ROOM WHILE IN TB R/O PRECAUTIONS. 02  Safety Devices  Type of Devices: Call light within reach, Chair alarm in place, Gait belt, Nurse notified, Left in chair     Restrictions        Subjective   General  Diagnosis: PNA, TB R/O  Follows Commands:

## 2023-09-11 NOTE — PROGRESS NOTES
Nutrition Assessment     Type and Reason for Visit: Reassess    Nutrition Recommendations/Plan:   Modify diet to Regular. Malnutrition Assessment:  Malnutrition Status: At risk for malnutrition (Comment)    Nutrition Assessment:  Pt's intake was improved at breakfast today. She continues to receive ONS TID with meals. Removing CHO/Cardiac restrictions from diet order to increase options available to pt. No hx DM documented. No new wt available. Will continue to follow. Estimated Daily Nutrient Needs:  Energy (kcal):  7500-7884 kcals (20-25 kcals/kg) Weight Used for Energy Requirements: Current     Protein (g):  62-95g Weight Used for Protein Requirements: Ideal        Fluid (ml/day):  142-1780 ml Method Used for Fluid Requirements: 1 ml/kcal    Nutrition Related Findings:     Wound Type: None    Current Nutrition Therapies:    ADULT ORAL NUTRITION SUPPLEMENT; Breakfast, Lunch, Dinner; Standard High Calorie/High Protein Oral Supplement  ADULT DIET; Regular    Anthropometric Measures:  Height: 5' 1\" (154.9 cm)  Current Body Wt: 157 lb (71.2 kg)   BMI: 29.7    Nutrition Diagnosis:   Inadequate oral intake related to acute injury/trauma as evidenced by intake 0-25%, nausea, poor intake prior to admission    Nutrition Interventions:   Food and/or Nutrient Delivery: Continue Oral Nutrition Supplement, Modify Current Diet  Nutrition Education/Counseling: No recommendation at this time  Coordination of Nutrition Care: Continue to monitor while inpatient       Goals:  Previous Goal Met: Progressing toward Goal(s)  Goals: PO intake 50% or greater       Nutrition Monitoring and Evaluation:   Behavioral-Environmental Outcomes: None Identified  Food/Nutrient Intake Outcomes: Food and Nutrient Intake, Supplement Intake  Physical Signs/Symptoms Outcomes: Biochemical Data, Nausea or Vomiting, Weight, Skin    Discharge Planning:     Too soon to determine     Elvi Haney, [address] [facility] SANTIAGO Deutsch, LD, [facility]  Contact: [phone]

## 2023-09-11 NOTE — CARE COORDINATION
Patient is current with [facility] for [address], PT, OT, MSW Services. Will follow. Please advise when patient discharges. WILL NEED RESUMPTION OF CARE ORDERS TO RESUME HH SERVICES WHEN PATIENT DISCHARGES. Thank you. [address] [facility] [phone]. [phone].     Veronica Mckinnon RN, Home Care Liaison  [phone] P  Electronically signed by Veronica Mckinnon on 9/11/2023 at 8:55 AM

## 2023-09-11 NOTE — PROGRESS NOTES
Pharmacy Adjustment per [facility] protocol    Chanda Lennox is a 72 y.o. female. Pharmacy has adjusted medications per [facility] protocol. Recent Labs     09/10/23  0137 09/11/23  0203   BUN 7* 10       Recent Labs     09/10/23  0137 09/11/23  0203   CREATININE 0.8 0.7       Estimated Creatinine Clearance: 67 mL/min (based on SCr of 0.7 mg/dL).     Height:   Ht Readings from Last 1 Encounters:   09/09/23 5' 1\" (1.549 m)     Weight:  Wt Readings from Last 1 Encounters:   09/09/23 157 lb (71.2 kg)         Plan: Adjust the following medications based on [facility] protocol:           Cefepime to 2 g IV every 8 hours extended infusion    Electronically signed by Laine Cadet RPH on 9/11/2023 at 5:00 AM

## 2023-09-11 NOTE — PROCEDURES
Central venous line placement      Risks and benefits of procedure discussed. Informed consent obtained from patient. Catheter type: Triple-lumen central venous catheter    Site: Right internal jugular vein      Patient prepped and draped in usual sterile fashion. Using ultrasound guidance, identified right IJ vein, aspirated dark venous nonpulsatile blood. Guidewire advanced easily. Skin nick was made and area dilated. Using Seldinger technique, a triple-lumen central venous catheter was advanced over guidewire into place and guidewire was removed. All ports aspirated and flushed. Catheter was sutured into place. No immediate complications. Sterile dressing was applied.     Estimated blood loss less than 5 cc    Chest x-ray obtained      Adelina Kothari MD

## 2023-09-11 NOTE — CARE COORDINATION
Case Management Assessment  Initial Evaluation    Date/Time of Evaluation: 9/11/2023 6:11 PM  Assessment Completed by: Apolinar Perkins RN    If patient is discharged prior to next notation, then this note serves as note for discharge by case management. Patient Name: Aviva England                   YOB: 1950  Diagnosis: Hypokalemia [E87.6]  Hypomagnesemia [E83.42]  SIRS (systemic inflammatory response syndrome) (HCC) [R65.10]  Decreased oral intake [R63.8]  History of recent pneumonia [Z87.01]                   Date / Time: 9/8/2023  2:33 PM    Patient Admission Status: Inpatient   Readmission Risk (Low < 19, Mod (19-27), High > 27): Readmission Risk Score: 21.5    Current PCP: ALEJANDRA Das NP  PCP verified by CM? Yes    Chart Reviewed: Yes      History Provided by: Patient  Patient Orientation: Alert and Oriented    Patient Cognition: Alert    Hospitalization in the last 30 days (Readmission):  Yes    If yes, Readmission Assessment in  Navigator will be completed. Advance Directives:      Code Status: Full Code   Patient's Primary Decision Maker is: Legal Next of Kin    Primary Decision MakerCrissie Pronto Spouse - [phone]    Discharge Planning:    Patient lives with: Spouse/Significant Other Type of Home:  (Home care vs SNF)  Primary Care Giver: Family  Patient Support Systems include: Spouse/Significant Other, Children   Current Financial resources: Medicare  Current community resources:  (none at this time)  Current services prior to admission: Durable Medical Equipment            Current DME: Walker            Type of Home Care services:  OT, PT, Nursing Services    ADLS  Prior functional level: Independent in ADLs/IADLs (baseline patient is independent)  Current functional level: Assistance with the following:    PT AM-PAC:   /24  OT AM-PAC:   /24    Family can provide assistance at DC:  Yes  Would you like Case Management to discuss the discharge plan with any other family basic dialogue that supports the patient's individualized plan of care/goals and shares the quality data associated with the providers was provided to: pt     The Patient and/or Patient Representative Agree with the Discharge Plan?  Home care  [address], RN  Case Management Department  Ph: [phone] Fax: [phone]  Electronically signed by Maura Parham RN on 9/11/2023 at 6:17 PM

## 2023-09-12 ENCOUNTER — ANESTHESIA EVENT (OUTPATIENT)
Dept: ENDOSCOPY | Age: 73
End: 2023-09-12
Payer: MEDICARE

## 2023-09-12 ENCOUNTER — ANESTHESIA (OUTPATIENT)
Dept: ENDOSCOPY | Age: 73
End: 2023-09-12
Payer: MEDICARE

## 2023-09-12 PROBLEM — J18.9 PNEUMONIA: Status: ACTIVE | Noted: 2023-09-08

## 2023-09-12 LAB
ALBUMIN SERPL-MCNC: 1.8 G/DL (ref 3.5–5.2)
ALP SERPL-CCNC: 69 U/L (ref 35–104)
ALT SERPL-CCNC: 11 U/L (ref 5–33)
ANION GAP SERPL CALCULATED.3IONS-SCNC: 8 MMOL/L (ref 7–19)
APPEARANCE FLD: NORMAL
AST SERPL-CCNC: 14 U/L (ref 5–32)
BASOPHILS # BLD: 0 K/UL (ref 0–0.2)
BASOPHILS NFR BLD: 0.1 % (ref 0–1)
BILIRUB SERPL-MCNC: 0.3 MG/DL (ref 0.2–1.2)
BODY FLD TYPE: NORMAL
BUN SERPL-MCNC: 12 MG/DL (ref 8–23)
CALCIUM SERPL-MCNC: 7.5 MG/DL (ref 8.8–10.2)
CHLORIDE SERPL-SCNC: 106 MMOL/L (ref 98–111)
CLOT EVALUATION: NORMAL
CO2 SERPL-SCNC: 24 MMOL/L (ref 22–29)
COLOR FLD: NORMAL
CREAT SERPL-MCNC: 0.8 MG/DL (ref 0.5–0.9)
EKG P AXIS: 26 DEGREES
EKG P-R INTERVAL: 86 MS
EKG Q-T INTERVAL: 408 MS
EKG QRS DURATION: 92 MS
EKG QTC CALCULATION (BAZETT): 430 MS
EKG T AXIS: 18 DEGREES
EOSINOPHIL # BLD: 0.1 K/UL (ref 0–0.6)
EOSINOPHIL NFR BLD: 1.4 % (ref 0–5)
ERYTHROCYTE [DISTWIDTH] IN BLOOD BY AUTOMATED COUNT: 15.6 % (ref 11.5–14.5)
GLUCOSE SERPL-MCNC: 94 MG/DL (ref 74–109)
HCT VFR BLD AUTO: 23.1 % (ref 37–47)
HGB BLD-MCNC: 7.8 G/DL (ref 12–16)
IMM GRANULOCYTES # BLD: 0.1 K/UL
KOH PREP SPEC: NORMAL
LYMPHOCYTES # BLD: 0.9 K/UL (ref 1.1–4.5)
LYMPHOCYTES NFR BLD: 10.1 % (ref 20–40)
LYMPHOCYTES NFR FLD: 11 %
MACROPHAGES NFR FLD MANUAL: 8 %
MCH RBC QN AUTO: 32.6 PG (ref 27–31)
MCHC RBC AUTO-ENTMCNC: 33.8 G/DL (ref 33–37)
MCV RBC AUTO: 96.7 FL (ref 81–99)
MONOCYTES # BLD: 0.5 K/UL (ref 0–0.9)
MONOCYTES NFR BLD: 5.9 % (ref 0–10)
MONOCYTES NFR FLD: 12 %
NEUTROPHILS # BLD: 7.3 K/UL (ref 1.5–7.5)
NEUTROPHILS NFR FLD: 69 %
NEUTS SEG NFR BLD: 81.7 % (ref 50–65)
NUCLEATED CELLS FLUID: 725 /CUMM
PLATELET # BLD AUTO: 422 K/UL (ref 130–400)
PMV BLD AUTO: 9.2 FL (ref 9.4–12.3)
POTASSIUM SERPL-SCNC: 3.9 MMOL/L (ref 3.5–5)
PROT SERPL-MCNC: 4.7 G/DL (ref 6.6–8.7)
RBC # BLD AUTO: 2.39 M/UL (ref 4.2–5.4)
RBC # FLD: NORMAL /CUMM
SODIUM SERPL-SCNC: 138 MMOL/L (ref 136–145)
TOTAL CELLS COUNTED FLD: 100
WBC # BLD AUTO: 9 K/UL (ref 4.8–10.8)

## 2023-09-12 PROCEDURE — 6360000002 HC RX W HCPCS: Performed by: INTERNAL MEDICINE

## 2023-09-12 PROCEDURE — 94640 AIRWAY INHALATION TREATMENT: CPT

## 2023-09-12 PROCEDURE — 80053 COMPREHEN METABOLIC PANEL: CPT

## 2023-09-12 PROCEDURE — 87102 FUNGUS ISOLATION CULTURE: CPT

## 2023-09-12 PROCEDURE — 6360000002 HC RX W HCPCS

## 2023-09-12 PROCEDURE — 2580000003 HC RX 258

## 2023-09-12 PROCEDURE — 6370000000 HC RX 637 (ALT 250 FOR IP): Performed by: INTERNAL MEDICINE

## 2023-09-12 PROCEDURE — 94760 N-INVAS EAR/PLS OXIMETRY 1: CPT

## 2023-09-12 PROCEDURE — 6360000002 HC RX W HCPCS: Performed by: NURSE ANESTHETIST, CERTIFIED REGISTERED

## 2023-09-12 PROCEDURE — 1210000000 HC MED SURG R&B

## 2023-09-12 PROCEDURE — 2580000003 HC RX 258: Performed by: INTERNAL MEDICINE

## 2023-09-12 PROCEDURE — 87070 CULTURE OTHR SPECIMN AEROBIC: CPT

## 2023-09-12 PROCEDURE — 2709999900 HC NON-CHARGEABLE SUPPLY: Performed by: INTERNAL MEDICINE

## 2023-09-12 PROCEDURE — 93005 ELECTROCARDIOGRAM TRACING: CPT | Performed by: INTERNAL MEDICINE

## 2023-09-12 PROCEDURE — 85025 COMPLETE CBC W/AUTO DIFF WBC: CPT

## 2023-09-12 PROCEDURE — 6360000002 HC RX W HCPCS: Performed by: STUDENT IN AN ORGANIZED HEALTH CARE EDUCATION/TRAINING PROGRAM

## 2023-09-12 PROCEDURE — 87015 SPECIMEN INFECT AGNT CONCNTJ: CPT

## 2023-09-12 PROCEDURE — 2580000003 HC RX 258: Performed by: STUDENT IN AN ORGANIZED HEALTH CARE EDUCATION/TRAINING PROGRAM

## 2023-09-12 PROCEDURE — 2700000000 HC OXYGEN THERAPY PER DAY

## 2023-09-12 PROCEDURE — 3609027000 HC BRONCHOSCOPY: Performed by: INTERNAL MEDICINE

## 2023-09-12 PROCEDURE — 97530 THERAPEUTIC ACTIVITIES: CPT

## 2023-09-12 PROCEDURE — 87205 SMEAR GRAM STAIN: CPT

## 2023-09-12 PROCEDURE — 6370000000 HC RX 637 (ALT 250 FOR IP): Performed by: STUDENT IN AN ORGANIZED HEALTH CARE EDUCATION/TRAINING PROGRAM

## 2023-09-12 PROCEDURE — 87206 SMEAR FLUORESCENT/ACID STAI: CPT

## 2023-09-12 PROCEDURE — 93010 ELECTROCARDIOGRAM REPORT: CPT | Performed by: INTERNAL MEDICINE

## 2023-09-12 PROCEDURE — 0B9G8ZX DRAINAGE OF LEFT UPPER LUNG LOBE, VIA NATURAL OR ARTIFICIAL OPENING ENDOSCOPIC, DIAGNOSTIC: ICD-10-PCS | Performed by: INTERNAL MEDICINE

## 2023-09-12 PROCEDURE — 99152 MOD SED SAME PHYS/QHP 5/>YRS: CPT | Performed by: INTERNAL MEDICINE

## 2023-09-12 PROCEDURE — 2580000003 HC RX 258: Performed by: NURSE ANESTHETIST, CERTIFIED REGISTERED

## 2023-09-12 PROCEDURE — 7100000001 HC PACU RECOVERY - ADDTL 15 MIN: Performed by: INTERNAL MEDICINE

## 2023-09-12 PROCEDURE — 99233 SBSQ HOSP IP/OBS HIGH 50: CPT | Performed by: INTERNAL MEDICINE

## 2023-09-12 PROCEDURE — 6370000000 HC RX 637 (ALT 250 FOR IP)

## 2023-09-12 PROCEDURE — 89051 BODY FLUID CELL COUNT: CPT

## 2023-09-12 PROCEDURE — 7100000000 HC PACU RECOVERY - FIRST 15 MIN: Performed by: INTERNAL MEDICINE

## 2023-09-12 PROCEDURE — 87116 MYCOBACTERIA CULTURE: CPT

## 2023-09-12 PROCEDURE — 97165 OT EVAL LOW COMPLEX 30 MIN: CPT

## 2023-09-12 PROCEDURE — 87305 ASPERGILLUS AG IA: CPT

## 2023-09-12 RX ORDER — PROPOFOL 10 MG/ML
INJECTION, EMULSION INTRAVENOUS CONTINUOUS PRN
Status: DISCONTINUED | OUTPATIENT
Start: 2023-09-12 | End: 2023-09-12 | Stop reason: SDUPTHER

## 2023-09-12 RX ORDER — SODIUM CHLORIDE, SODIUM LACTATE, POTASSIUM CHLORIDE, CALCIUM CHLORIDE 600; 310; 30; 20 MG/100ML; MG/100ML; MG/100ML; MG/100ML
INJECTION, SOLUTION INTRAVENOUS CONTINUOUS PRN
Status: DISCONTINUED | OUTPATIENT
Start: 2023-09-12 | End: 2023-09-12 | Stop reason: SDUPTHER

## 2023-09-12 RX ADMIN — CEFEPIME 2000 MG: 2 INJECTION, POWDER, FOR SOLUTION INTRAVENOUS at 14:59

## 2023-09-12 RX ADMIN — ASPIRIN 81 MG: 81 TABLET, CHEWABLE ORAL at 09:40

## 2023-09-12 RX ADMIN — SODIUM CHLORIDE, SODIUM LACTATE, POTASSIUM CHLORIDE, AND CALCIUM CHLORIDE: 600; 310; 30; 20 INJECTION, SOLUTION INTRAVENOUS at 15:35

## 2023-09-12 RX ADMIN — CEFEPIME 2000 MG: 2 INJECTION, POWDER, FOR SOLUTION INTRAVENOUS at 01:28

## 2023-09-12 RX ADMIN — AZITHROMYCIN DIHYDRATE 500 MG: 250 TABLET ORAL at 09:40

## 2023-09-12 RX ADMIN — SODIUM CHLORIDE, PRESERVATIVE FREE 10 ML: 5 INJECTION INTRAVENOUS at 20:30

## 2023-09-12 RX ADMIN — BUDESONIDE AND FORMOTEROL FUMARATE DIHYDRATE 2 PUFF: 160; 4.5 AEROSOL RESPIRATORY (INHALATION) at 19:36

## 2023-09-12 RX ADMIN — NYSTATIN 500000 UNITS: 100000 SUSPENSION ORAL at 20:21

## 2023-09-12 RX ADMIN — DIPHENHYDRAMINE HYDROCHLORIDE 25 MG: 50 INJECTION, SOLUTION INTRAMUSCULAR; INTRAVENOUS at 09:40

## 2023-09-12 RX ADMIN — BETAMETHASONE DIPROPIONATE: 0.5 CREAM TOPICAL at 09:41

## 2023-09-12 RX ADMIN — NYSTATIN 500000 UNITS: 100000 SUSPENSION ORAL at 09:40

## 2023-09-12 RX ADMIN — TIOTROPIUM BROMIDE INHALATION SPRAY 2 PUFF: 3.12 SPRAY, METERED RESPIRATORY (INHALATION) at 07:45

## 2023-09-12 RX ADMIN — PANTOPRAZOLE SODIUM 40 MG: 40 TABLET, DELAYED RELEASE ORAL at 09:40

## 2023-09-12 RX ADMIN — PREDNISONE 20 MG: 20 TABLET ORAL at 09:40

## 2023-09-12 RX ADMIN — DIPHENHYDRAMINE HYDROCHLORIDE 25 MG: 50 INJECTION, SOLUTION INTRAMUSCULAR; INTRAVENOUS at 15:00

## 2023-09-12 RX ADMIN — NYSTATIN 500000 UNITS: 100000 SUSPENSION ORAL at 15:00

## 2023-09-12 RX ADMIN — SODIUM CHLORIDE, PRESERVATIVE FREE 10 ML: 5 INJECTION INTRAVENOUS at 09:40

## 2023-09-12 RX ADMIN — MONTELUKAST 10 MG: 10 TABLET, FILM COATED ORAL at 20:21

## 2023-09-12 RX ADMIN — METOPROLOL SUCCINATE 200 MG: 50 TABLET, EXTENDED RELEASE ORAL at 09:40

## 2023-09-12 RX ADMIN — ENOXAPARIN SODIUM 40 MG: 100 INJECTION SUBCUTANEOUS at 09:40

## 2023-09-12 RX ADMIN — PROPOFOL 140 MCG/KG/MIN: 10 INJECTION, EMULSION INTRAVENOUS at 15:37

## 2023-09-12 RX ADMIN — VANCOMYCIN HYDROCHLORIDE 750 MG: 10 INJECTION, POWDER, LYOPHILIZED, FOR SOLUTION INTRAVENOUS at 11:21

## 2023-09-12 RX ADMIN — DIPHENHYDRAMINE HYDROCHLORIDE 25 MG: 50 INJECTION, SOLUTION INTRAMUSCULAR; INTRAVENOUS at 03:30

## 2023-09-12 RX ADMIN — BUDESONIDE AND FORMOTEROL FUMARATE DIHYDRATE 2 PUFF: 160; 4.5 AEROSOL RESPIRATORY (INHALATION) at 07:45

## 2023-09-12 RX ADMIN — DIPHENHYDRAMINE HYDROCHLORIDE 25 MG: 50 INJECTION, SOLUTION INTRAMUSCULAR; INTRAVENOUS at 20:26

## 2023-09-12 NOTE — ANESTHESIA POSTPROCEDURE EVALUATION
Department of Anesthesiology  Postprocedure Note    Patient: Kosta Bae  MRN: [id number]  YOB: 1950  Date of evaluation: 9/12/2023      Procedure Summary     Date: 09/12/23 Room / Location: [address]    Anesthesia Start: 2289 Anesthesia Stop:     Procedure: BRONCHOSCOPY (Bilateral) Diagnosis:       Pneumonia      (Pneumonia [J18.9])    Surgeons: Kiko Wilson MD Responsible Provider: ALEJANDRA Michaud CRNA    Anesthesia Type: MAC ASA Status: 4          Anesthesia Type: No value filed.     Pao Phase I:      Pao Phase II:        Anesthesia Post Evaluation    Patient location during evaluation: bedside  Patient participation: complete - patient participated  Level of consciousness: sleepy but conscious  Pain score: 0  Airway patency: patent  Nausea & Vomiting: no nausea and no vomiting  Complications: no  Cardiovascular status: blood pressure returned to baseline  Respiratory status: CPAP  Hydration status: euvolemic  Pain management: adequate

## 2023-09-12 NOTE — PROGRESS NOTES
Occupational Therapy  Facility/Department: [facility] PROGRESSIVE CARE  Occupational Therapy Initial Assessment    Name: Ashely Card  : 1950  MRN: [id number]  Date of Service: 2023    Discharge Recommendations:             Patient Diagnosis(es): The primary encounter diagnosis was Hypokalemia. Diagnoses of Hypomagnesemia, Decreased oral intake, and History of recent pneumonia were also pertinent to this visit. Past Medical History:  has a past medical history of GERD (gastroesophageal reflux disease) and Hypertension. Past Surgical History:  has a past surgical history that includes Gallbladder surgery; Hysterectomy, total abdominal; Tonsillectomy and adenoidectomy; Carpal tunnel release (Right); Ovary removal; and central line (2023). Treatment Diagnosis: Pneumonia, Hypoxia      Assessment   Performance deficits / Impairments: Decreased functional mobility ; Decreased balance;Decreased endurance;Decreased ADL status  Assessment: Will progress as tolerated  Treatment Diagnosis: Pneumonia, Hypoxia  Prognosis: Good  Decision Making: Low Complexity  REQUIRES OT FOLLOW-UP: Yes  Activity Tolerance  Activity Tolerance: Patient limited by fatigue        Plan   Occupational Therapy Plan  Times Per Week: 3-5x/week  Times Per Day:  Once a day     Restrictions       Subjective   General  Chart Reviewed: Yes  Patient assessed for rehabilitation services?: Yes  Family / Caregiver Present: No  Diagnosis: Pneumonia, Hypoxia  General Comment  Comments: Pt. currently on 3 liters O2 , awaiting a bronchospopy     Social/Functional History  Social/Functional History  Lives With: Spouse  Type of Home: Mobile home  Home Layout: One level  Home Access: Stairs to enter with rails, Ramped entrance  Bathroom Shower/Tub: Tub/Shower unit  Bathroom Toilet: Standard  Bathroom Accessibility: Accessible  Home Equipment: luci Romero, Oxygen  Receives Help From: Family  ADL Assistance: Independent  Ambulation Assistance:

## 2023-09-12 NOTE — CARE COORDINATION
Pt has been working w/therapy. If patient does not reach home level of care, CM has provided patient with SNF choice letter. Pt having Bronchoscopy today.   Electronically signed by Marva Bey RN on 9/12/2023 at 6:20 PM

## 2023-09-12 NOTE — PROCEDURES
After consent and under sedation provided by the anesthesia service the patient underwent bronchoscopy through the mouth. The vocal cords were normal.  Trachea was normal.  Samantha was sharp and midline. Right and left bronchial trees were explored. There was no evidence of endobronchial tumor or obstruction there was a scant white secretions from the left upper lobe. Left upper lobe bronchoalveolar lavage was obtained today using 50 cc aliquots of normal saline x2. Specimen was submitted for her microbiology and cytology studies. Patient tolerated procedure well. No immediate postoperative complications. Estimated blood loss: 0 cc. Complications: None. Specimen:  1. Bronchoalveolar lavage left upper lobe. Disposition: Postanesthesia recovery per anesthesia service.

## 2023-09-12 NOTE — PROGRESS NOTES
Pharmacy Adjustment per [facility] protocol    Aviva England is a 72 y.o. female. Pharmacy has adjusted medications per [facility] protocol. Recent Labs     09/11/23  0203 09/12/23  0340   BUN 10 12       Recent Labs     09/11/23  0203 09/12/23  0340   CREATININE 0.7 0.8       Estimated Creatinine Clearance: 58 mL/min (based on SCr of 0.8 mg/dL). Height:   Ht Readings from Last 1 Encounters:   09/09/23 5' 1\" (1.549 m)     Weight:  Wt Readings from Last 1 Encounters:   09/09/23 157 lb (71.2 kg)         Plan: Adjust the following medications based on [facility] protocol:           Cefepime to 2 g IV every 12 hours extended infusion.     Electronically signed by Jose Kothari, [facility] on 9/12/2023 at 6:02 AM

## 2023-09-13 ENCOUNTER — APPOINTMENT (OUTPATIENT)
Dept: GENERAL RADIOLOGY | Age: 73
DRG: 193 | End: 2023-09-13
Payer: MEDICARE

## 2023-09-13 PROBLEM — E43 SEVERE MALNUTRITION (HCC): Status: ACTIVE | Noted: 2023-09-13

## 2023-09-13 LAB
ALBUMIN SERPL-MCNC: 1.9 G/DL (ref 3.5–5.2)
ALP SERPL-CCNC: 67 U/L (ref 35–104)
ALT SERPL-CCNC: 13 U/L (ref 5–33)
ANION GAP SERPL CALCULATED.3IONS-SCNC: 8 MMOL/L (ref 7–19)
AST SERPL-CCNC: 15 U/L (ref 5–32)
BACTERIA BLD CULT ORG #2: NORMAL
BACTERIA BLD CULT: NORMAL
BILIRUB SERPL-MCNC: 0.3 MG/DL (ref 0.2–1.2)
BUN SERPL-MCNC: 11 MG/DL (ref 8–23)
CALCIUM SERPL-MCNC: 7.9 MG/DL (ref 8.8–10.2)
CHLORIDE SERPL-SCNC: 105 MMOL/L (ref 98–111)
CO2 SERPL-SCNC: 25 MMOL/L (ref 22–29)
CREAT SERPL-MCNC: 0.8 MG/DL (ref 0.5–0.9)
EKG P AXIS: 90 DEGREES
EKG P-R INTERVAL: 134 MS
EKG Q-T INTERVAL: 428 MS
EKG QRS DURATION: 78 MS
EKG QTC CALCULATION (BAZETT): 442 MS
EKG T AXIS: 70 DEGREES
ERYTHROCYTE [DISTWIDTH] IN BLOOD BY AUTOMATED COUNT: 15.5 % (ref 11.5–14.5)
GALACTOMANNAN AG SERPL QL IA: NEGATIVE
GALACTOMANNAN AG SERPL-ACNC: 0.04
GLUCOSE BLD-MCNC: 118 MG/DL (ref 70–99)
GLUCOSE BLD-MCNC: 126 MG/DL (ref 70–99)
GLUCOSE BLD-MCNC: 157 MG/DL (ref 70–99)
GLUCOSE BLD-MCNC: 77 MG/DL (ref 70–99)
GLUCOSE SERPL-MCNC: 121 MG/DL (ref 74–109)
HCT VFR BLD AUTO: 24 % (ref 37–47)
HGB BLD-MCNC: 7.9 G/DL (ref 12–16)
MAGNESIUM SERPL-MCNC: 1.5 MG/DL (ref 1.6–2.4)
MCH RBC QN AUTO: 32.1 PG (ref 27–31)
MCHC RBC AUTO-ENTMCNC: 32.9 G/DL (ref 33–37)
MCV RBC AUTO: 97.6 FL (ref 81–99)
PERFORMED ON: ABNORMAL
PERFORMED ON: NORMAL
PLATELET # BLD AUTO: 410 K/UL (ref 130–400)
PMV BLD AUTO: 9.5 FL (ref 9.4–12.3)
POTASSIUM SERPL-SCNC: 3.3 MMOL/L (ref 3.5–5)
PROT SERPL-MCNC: 5.1 G/DL (ref 6.6–8.7)
RBC # BLD AUTO: 2.46 M/UL (ref 4.2–5.4)
SODIUM SERPL-SCNC: 138 MMOL/L (ref 136–145)
WBC # BLD AUTO: 14.3 K/UL (ref 4.8–10.8)

## 2023-09-13 PROCEDURE — 6370000000 HC RX 637 (ALT 250 FOR IP): Performed by: STUDENT IN AN ORGANIZED HEALTH CARE EDUCATION/TRAINING PROGRAM

## 2023-09-13 PROCEDURE — 6360000002 HC RX W HCPCS: Performed by: INTERNAL MEDICINE

## 2023-09-13 PROCEDURE — 6370000000 HC RX 637 (ALT 250 FOR IP): Performed by: INTERNAL MEDICINE

## 2023-09-13 PROCEDURE — 82962 GLUCOSE BLOOD TEST: CPT

## 2023-09-13 PROCEDURE — 2580000003 HC RX 258: Performed by: INTERNAL MEDICINE

## 2023-09-13 PROCEDURE — 71045 X-RAY EXAM CHEST 1 VIEW: CPT

## 2023-09-13 PROCEDURE — 85027 COMPLETE CBC AUTOMATED: CPT

## 2023-09-13 PROCEDURE — 99232 SBSQ HOSP IP/OBS MODERATE 35: CPT | Performed by: INTERNAL MEDICINE

## 2023-09-13 PROCEDURE — 2500000003 HC RX 250 WO HCPCS: Performed by: STUDENT IN AN ORGANIZED HEALTH CARE EDUCATION/TRAINING PROGRAM

## 2023-09-13 PROCEDURE — 97535 SELF CARE MNGMENT TRAINING: CPT

## 2023-09-13 PROCEDURE — 94640 AIRWAY INHALATION TREATMENT: CPT

## 2023-09-13 PROCEDURE — 2700000000 HC OXYGEN THERAPY PER DAY

## 2023-09-13 PROCEDURE — 6360000002 HC RX W HCPCS: Performed by: STUDENT IN AN ORGANIZED HEALTH CARE EDUCATION/TRAINING PROGRAM

## 2023-09-13 PROCEDURE — 80053 COMPREHEN METABOLIC PANEL: CPT

## 2023-09-13 PROCEDURE — 1210000000 HC MED SURG R&B

## 2023-09-13 PROCEDURE — 97530 THERAPEUTIC ACTIVITIES: CPT

## 2023-09-13 PROCEDURE — 83735 ASSAY OF MAGNESIUM: CPT

## 2023-09-13 PROCEDURE — 97110 THERAPEUTIC EXERCISES: CPT

## 2023-09-13 PROCEDURE — 94760 N-INVAS EAR/PLS OXIMETRY 1: CPT

## 2023-09-13 RX ORDER — POTASSIUM CHLORIDE 20 MEQ/1
40 TABLET, EXTENDED RELEASE ORAL ONCE
Status: COMPLETED | OUTPATIENT
Start: 2023-09-13 | End: 2023-09-13

## 2023-09-13 RX ORDER — IPRATROPIUM BROMIDE AND ALBUTEROL SULFATE 2.5; .5 MG/3ML; MG/3ML
1 SOLUTION RESPIRATORY (INHALATION) ONCE
Status: COMPLETED | OUTPATIENT
Start: 2023-09-13 | End: 2023-09-13

## 2023-09-13 RX ORDER — AZITHROMYCIN 250 MG/1
500 TABLET, FILM COATED ORAL DAILY
Status: DISCONTINUED | OUTPATIENT
Start: 2023-09-14 | End: 2023-09-14

## 2023-09-13 RX ORDER — BUMETANIDE 0.25 MG/ML
1 INJECTION INTRAMUSCULAR; INTRAVENOUS ONCE
Status: COMPLETED | OUTPATIENT
Start: 2023-09-13 | End: 2023-09-13

## 2023-09-13 RX ORDER — MAGNESIUM SULFATE IN WATER 40 MG/ML
2000 INJECTION, SOLUTION INTRAVENOUS ONCE
Status: COMPLETED | OUTPATIENT
Start: 2023-09-13 | End: 2023-09-13

## 2023-09-13 RX ADMIN — BUDESONIDE AND FORMOTEROL FUMARATE DIHYDRATE 2 PUFF: 160; 4.5 AEROSOL RESPIRATORY (INHALATION) at 08:06

## 2023-09-13 RX ADMIN — SODIUM CHLORIDE, PRESERVATIVE FREE 10 ML: 5 INJECTION INTRAVENOUS at 22:24

## 2023-09-13 RX ADMIN — MONTELUKAST 10 MG: 10 TABLET, FILM COATED ORAL at 22:24

## 2023-09-13 RX ADMIN — DIPHENHYDRAMINE HYDROCHLORIDE 25 MG: 50 INJECTION, SOLUTION INTRAMUSCULAR; INTRAVENOUS at 09:56

## 2023-09-13 RX ADMIN — NYSTATIN 500000 UNITS: 100000 SUSPENSION ORAL at 17:45

## 2023-09-13 RX ADMIN — METOPROLOL SUCCINATE 200 MG: 50 TABLET, EXTENDED RELEASE ORAL at 09:56

## 2023-09-13 RX ADMIN — IPRATROPIUM BROMIDE AND ALBUTEROL SULFATE 1 DOSE: 2.5; .5 SOLUTION RESPIRATORY (INHALATION) at 22:38

## 2023-09-13 RX ADMIN — DIPHENHYDRAMINE HYDROCHLORIDE 25 MG: 50 INJECTION, SOLUTION INTRAMUSCULAR; INTRAVENOUS at 03:15

## 2023-09-13 RX ADMIN — CEFEPIME 2000 MG: 2 INJECTION, POWDER, FOR SOLUTION INTRAVENOUS at 14:35

## 2023-09-13 RX ADMIN — IPRATROPIUM BROMIDE AND ALBUTEROL SULFATE 1 DOSE: .5; 3 SOLUTION RESPIRATORY (INHALATION) at 08:14

## 2023-09-13 RX ADMIN — TIOTROPIUM BROMIDE INHALATION SPRAY 2 PUFF: 3.12 SPRAY, METERED RESPIRATORY (INHALATION) at 08:19

## 2023-09-13 RX ADMIN — NYSTATIN 500000 UNITS: 100000 SUSPENSION ORAL at 09:55

## 2023-09-13 RX ADMIN — DIPHENHYDRAMINE HYDROCHLORIDE 25 MG: 50 INJECTION, SOLUTION INTRAMUSCULAR; INTRAVENOUS at 22:24

## 2023-09-13 RX ADMIN — BUDESONIDE AND FORMOTEROL FUMARATE DIHYDRATE 2 PUFF: 160; 4.5 AEROSOL RESPIRATORY (INHALATION) at 19:02

## 2023-09-13 RX ADMIN — POTASSIUM CHLORIDE 40 MEQ: 1500 TABLET, EXTENDED RELEASE ORAL at 14:27

## 2023-09-13 RX ADMIN — MEGESTROL ACETATE 200 MG: 400 SUSPENSION ORAL at 09:55

## 2023-09-13 RX ADMIN — BUMETANIDE 1 MG: 0.25 INJECTION INTRAMUSCULAR; INTRAVENOUS at 11:38

## 2023-09-13 RX ADMIN — CEFEPIME 2000 MG: 2 INJECTION, POWDER, FOR SOLUTION INTRAVENOUS at 01:15

## 2023-09-13 RX ADMIN — NYSTATIN 500000 UNITS: 100000 SUSPENSION ORAL at 22:24

## 2023-09-13 RX ADMIN — NYSTATIN 500000 UNITS: 100000 SUSPENSION ORAL at 14:27

## 2023-09-13 RX ADMIN — MAGNESIUM SULFATE HEPTAHYDRATE 2000 MG: 40 INJECTION, SOLUTION INTRAVENOUS at 14:32

## 2023-09-13 RX ADMIN — PREDNISONE 15 MG: 10 TABLET ORAL at 09:55

## 2023-09-13 RX ADMIN — BETAMETHASONE DIPROPIONATE: 0.5 CREAM TOPICAL at 09:59

## 2023-09-13 RX ADMIN — SODIUM CHLORIDE, PRESERVATIVE FREE 10 ML: 5 INJECTION INTRAVENOUS at 09:59

## 2023-09-13 RX ADMIN — AZITHROMYCIN DIHYDRATE 500 MG: 250 TABLET ORAL at 09:55

## 2023-09-13 RX ADMIN — PANTOPRAZOLE SODIUM 40 MG: 40 TABLET, DELAYED RELEASE ORAL at 05:30

## 2023-09-13 RX ADMIN — ASPIRIN 81 MG: 81 TABLET, CHEWABLE ORAL at 09:56

## 2023-09-13 RX ADMIN — ENOXAPARIN SODIUM 40 MG: 100 INJECTION SUBCUTANEOUS at 09:56

## 2023-09-13 NOTE — PROGRESS NOTES
Occupational Therapy     09/13/23 1200   Subjective   Subjective Pt in bed and agreeable to participate. Pain Assessment   Pain Assessment None - Denies Pain   Vitals   O2 Device Nasal cannula   Comment On 2 liters of supplemental O2. Cognition   Overall Cognitive Status WFL   Orientation   Overall Orientation Status WFL   Bed Mobility Training   Bed Mobility Training Yes   Overall Level of Assistance Stand-by assistance   Interventions Verbal cues   Supine to Sit Stand-by assistance   Scooting Stand-by assistance   Transfer Training   Transfer Training Yes   Overall Level of Assistance Contact-guard assistance   Interventions Verbal cues; Tactile cues   Sit to Stand Contact-guard assistance   Stand to Sit Contact-guard assistance   Bed to Chair Contact-guard assistance   Toilet Transfer Contact-guard assistance   Balance   Sitting Intact   Standing With support   ADL   Feeding Independent   Grooming Supervision   UE Bathing Stand by assistance   LE Bathing Minimal assistance   UE Dressing Stand by assistance   LE Dressing Minimal assistance   Toileting Contact guard assistance   Additional Comments Fatigues quickly. Assessment   Assessment Tx focused on sitting EOB to perform dressing techniques, toileting task and transfer to recliner. Pt requires CGA for mobility and requires rest breaks. Pt complains of SOB after tasks. Nursing notified. Left in recliner with call light in reach and chair alarm donned.    Activity Tolerance Patient limited by endurance   Discharge Recommendations Patient would benefit from continued therapy after discharge   Occupational Therapy Plan   Times Per Week 3-5x/week   Times Per Day Once a day   OT Plan of Care   Wednesday X     Electronically signed by JAY Mensah on 9/13/2023 at 3:26 PM

## 2023-09-13 NOTE — PROGRESS NOTES
Comprehensive Nutrition Assessment    Type and Reason for Visit:  Reassess    Nutrition Recommendations/Plan:   ONS TID     Malnutrition Assessment:  Malnutrition Status:  Severe malnutrition (09/13/23 0844)    Context:  Acute Illness     Findings of the 6 clinical characteristics of malnutrition:  Energy Intake:  50% or less of estimated energy requirements for 5 or more days  Weight Loss:  No significant weight loss     Body Fat Loss:  No significant body fat loss     Muscle Mass Loss:  No significant muscle mass loss    Fluid Accumulation:  Moderate to Severe Extremities   Strength:  Not Performed    Nutrition Assessment:    Pt is s/p Bronchoscopy. Regular diet has been resumed. Will resume ONS to help meet nutritional needs. Continues on Megace for appetite. Last BM noted 9/11. Continue to follow clinical course. Current Nutrition Intake & Therapies:    Average Meal Intake: 51-75%, 1-25%  ADULT DIET; Regular    Anthropometric Measures:  Height: 5' 1\" (154.9 cm)  Ideal Body Weight (IBW): 105 lbs (48 kg)    Admission Body Weight: 154 lb (69.9 kg)  Current Body Weight: 157 lb (71.2 kg), 149.5 % IBW. Current BMI (kg/m2): 29.7  Usual Body Weight: 151 lb 1.6 oz (68.5 kg) (6/2023)  % Weight Change (Calculated): 3.9  BMI Categories: Overweight (BMI 25.0-29. 9)    Estimated Daily Nutrient Needs:  Energy Requirements Based On: Kcal/kg  Weight Used for Energy Requirements: Current  Energy (kcal/day): 7012-5183 kcals (20-25 kcals/kg)  Weight Used for Protein Requirements: Ideal  Protein (g/day): 62-95g  Method Used for Fluid Requirements: 1 ml/kcal  Fluid (ml/day): 142-1780 ml    Nutrition Diagnosis:   Inadequate oral intake related to acute injury/trauma as evidenced by intake 26-50%    Nutrition Interventions:   Food and/or Nutrient Delivery: Continue Current Diet, Start Oral Nutrition Supplement  Coordination of Nutrition Care: Continue to monitor while inpatient    Goals:  Previous Goal Met: Progressing toward

## 2023-09-13 NOTE — PROGRESS NOTES
Physical Therapy  Name: Suni Domínguez  MRN:  [id number]  Date of service:  9/13/2023 09/13/23 1454   Subjective   Subjective I can do a little bit. Oxygen Therapy   O2 Device Nasal cannula   Bed Mobility   Supine to Sit Stand by assistance   Sit to Supine Stand by assistance   Transfers   Sit to Stand Contact guard assistance   Stand to Sit Contact guard assistance   Exercises   Hip Flexion seated marching x 10   Knee Long Arc Quad 10   Ankle Pumps 10   Other Activities   Comment EOB sitting   Short Term Goals   Time Frame for Short Term Goals 2 WKS   Short Term Goal 1  FT WITH RW, SBA   Conditions Requiring Skilled Therapeutic Intervention   Body Structures, Functions, Activity Limitations Requiring Skilled Therapeutic Intervention Decreased functional mobility ; Decreased endurance;Decreased posture   Therapy Prognosis Good   Discharge Recommendations Patient would benefit from continued therapy after discharge;Continue to assess pending progress   Activity Tolerance   Activity Tolerance Patient tolerated treatment well;Patient limited by fatigue   Physcial Therapy Plan   General Plan 5-7 times per week   Therapy Duration 2 Weeks   Current Treatment Recommendations Functional mobility training;Transfer training; Endurance training;Gait training; Safety education & training;Patient/Caregiver education & training; Therapeutic activities   PT Plan of Care   Wednesday X   Safety Devices   Type of Devices Bed alarm in place;Call light within reach; Left in bed;Nurse notified         Electronically signed by Radha Martin PTA on 9/13/2023 at 2:57 PM

## 2023-09-13 NOTE — ACP (ADVANCE CARE PLANNING)
Advance Care Planning     Advance Care Planning Inpatient Note  [facility] Department    Today's Date: 9/13/2023  Unit: [facility] SURG SERVICES    Received request from rounding. Upon review of chart and communication with care team, patient's decision making abilities are not in question. . Patient was/were present in the room during visit. Goals of ACP Conversation:  Discuss advance care planning documents  Facilitate a discussion related to patient's goals of care as they align with the patient's values and beliefs. Health Care Decision Makers:       Primary Decision Maker: Tony Jeronimo - [phone]  Summary:  Verified Healthcare Decision Maker  Patient makes no change to review of HCDM and is not wishing to complete AD documentation. Patient names spouse as primary per [address] of next of kin. Patient  57 years and does not have all children in contact listing and does not name secondary HCDM. No change since  last ACP visit/conversation. Advance Care Planning Documents (Patient Wishes):  None     Assessment:  Patient is appreciative of visit and today is in isolation with PPE required. Patient does note feeling better since morning when difficulty with breathing more pronounced. Patient does not seem to recall meeting  at previous admission. Review of chart with patient who continues to report next of kin - spouse as her HCDM and declines assist in completing any further documentation of AD. Patient does not provide information regarding care preferences. Patient has strong belief in God and welcomes prayer as she extends her hand to 's gloved hand with prayer provided. Patient appears calm and relaxed resting eyes at end of visit.  Interventions:  Discussed and provided education on state decision maker hierarchy  Encouraged ongoing ACP conversation with future decision makers and loved ones    Care Preferences Communicated:   No    Outcomes/Plan:  ACP

## 2023-09-14 ENCOUNTER — OUTSIDE FACILITY SERVICE (OUTPATIENT)
Age: 73
End: 2023-09-14
Payer: MEDICARE

## 2023-09-14 ENCOUNTER — TELEPHONE (OUTPATIENT)
Age: 73
End: 2023-09-14
Payer: MEDICARE

## 2023-09-14 DIAGNOSIS — J18.9 PNEUMONIA OF BOTH UPPER LOBES DUE TO INFECTIOUS ORGANISM: Primary | ICD-10-CM

## 2023-09-14 LAB
ANION GAP SERPL CALCULATED.3IONS-SCNC: 6 MMOL/L (ref 7–19)
BACTERIA SPEC RESP CULT: NORMAL
BASOPHILS # BLD: 0 K/UL (ref 0–0.2)
BASOPHILS NFR BLD: 0.2 % (ref 0–1)
BUN SERPL-MCNC: 11 MG/DL (ref 8–23)
CALCIUM SERPL-MCNC: 7.8 MG/DL (ref 8.8–10.2)
CHLORIDE SERPL-SCNC: 103 MMOL/L (ref 98–111)
CO2 SERPL-SCNC: 28 MMOL/L (ref 22–29)
CREAT SERPL-MCNC: 0.7 MG/DL (ref 0.5–0.9)
EKG P AXIS: 78 DEGREES
EKG P-R INTERVAL: 130 MS
EKG Q-T INTERVAL: 406 MS
EKG QRS DURATION: 78 MS
EKG QTC CALCULATION (BAZETT): 432 MS
EKG T AXIS: 37 DEGREES
EOSINOPHIL # BLD: 0.2 K/UL (ref 0–0.6)
EOSINOPHIL NFR BLD: 1.6 % (ref 0–5)
ERYTHROCYTE [DISTWIDTH] IN BLOOD BY AUTOMATED COUNT: 15.4 % (ref 11.5–14.5)
GLUCOSE BLD-MCNC: 115 MG/DL (ref 70–99)
GLUCOSE BLD-MCNC: 126 MG/DL (ref 70–99)
GLUCOSE BLD-MCNC: 158 MG/DL (ref 70–99)
GLUCOSE BLD-MCNC: 94 MG/DL (ref 70–99)
GLUCOSE SERPL-MCNC: 83 MG/DL (ref 74–109)
GRAM STN SPEC: NORMAL
H CAPSUL AG UR IA-ACNC: NOT DETECTED NG/ML
H CAPSUL AG UR QL IA: NOT DETECTED
HCT VFR BLD AUTO: 23.9 % (ref 37–47)
HGB BLD-MCNC: 8.1 G/DL (ref 12–16)
IMM GRANULOCYTES # BLD: 0.1 K/UL
LYMPHOCYTES # BLD: 1.4 K/UL (ref 1.1–4.5)
LYMPHOCYTES NFR BLD: 12.6 % (ref 20–40)
MCH RBC QN AUTO: 32.5 PG (ref 27–31)
MCHC RBC AUTO-ENTMCNC: 33.9 G/DL (ref 33–37)
MCV RBC AUTO: 96 FL (ref 81–99)
MONOCYTES # BLD: 0.9 K/UL (ref 0–0.9)
MONOCYTES NFR BLD: 7.9 % (ref 0–10)
NEUTROPHILS # BLD: 8.8 K/UL (ref 1.5–7.5)
NEUTS SEG NFR BLD: 76.9 % (ref 50–65)
PERFORMED ON: ABNORMAL
PERFORMED ON: NORMAL
PLATELET # BLD AUTO: 388 K/UL (ref 130–400)
PMV BLD AUTO: 9.5 FL (ref 9.4–12.3)
POTASSIUM SERPL-SCNC: 3.6 MMOL/L (ref 3.5–5)
RBC # BLD AUTO: 2.49 M/UL (ref 4.2–5.4)
SODIUM SERPL-SCNC: 137 MMOL/L (ref 136–145)
WBC # BLD AUTO: 11.4 K/UL (ref 4.8–10.8)

## 2023-09-14 PROCEDURE — 6370000000 HC RX 637 (ALT 250 FOR IP): Performed by: INTERNAL MEDICINE

## 2023-09-14 PROCEDURE — 2580000003 HC RX 258: Performed by: INTERNAL MEDICINE

## 2023-09-14 PROCEDURE — 2500000003 HC RX 250 WO HCPCS: Performed by: STUDENT IN AN ORGANIZED HEALTH CARE EDUCATION/TRAINING PROGRAM

## 2023-09-14 PROCEDURE — 2700000000 HC OXYGEN THERAPY PER DAY

## 2023-09-14 PROCEDURE — 82962 GLUCOSE BLOOD TEST: CPT

## 2023-09-14 PROCEDURE — 97116 GAIT TRAINING THERAPY: CPT

## 2023-09-14 PROCEDURE — 6370000000 HC RX 637 (ALT 250 FOR IP): Performed by: STUDENT IN AN ORGANIZED HEALTH CARE EDUCATION/TRAINING PROGRAM

## 2023-09-14 PROCEDURE — 1210000000 HC MED SURG R&B

## 2023-09-14 PROCEDURE — 99232 SBSQ HOSP IP/OBS MODERATE 35: CPT | Performed by: INTERNAL MEDICINE

## 2023-09-14 PROCEDURE — 93005 ELECTROCARDIOGRAM TRACING: CPT | Performed by: INTERNAL MEDICINE

## 2023-09-14 PROCEDURE — 6360000002 HC RX W HCPCS: Performed by: INTERNAL MEDICINE

## 2023-09-14 PROCEDURE — 85025 COMPLETE CBC W/AUTO DIFF WBC: CPT

## 2023-09-14 PROCEDURE — 93010 ELECTROCARDIOGRAM REPORT: CPT | Performed by: INTERNAL MEDICINE

## 2023-09-14 PROCEDURE — 97110 THERAPEUTIC EXERCISES: CPT

## 2023-09-14 PROCEDURE — 94640 AIRWAY INHALATION TREATMENT: CPT

## 2023-09-14 PROCEDURE — 80048 BASIC METABOLIC PNL TOTAL CA: CPT

## 2023-09-14 PROCEDURE — 94760 N-INVAS EAR/PLS OXIMETRY 1: CPT

## 2023-09-14 RX ORDER — BUMETANIDE 0.25 MG/ML
1 INJECTION INTRAMUSCULAR; INTRAVENOUS ONCE
Status: COMPLETED | OUTPATIENT
Start: 2023-09-14 | End: 2023-09-14

## 2023-09-14 RX ORDER — POTASSIUM CHLORIDE 20 MEQ/1
20 TABLET, EXTENDED RELEASE ORAL 2 TIMES DAILY
Status: COMPLETED | OUTPATIENT
Start: 2023-09-14 | End: 2023-09-14

## 2023-09-14 RX ADMIN — TIOTROPIUM BROMIDE INHALATION SPRAY 2 PUFF: 3.12 SPRAY, METERED RESPIRATORY (INHALATION) at 06:42

## 2023-09-14 RX ADMIN — AZITHROMYCIN DIHYDRATE 500 MG: 250 TABLET, FILM COATED ORAL at 09:45

## 2023-09-14 RX ADMIN — METOPROLOL SUCCINATE 200 MG: 50 TABLET, EXTENDED RELEASE ORAL at 09:45

## 2023-09-14 RX ADMIN — ASPIRIN 81 MG: 81 TABLET, CHEWABLE ORAL at 09:45

## 2023-09-14 RX ADMIN — MEGESTROL ACETATE 200 MG: 400 SUSPENSION ORAL at 09:46

## 2023-09-14 RX ADMIN — CEFEPIME 2000 MG: 2 INJECTION, POWDER, FOR SOLUTION INTRAVENOUS at 01:37

## 2023-09-14 RX ADMIN — BETAMETHASONE DIPROPIONATE: 0.5 CREAM TOPICAL at 09:55

## 2023-09-14 RX ADMIN — POTASSIUM CHLORIDE 20 MEQ: 1500 TABLET, EXTENDED RELEASE ORAL at 21:53

## 2023-09-14 RX ADMIN — SODIUM CHLORIDE, PRESERVATIVE FREE 10 ML: 5 INJECTION INTRAVENOUS at 09:47

## 2023-09-14 RX ADMIN — DIPHENHYDRAMINE HYDROCHLORIDE 25 MG: 50 INJECTION, SOLUTION INTRAMUSCULAR; INTRAVENOUS at 09:47

## 2023-09-14 RX ADMIN — PANTOPRAZOLE SODIUM 40 MG: 40 TABLET, DELAYED RELEASE ORAL at 06:44

## 2023-09-14 RX ADMIN — NYSTATIN 500000 UNITS: 100000 SUSPENSION ORAL at 09:45

## 2023-09-14 RX ADMIN — NYSTATIN 500000 UNITS: 100000 SUSPENSION ORAL at 21:53

## 2023-09-14 RX ADMIN — NYSTATIN 500000 UNITS: 100000 SUSPENSION ORAL at 16:50

## 2023-09-14 RX ADMIN — MONTELUKAST 10 MG: 10 TABLET, FILM COATED ORAL at 21:53

## 2023-09-14 RX ADMIN — MEROPENEM 1000 MG: 1 INJECTION, POWDER, FOR SOLUTION INTRAVENOUS at 22:00

## 2023-09-14 RX ADMIN — POTASSIUM CHLORIDE 20 MEQ: 1500 TABLET, EXTENDED RELEASE ORAL at 09:55

## 2023-09-14 RX ADMIN — DIPHENHYDRAMINE HYDROCHLORIDE 25 MG: 50 INJECTION, SOLUTION INTRAMUSCULAR; INTRAVENOUS at 16:50

## 2023-09-14 RX ADMIN — BUMETANIDE 1 MG: 0.25 INJECTION INTRAMUSCULAR; INTRAVENOUS at 09:46

## 2023-09-14 RX ADMIN — CEFEPIME 2000 MG: 2 INJECTION, POWDER, FOR SOLUTION INTRAVENOUS at 16:52

## 2023-09-14 RX ADMIN — PREDNISONE 15 MG: 10 TABLET ORAL at 09:45

## 2023-09-14 RX ADMIN — BUDESONIDE AND FORMOTEROL FUMARATE DIHYDRATE 2 PUFF: 160; 4.5 AEROSOL RESPIRATORY (INHALATION) at 19:13

## 2023-09-14 RX ADMIN — ENOXAPARIN SODIUM 40 MG: 100 INJECTION SUBCUTANEOUS at 09:46

## 2023-09-14 RX ADMIN — BUDESONIDE AND FORMOTEROL FUMARATE DIHYDRATE 2 PUFF: 160; 4.5 AEROSOL RESPIRATORY (INHALATION) at 06:42

## 2023-09-14 NOTE — CARE COORDINATION
Pt states she feels comfortable going home with HH, and has plenty of family for support. States she has had [facility] before and thought it worked out well. Expressed if for any reasons she feels she needs SNF while here to let me know.  As of now plan is home with [facility]    Electronically signed by Ramya Gorman MBA, BSW on 9/14/2023 at 11:52 AM

## 2023-09-14 NOTE — PROGRESS NOTES
Physical Therapy  Name: Cyndie Orozco  MRN:  [id number]  Date of service:  9/14/2023 09/14/23 0912   Subjective   Subjective Pt agreed to therapy. Pain Assessment   Pain Assessment None - Denies Pain   Bed Mobility   Supine to Sit Stand by assistance   Transfers   Sit to Stand Contact guard assistance   Stand to Sit Contact guard assistance    Pt able to stand at EOB for several minutes while assisted with brief change and cleanup; no LOB while standing   Ambulation   Surface Level tile   Device Rolling Walker   Other Apparatus O2   Assistance Contact guard assistance   Gait Deviations Slow [facility];Decreased step length;Decreased step height   Distance 25 FT   Exercises   Hip Flexion x10   Hip Abduction x10   Knee Long Arc Quad x10   Ankle Pumps x10   Comments BLE sitting exercise   Short Term Goals   Time Frame for Short Term Goals 2 WKS   Short Term Goal 1  FT WITH RW, SBA   Conditions Requiring Skilled Therapeutic Intervention   Body Structures, Functions, Activity Limitations Requiring Skilled Therapeutic Intervention Decreased functional mobility ; Decreased endurance;Decreased posture   Assessment Pt able to amb short distance without LOB. Not overly fatigued but pt did not want to attempt farther distance at this time. Able to complete exercises without rest break. Positioned in chair with all needs in reach. Activity Tolerance   Activity Tolerance Patient tolerated treatment well   PT Plan of Care   Thursday X   Safety Devices   Type of Devices Call light within reach; Chair alarm in place; Left in chair           Electronically signed by Jackie Bartlett PTA on 9/14/2023 at 9:19 AM

## 2023-09-14 NOTE — PROGRESS NOTES
Recent Labs     09/13/23  1121 09/14/23  0630   BUN 11 11       Recent Labs     09/13/23  1121 09/14/23  0630   CREATININE 0.8 0.7       Estimated Creatinine Clearance: 67 mL/min (based on SCr of 0.7 mg/dL).       Plan: Change Cefepime to 2000 mg IV Q8H extended infusion x 8 doses to complete 7 day duration     Electronically signed by Donna Alvarado [facility] on 9/14/2023 at 2:20 PM

## 2023-09-14 NOTE — TELEPHONE ENCOUNTER
Message sent to [facility]      [9:55 AM] Alda Sharpe ([facility])    New Consult     Deanna Brown 1950     Sondra @ [facility] [phone] called in consult.  Herson Noyola MD cavitary pneumonia. Patient is in room 505.

## 2023-09-15 ENCOUNTER — APPOINTMENT (OUTPATIENT)
Dept: CT IMAGING | Age: 73
DRG: 193 | End: 2023-09-15
Payer: MEDICARE

## 2023-09-15 ENCOUNTER — APPOINTMENT (OUTPATIENT)
Dept: MRI IMAGING | Age: 73
DRG: 193 | End: 2023-09-15
Payer: MEDICARE

## 2023-09-15 ENCOUNTER — OUTSIDE FACILITY SERVICE (OUTPATIENT)
Age: 73
End: 2023-09-15
Payer: MEDICARE

## 2023-09-15 LAB
ANION GAP SERPL CALCULATED.3IONS-SCNC: 7 MMOL/L (ref 7–19)
B DERMAT AG SER QL IA: NORMAL NG/ML
BASOPHILS # BLD: 0 K/UL (ref 0–0.2)
BASOPHILS NFR BLD: 0.2 % (ref 0–1)
BUN SERPL-MCNC: 11 MG/DL (ref 8–23)
CALCIUM SERPL-MCNC: 7.9 MG/DL (ref 8.8–10.2)
CHLORIDE SERPL-SCNC: 104 MMOL/L (ref 98–111)
CO2 SERPL-SCNC: 28 MMOL/L (ref 22–29)
CREAT SERPL-MCNC: 0.7 MG/DL (ref 0.5–0.9)
EOSINOPHIL # BLD: 0.2 K/UL (ref 0–0.6)
EOSINOPHIL NFR BLD: 2 % (ref 0–5)
ERYTHROCYTE [DISTWIDTH] IN BLOOD BY AUTOMATED COUNT: 15.6 % (ref 11.5–14.5)
GLUCOSE BLD-MCNC: 139 MG/DL (ref 70–99)
GLUCOSE BLD-MCNC: 183 MG/DL (ref 70–99)
GLUCOSE BLD-MCNC: 183 MG/DL (ref 70–99)
GLUCOSE BLD-MCNC: 81 MG/DL (ref 70–99)
GLUCOSE SERPL-MCNC: 79 MG/DL (ref 74–109)
HCT VFR BLD AUTO: 23.4 % (ref 37–47)
HGB BLD-MCNC: 7.8 G/DL (ref 12–16)
IMM GRANULOCYTES # BLD: 0.1 K/UL
LYMPHOCYTES # BLD: 1.5 K/UL (ref 1.1–4.5)
LYMPHOCYTES NFR BLD: 17.4 % (ref 20–40)
MCH RBC QN AUTO: 32.4 PG (ref 27–31)
MCHC RBC AUTO-ENTMCNC: 33.3 G/DL (ref 33–37)
MCV RBC AUTO: 97.1 FL (ref 81–99)
MONOCYTES # BLD: 0.7 K/UL (ref 0–0.9)
MONOCYTES NFR BLD: 8.5 % (ref 0–10)
NEUTROPHILS # BLD: 6.2 K/UL (ref 1.5–7.5)
NEUTS SEG NFR BLD: 71.3 % (ref 50–65)
PERFORMED ON: ABNORMAL
PERFORMED ON: NORMAL
PLATELET # BLD AUTO: 350 K/UL (ref 130–400)
PMV BLD AUTO: 9.7 FL (ref 9.4–12.3)
POTASSIUM SERPL-SCNC: 4 MMOL/L (ref 3.5–5)
RBC # BLD AUTO: 2.41 M/UL (ref 4.2–5.4)
SODIUM SERPL-SCNC: 139 MMOL/L (ref 136–145)
WBC # BLD AUTO: 8.7 K/UL (ref 4.8–10.8)

## 2023-09-15 PROCEDURE — 2700000000 HC OXYGEN THERAPY PER DAY

## 2023-09-15 PROCEDURE — 70450 CT HEAD/BRAIN W/O DYE: CPT

## 2023-09-15 PROCEDURE — 6360000002 HC RX W HCPCS: Performed by: INTERNAL MEDICINE

## 2023-09-15 PROCEDURE — 94760 N-INVAS EAR/PLS OXIMETRY 1: CPT

## 2023-09-15 PROCEDURE — 82962 GLUCOSE BLOOD TEST: CPT

## 2023-09-15 PROCEDURE — 93005 ELECTROCARDIOGRAM TRACING: CPT | Performed by: INTERNAL MEDICINE

## 2023-09-15 PROCEDURE — 6370000000 HC RX 637 (ALT 250 FOR IP): Performed by: INTERNAL MEDICINE

## 2023-09-15 PROCEDURE — 80048 BASIC METABOLIC PNL TOTAL CA: CPT

## 2023-09-15 PROCEDURE — 94640 AIRWAY INHALATION TREATMENT: CPT

## 2023-09-15 PROCEDURE — 2580000003 HC RX 258: Performed by: INTERNAL MEDICINE

## 2023-09-15 PROCEDURE — 2500000003 HC RX 250 WO HCPCS: Performed by: STUDENT IN AN ORGANIZED HEALTH CARE EDUCATION/TRAINING PROGRAM

## 2023-09-15 PROCEDURE — 86480 TB TEST CELL IMMUN MEASURE: CPT

## 2023-09-15 PROCEDURE — 70551 MRI BRAIN STEM W/O DYE: CPT

## 2023-09-15 PROCEDURE — 1210000000 HC MED SURG R&B

## 2023-09-15 PROCEDURE — 85025 COMPLETE CBC W/AUTO DIFF WBC: CPT

## 2023-09-15 RX ORDER — BUMETANIDE 0.25 MG/ML
0.5 INJECTION INTRAMUSCULAR; INTRAVENOUS ONCE
Status: COMPLETED | OUTPATIENT
Start: 2023-09-15 | End: 2023-09-15

## 2023-09-15 RX ORDER — DIPHENHYDRAMINE HYDROCHLORIDE 50 MG/ML
25 INJECTION INTRAMUSCULAR; INTRAVENOUS EVERY 6 HOURS PRN
Status: DISCONTINUED | OUTPATIENT
Start: 2023-09-15 | End: 2023-09-19 | Stop reason: HOSPADM

## 2023-09-15 RX ADMIN — ENOXAPARIN SODIUM 40 MG: 100 INJECTION SUBCUTANEOUS at 09:02

## 2023-09-15 RX ADMIN — MEROPENEM 1000 MG: 1 INJECTION, POWDER, FOR SOLUTION INTRAVENOUS at 21:31

## 2023-09-15 RX ADMIN — TIOTROPIUM BROMIDE INHALATION SPRAY 2 PUFF: 3.12 SPRAY, METERED RESPIRATORY (INHALATION) at 07:18

## 2023-09-15 RX ADMIN — METOPROLOL SUCCINATE 200 MG: 50 TABLET, EXTENDED RELEASE ORAL at 09:08

## 2023-09-15 RX ADMIN — NYSTATIN 500000 UNITS: 100000 SUSPENSION ORAL at 16:58

## 2023-09-15 RX ADMIN — MEGESTROL ACETATE 200 MG: 400 SUSPENSION ORAL at 09:02

## 2023-09-15 RX ADMIN — MEROPENEM 1000 MG: 1 INJECTION, POWDER, FOR SOLUTION INTRAVENOUS at 13:57

## 2023-09-15 RX ADMIN — BUDESONIDE AND FORMOTEROL FUMARATE DIHYDRATE 2 PUFF: 160; 4.5 AEROSOL RESPIRATORY (INHALATION) at 07:18

## 2023-09-15 RX ADMIN — PANTOPRAZOLE SODIUM 40 MG: 40 TABLET, DELAYED RELEASE ORAL at 06:47

## 2023-09-15 RX ADMIN — NYSTATIN 500000 UNITS: 100000 SUSPENSION ORAL at 13:54

## 2023-09-15 RX ADMIN — BUMETANIDE 0.5 MG: 0.25 INJECTION INTRAMUSCULAR; INTRAVENOUS at 13:54

## 2023-09-15 RX ADMIN — BETAMETHASONE DIPROPIONATE: 0.5 CREAM TOPICAL at 21:31

## 2023-09-15 RX ADMIN — ASPIRIN 81 MG: 81 TABLET, CHEWABLE ORAL at 09:02

## 2023-09-15 RX ADMIN — PREDNISONE 15 MG: 10 TABLET ORAL at 09:02

## 2023-09-15 RX ADMIN — SODIUM CHLORIDE, PRESERVATIVE FREE 10 ML: 5 INJECTION INTRAVENOUS at 21:31

## 2023-09-15 RX ADMIN — MONTELUKAST 10 MG: 10 TABLET, FILM COATED ORAL at 21:30

## 2023-09-15 RX ADMIN — NYSTATIN 500000 UNITS: 100000 SUSPENSION ORAL at 21:30

## 2023-09-15 RX ADMIN — MEROPENEM 1000 MG: 1 INJECTION, POWDER, FOR SOLUTION INTRAVENOUS at 05:32

## 2023-09-15 RX ADMIN — NYSTATIN 500000 UNITS: 100000 SUSPENSION ORAL at 09:02

## 2023-09-15 RX ADMIN — SODIUM CHLORIDE, PRESERVATIVE FREE 10 ML: 5 INJECTION INTRAVENOUS at 09:02

## 2023-09-15 RX ADMIN — BUDESONIDE AND FORMOTEROL FUMARATE DIHYDRATE 2 PUFF: 160; 4.5 AEROSOL RESPIRATORY (INHALATION) at 19:24

## 2023-09-15 NOTE — CONSULTS
INFECTIOUS DISEASES CONSULT NOTE    Patient:  Tiffani Mcclendon 72 y.o. female  ROOM # [unfilled]  YOB: 1950  MRN: [id number]  CSN:  [id number]  Admit date: 9/8/2023   Admitting Physician: Anamaria Perez MD  Primary Care Physician: ALEJANDRA Mccullough NP  REFERRING PROVIDER: No ref. provider found    Reason for Consultation: Cavitary pneumonia    History of Present Illness/Chief Complaint: 60-year-old woman. History of tobacco use. She indicates symptoms started 3 to 4 weeks ago. She developed fatigue. She had some cough. She indicates her cough was not productive initially. She then noticed increasing sputum production. She describes it as fairly thick and yellow. She may have had some blood-tinged sputum or hemoptysis on at least 1 occasion. She reports being hospitalized and then released home. She indicates she returned and was readmitted. She has been on broad antibiotic treatment. She has been on steroid therapy. She underwent bronchoscopy. She had a BAL which did not reveal any routine bacterial pathogens. Cytology on her BAL was negative. She does not describe any significant fever. Infectious disease asked to evaluate and offer recommendations.     Current Scheduled Medications:    potassium chloride  20 mEq Oral BID    cefepime  2,000 mg IntraVENous Q8H    azithromycin  500 mg Oral Daily    predniSONE  15 mg Oral Daily    Followed by    Naren Elmore ON 9/16/2023] predniSONE  10 mg Oral Daily    Followed by    Naren Elmore ON 9/19/2023] predniSONE  5 mg Oral Daily    diphenhydrAMINE  25 mg IntraVENous Q6H    betamethasone dipropionate   Topical BID    megestrol  200 mg Oral Daily    nystatin  5 mL Oral 4x Daily    budesonide-formoterol  2 puff Inhalation BID RT    tiotropium  2 puff Inhalation Daily RT    aspirin  81 mg Oral Daily    pantoprazole  40 mg Oral QAM AC    montelukast  10 mg Oral Nightly    metoprolol succinate  200 mg Oral Daily    sodium chloride flush  5-40 mL IntraVENous 2 times

## 2023-09-15 NOTE — PROGRESS NOTES
Daily Progress Note    Date:9/15/2023  Patient: Tyrone Mariee  : 1950  WL:[id number]  CODE:Full Code No additional code details  PCP:ALEJANDRA Alcantara - [facility]    Admit Date: 2023  2:33 PM   LOS: 7 days       Subjective: This morning she complains of some vision changes describing some diplopia. Otherwise no headache, no sudden vision loss, no focal lateralizing weakness, no change in speech. Denies any worsening shortness of breath, still with intermittent cough. Summary:  72-year-old female with severe COPD, longtime tobacco abuse, recently hospitalized with pneumonia and COPD exacerbation treated with course of antibiotics and steroids, considered for SNF due to generalized weakness but declined, discharged home with home health on , subsequent presented back on  with ongoing weakness, poor p.o. intake, sent in with abnormal outpatient labs including hypokalemia with potassium down to 2.2, BUN 1.1, also with PATEL with creatinine significant from baseline. She continues to have ongoing cough and dyspnea with minimal exertion. Since being discharged she also developed a scattered erythematous rash over her chest and back with pruritus. Repeat chest x-ray showed interval worsening of bilateral upper lobe infiltrates. CT chest was obtained to further evaluate which showed multifocal bilateral upper lobe pneumonia with some cavitary lung changes in the left upper lobe. Treated with broad-spectrum IV antibiotics. Further work-up initiated to rule out mycobacterial or fungal infection. Pulmonology consulted-underwent bronchoscopy with BAL on   AFB culture negative. Renal insufficiency, dehydration improved with fluid hydration. However later in hospitalization subsequently diuresed due to significant peripheral edema, noted proBNP and pleural effusion.  ID consulted, awaiting results of AFB cultures.    9/15 patient developed some diplopia, CT head with no acute

## 2023-09-15 NOTE — PROGRESS NOTES
Physical Therapy  Name: Benjamin Wasserman  MRN:  [id number]  Date of service:  9/15/2023     09/15/23 1458   Subjective   Subjective Attempted earlier this afternoon but pt out to MRI at that time. Will continue to follow.            Electronically signed by Faiza Pichardo PTA on 9/15/2023 at 3:00 PM

## 2023-09-15 NOTE — PLAN OF CARE
Problem: Discharge Planning  Goal: Discharge to home or other facility with appropriate resources  9/15/2023 1136 by Lb Chavira RN  Outcome: Progressing  Flowsheets (Taken 9/15/2023 0911)  Discharge to home or other facility with appropriate resources: Identify barriers to discharge with patient and caregiver  9/15/2023 0101 by Valerio Ferro RN  Outcome: Progressing  Flowsheets (Taken 9/14/2023 1930)  Discharge to home or other facility with appropriate resources: Identify barriers to discharge with patient and caregiver     Problem: Skin/Tissue Integrity  Goal: Absence of new skin breakdown  Description: 1. Monitor for areas of redness and/or skin breakdown  2. Assess vascular access sites hourly  3. Every 4-6 hours minimum:  Change oxygen saturation probe site  4. Every 4-6 hours:  If on nasal continuous positive airway pressure, respiratory therapy assess nares and determine need for appliance change or resting period.   9/15/2023 1136 by Lb Chavira RN  Outcome: Progressing  9/15/2023 0101 by Valerio Ferro RN  Outcome: Progressing     Problem: Safety - Adult  Goal: Free from fall injury  9/15/2023 1136 by Lb Chavira RN  Outcome: Progressing  9/15/2023 0101 by Valerio Ferro RN  Outcome: Progressing  Flowsheets (Taken 9/14/2023 2330)  Free From Fall Injury: Instruct family/caregiver on patient safety     Problem: ABCDS Injury Assessment  Goal: Absence of physical injury  9/15/2023 1136 by Lb Chavira RN  Outcome: Progressing  9/15/2023 0101 by Valerio Ferro RN  Outcome: Progressing  Flowsheets (Taken 9/14/2023 2330)  Absence of Physical Injury: Implement safety measures based on patient assessment     Problem: Nutrition Deficit:  Goal: Optimize nutritional status  9/15/2023 1136 by Lb Chavira RN  Outcome: Progressing  9/15/2023 0101 by Valerio Ferro RN  Outcome: Progressing     Problem: Chronic Conditions and Co-morbidities  Goal: Patient's chronic conditions and co-morbidity symptoms are monitored and maintained or improved  9/15/2023 1136 by Tereso Valerio RN  Outcome: Progressing  9/15/2023 0101 by Girma Dawson RN  Outcome: Progressing  Flowsheets (Taken 9/14/2023 1930)  Care Plan - Patient's Chronic Conditions and Co-Morbidity Symptoms are Monitored and Maintained or Improved: Monitor and assess patient's chronic conditions and comorbid symptoms for stability, deterioration, or improvement

## 2023-09-16 ENCOUNTER — OUTSIDE FACILITY SERVICE (OUTPATIENT)
Age: 73
End: 2023-09-16
Payer: MEDICARE

## 2023-09-16 LAB
A FLAVUS AB SER QL ID: ABNORMAL
A FUMIGATUS1 AB SER QL ID: ABNORMAL
A FUMIGATUS2 AB SER QL: ABNORMAL
A FUMIGATUS3 AB SER QL ID: ABNORMAL
A FUMIGATUS6 AB SER QL ID: ABNORMAL
A PULLULANS AB SER QL ID: ABNORMAL
ACID FAST STN SPEC QL: NORMAL
ANION GAP SERPL CALCULATED.3IONS-SCNC: 9 MMOL/L (ref 7–19)
BASOPHILS # BLD: 0 K/UL (ref 0–0.2)
BASOPHILS NFR BLD: 0.2 % (ref 0–1)
BEEF IGE QN: <0.1 KU/L
BUN SERPL-MCNC: 11 MG/DL (ref 8–23)
CALCIUM SERPL-MCNC: 8.1 MG/DL (ref 8.8–10.2)
CHLORIDE SERPL-SCNC: 101 MMOL/L (ref 98–111)
CO2 SERPL-SCNC: 28 MMOL/L (ref 22–29)
CREAT SERPL-MCNC: 0.7 MG/DL (ref 0.5–0.9)
DEPRECATED MISC ALLERGEN IGE RAST QL: ABNORMAL
EKG P AXIS: 65 DEGREES
EKG P-R INTERVAL: 154 MS
EKG Q-T INTERVAL: 442 MS
EKG QRS DURATION: 78 MS
EKG QTC CALCULATION (BAZETT): 450 MS
EKG T AXIS: 39 DEGREES
EOSINOPHIL # BLD: 0.3 K/UL (ref 0–0.6)
EOSINOPHIL NFR BLD: 3.3 % (ref 0–5)
EPID ALLERG MIX73 IGE QN: NEGATIVE KU/L
ERYTHROCYTE [DISTWIDTH] IN BLOOD BY AUTOMATED COUNT: 15.6 % (ref 11.5–14.5)
GLUCOSE BLD-MCNC: 141 MG/DL (ref 70–99)
GLUCOSE BLD-MCNC: 184 MG/DL (ref 70–99)
GLUCOSE BLD-MCNC: 89 MG/DL (ref 70–99)
GLUCOSE BLD-MCNC: 98 MG/DL (ref 70–99)
GLUCOSE SERPL-MCNC: 82 MG/DL (ref 74–109)
HCT VFR BLD AUTO: 23.6 % (ref 37–47)
HGB BLD-MCNC: 7.8 G/DL (ref 12–16)
IMM GRANULOCYTES # BLD: 0.1 K/UL
LYMPHOCYTES # BLD: 1.7 K/UL (ref 1.1–4.5)
LYMPHOCYTES NFR BLD: 18.4 % (ref 20–40)
MAGNESIUM SERPL-MCNC: 1.7 MG/DL (ref 1.6–2.4)
MCH RBC QN AUTO: 32.6 PG (ref 27–31)
MCHC RBC AUTO-ENTMCNC: 33.1 G/DL (ref 33–37)
MCV RBC AUTO: 98.7 FL (ref 81–99)
MONOCYTES # BLD: 0.8 K/UL (ref 0–0.9)
MONOCYTES NFR BLD: 8.3 % (ref 0–10)
NEUTROPHILS # BLD: 6.4 K/UL (ref 1.5–7.5)
NEUTS SEG NFR BLD: 69.1 % (ref 50–65)
P BETAE IGE QN: <0.1 KU/L
PERFORMED ON: ABNORMAL
PERFORMED ON: ABNORMAL
PERFORMED ON: NORMAL
PERFORMED ON: NORMAL
PIGEON SERUM AB QL ID: ABNORMAL
PLATELET # BLD AUTO: 338 K/UL (ref 130–400)
PMV BLD AUTO: 9.7 FL (ref 9.4–12.3)
PORK IGE QN: <0.1 KU/L
POTASSIUM SERPL-SCNC: 3.5 MMOL/L (ref 3.5–5)
RBC # BLD AUTO: 2.39 M/UL (ref 4.2–5.4)
S RECTIVIRGULA AB SER QL ID: ABNORMAL
S VIRIDIS AB SER QL: DETECTED
SODIUM SERPL-SCNC: 138 MMOL/L (ref 136–145)
T CANDIDUS AB SER QL: ABNORMAL
WBC # BLD AUTO: 9.2 K/UL (ref 4.8–10.8)

## 2023-09-16 PROCEDURE — 2700000000 HC OXYGEN THERAPY PER DAY

## 2023-09-16 PROCEDURE — 6370000000 HC RX 637 (ALT 250 FOR IP): Performed by: STUDENT IN AN ORGANIZED HEALTH CARE EDUCATION/TRAINING PROGRAM

## 2023-09-16 PROCEDURE — 87015 SPECIMEN INFECT AGNT CONCNTJ: CPT

## 2023-09-16 PROCEDURE — 87206 SMEAR FLUORESCENT/ACID STAI: CPT

## 2023-09-16 PROCEDURE — 93010 ELECTROCARDIOGRAM REPORT: CPT | Performed by: INTERNAL MEDICINE

## 2023-09-16 PROCEDURE — 2580000003 HC RX 258: Performed by: INTERNAL MEDICINE

## 2023-09-16 PROCEDURE — 1210000000 HC MED SURG R&B

## 2023-09-16 PROCEDURE — 87116 MYCOBACTERIA CULTURE: CPT

## 2023-09-16 PROCEDURE — 94760 N-INVAS EAR/PLS OXIMETRY 1: CPT

## 2023-09-16 PROCEDURE — 87186 SC STD MICRODIL/AGAR DIL: CPT

## 2023-09-16 PROCEDURE — 6360000002 HC RX W HCPCS: Performed by: INTERNAL MEDICINE

## 2023-09-16 PROCEDURE — 83735 ASSAY OF MAGNESIUM: CPT

## 2023-09-16 PROCEDURE — 80048 BASIC METABOLIC PNL TOTAL CA: CPT

## 2023-09-16 PROCEDURE — 94640 AIRWAY INHALATION TREATMENT: CPT

## 2023-09-16 PROCEDURE — 85025 COMPLETE CBC W/AUTO DIFF WBC: CPT

## 2023-09-16 PROCEDURE — 6370000000 HC RX 637 (ALT 250 FOR IP): Performed by: INTERNAL MEDICINE

## 2023-09-16 PROCEDURE — 87070 CULTURE OTHR SPECIMN AEROBIC: CPT

## 2023-09-16 PROCEDURE — 87205 SMEAR GRAM STAIN: CPT

## 2023-09-16 PROCEDURE — 82962 GLUCOSE BLOOD TEST: CPT

## 2023-09-16 RX ORDER — BUMETANIDE 1 MG/1
0.5 TABLET ORAL ONCE
Status: COMPLETED | OUTPATIENT
Start: 2023-09-16 | End: 2023-09-16

## 2023-09-16 RX ADMIN — SODIUM CHLORIDE, PRESERVATIVE FREE 10 ML: 5 INJECTION INTRAVENOUS at 09:37

## 2023-09-16 RX ADMIN — TIOTROPIUM BROMIDE INHALATION SPRAY 2 PUFF: 3.12 SPRAY, METERED RESPIRATORY (INHALATION) at 07:32

## 2023-09-16 RX ADMIN — MEROPENEM 1000 MG: 1 INJECTION, POWDER, FOR SOLUTION INTRAVENOUS at 13:11

## 2023-09-16 RX ADMIN — ENOXAPARIN SODIUM 40 MG: 100 INJECTION SUBCUTANEOUS at 09:36

## 2023-09-16 RX ADMIN — NYSTATIN 500000 UNITS: 100000 SUSPENSION ORAL at 21:02

## 2023-09-16 RX ADMIN — MEROPENEM 1000 MG: 1 INJECTION, POWDER, FOR SOLUTION INTRAVENOUS at 05:42

## 2023-09-16 RX ADMIN — MEROPENEM 1000 MG: 1 INJECTION, POWDER, FOR SOLUTION INTRAVENOUS at 21:01

## 2023-09-16 RX ADMIN — BUMETANIDE 0.5 MG: 1 TABLET ORAL at 11:26

## 2023-09-16 RX ADMIN — SODIUM CHLORIDE, PRESERVATIVE FREE 10 ML: 5 INJECTION INTRAVENOUS at 21:05

## 2023-09-16 RX ADMIN — METOPROLOL SUCCINATE 200 MG: 50 TABLET, EXTENDED RELEASE ORAL at 09:36

## 2023-09-16 RX ADMIN — BUDESONIDE AND FORMOTEROL FUMARATE DIHYDRATE 2 PUFF: 160; 4.5 AEROSOL RESPIRATORY (INHALATION) at 07:31

## 2023-09-16 RX ADMIN — NYSTATIN 500000 UNITS: 100000 SUSPENSION ORAL at 09:36

## 2023-09-16 RX ADMIN — PANTOPRAZOLE SODIUM 40 MG: 40 TABLET, DELAYED RELEASE ORAL at 05:42

## 2023-09-16 RX ADMIN — NYSTATIN 500000 UNITS: 100000 SUSPENSION ORAL at 13:11

## 2023-09-16 RX ADMIN — PREDNISONE 10 MG: 10 TABLET ORAL at 09:36

## 2023-09-16 RX ADMIN — ASPIRIN 81 MG: 81 TABLET, CHEWABLE ORAL at 09:36

## 2023-09-16 RX ADMIN — MONTELUKAST 10 MG: 10 TABLET, FILM COATED ORAL at 21:02

## 2023-09-16 RX ADMIN — BUDESONIDE AND FORMOTEROL FUMARATE DIHYDRATE 2 PUFF: 160; 4.5 AEROSOL RESPIRATORY (INHALATION) at 18:12

## 2023-09-16 RX ADMIN — NYSTATIN 500000 UNITS: 100000 SUSPENSION ORAL at 16:21

## 2023-09-16 RX ADMIN — TIOTROPIUM BROMIDE INHALATION SPRAY 2 PUFF: 3.12 SPRAY, METERED RESPIRATORY (INHALATION) at 18:12

## 2023-09-16 NOTE — PROGRESS NOTES
Daily Progress Note    Date:2023  Patient: Chris Post  : 1950  HRE:[id number]  CODE:Full Code No additional code details  PCP:ALEJANDRA Mancuso - JEROMY    Admit Date: 2023  2:33 PM   LOS: 8 days     Chief Complaint   Patient presents with    Abnormal Lab     Low potassium, sent by [facility]         Subjective: Herlene Dayana. Feeling better overall. O2 requirements remain at 2L NC. No fevers or chills. Still has productive cough. Hospital Summary: 74-year-old female with severe COPD, longtime tobacco abuse, recently hospitalized with pneumonia and COPD exacerbation treated with course of antibiotics and steroids, considered for SNF due to generalized weakness but declined, discharged home with [facility] on , subsequent presented back on  with ongoing weakness, poor p.o. intake, sent in with abnormal outpatient labs including hypokalemia with potassium down to 2.2, BUN 1.1, also with PATEL with creatinine significant from baseline. She continues to have ongoing cough and dyspnea with minimal exertion. Since being discharged she also developed a scattered erythematous rash over her chest and back with pruritus. Repeat chest x-ray showed interval worsening of bilateral upper lobe infiltrates. CT chest was obtained to further evaluate which showed multifocal bilateral upper lobe pneumonia with some cavitary lung changes in the left upper lobe. Treated with broad-spectrum IV antibiotics. Further work-up initiated to rule out mycobacterial or fungal infection. Pulmonology consulted-underwent bronchoscopy with BAL on   AFB culture negative. Renal insufficiency, dehydration improved with fluid hydration. However later in hospitalization subsequently diuresed due to significant peripheral edema, noted proBNP and pleural effusion.  ID consulted, awaiting results of AFB cultures. 9/15 patient developed some diplopia, CT head with no acute findings.    awaiting AFB

## 2023-09-16 NOTE — PROGRESS NOTES
Infectious Diseases Progress Note    Patient:  Phylicia Kingsley  YOB: 1950  MRN: [id number]   Admit date: 9/8/2023   Admitting Physician: Teresa Aly MD  Primary Care Physician: ALEJANDRA Kong NP    Chief Complaint/Interval History: She seems a little bit stronger. She is not dyspneic. She was able to produce a sputum this morning. AFB smear negative. No chest pain. Remains on a stable level of oxygen support-2 L. No diarrhea or rash. Granddaughter at bedside giving her a manicure. In/Out    Intake/Output Summary (Last 24 hours) at 9/16/2023 1622  Last data filed at 9/16/2023 1553  Gross per 24 hour   Intake 905 ml   Output 3200 ml   Net -2295 ml     Allergies:    Allergies   Allergen Reactions    Lipitor [Atorvastatin] Other (See Comments)     Joints ache    Other Swelling     Steroids cause fluid retention/edema    Pcn [Penicillins] Rash     Current Meds: diphenhydrAMINE (BENADRYL) injection 25 mg, Q6H PRN  meropenem (MERREM) 1,000 mg in sodium chloride 0.9 % 100 mL IVPB ([id number]), Q8H  ipratropium 0.5 mg-albuterol 2.5 mg (DUONEB) nebulizer solution 1 Dose, Q4H PRN  predniSONE (DELTASONE) tablet 10 mg, Daily   Followed by  Dino Cabrera ON 9/19/2023] predniSONE (DELTASONE) tablet 5 mg, Daily  betamethasone dipropionate 0.05 % cream, BID  megestrol (MEGACE) 40 MG/ML suspension 200 mg, Daily  nystatin (MYCOSTATIN) 969058 UNIT/ML suspension 500,000 Units, 4x Daily  budesonide-formoterol (SYMBICORT) 160-4.5 MCG/ACT inhaler 2 puff, BID RT  tiotropium (SPIRIVA RESPIMAT) 2.5 MCG/ACT inhaler 2 puff, Daily RT  potassium chloride (KLOR-CON M) extended release tablet 40 mEq, PRN   Or  potassium bicarb-citric acid (EFFER-K) effervescent tablet 40 mEq, PRN   Or  potassium chloride 10 mEq/100 mL IVPB (Peripheral Line), PRN  magnesium sulfate 2000 mg in 50 mL IVPB premix, PRN  aspirin chewable tablet 81 mg, Daily  pantoprazole (PROTONIX) tablet 40 mg, QAM AC  guaiFENesin-dextromethorphan (ROBITUSSIN DM) 100-10

## 2023-09-17 LAB
GLUCOSE BLD-MCNC: 109 MG/DL (ref 70–99)
GLUCOSE BLD-MCNC: 155 MG/DL (ref 70–99)
GLUCOSE BLD-MCNC: 87 MG/DL (ref 70–99)
PERFORMED ON: ABNORMAL
PERFORMED ON: ABNORMAL
PERFORMED ON: NORMAL

## 2023-09-17 PROCEDURE — 6370000000 HC RX 637 (ALT 250 FOR IP): Performed by: INTERNAL MEDICINE

## 2023-09-17 PROCEDURE — 94640 AIRWAY INHALATION TREATMENT: CPT

## 2023-09-17 PROCEDURE — 82962 GLUCOSE BLOOD TEST: CPT

## 2023-09-17 PROCEDURE — 94760 N-INVAS EAR/PLS OXIMETRY 1: CPT

## 2023-09-17 PROCEDURE — 87206 SMEAR FLUORESCENT/ACID STAI: CPT

## 2023-09-17 PROCEDURE — 6360000002 HC RX W HCPCS: Performed by: INTERNAL MEDICINE

## 2023-09-17 PROCEDURE — 6370000000 HC RX 637 (ALT 250 FOR IP): Performed by: STUDENT IN AN ORGANIZED HEALTH CARE EDUCATION/TRAINING PROGRAM

## 2023-09-17 PROCEDURE — 1210000000 HC MED SURG R&B

## 2023-09-17 PROCEDURE — 87116 MYCOBACTERIA CULTURE: CPT

## 2023-09-17 PROCEDURE — 2580000003 HC RX 258: Performed by: INTERNAL MEDICINE

## 2023-09-17 PROCEDURE — 87015 SPECIMEN INFECT AGNT CONCNTJ: CPT

## 2023-09-17 PROCEDURE — 2700000000 HC OXYGEN THERAPY PER DAY

## 2023-09-17 RX ORDER — BUMETANIDE 1 MG/1
0.5 TABLET ORAL DAILY
Status: DISCONTINUED | OUTPATIENT
Start: 2023-09-17 | End: 2023-09-19 | Stop reason: HOSPADM

## 2023-09-17 RX ADMIN — PREDNISONE 10 MG: 10 TABLET ORAL at 09:15

## 2023-09-17 RX ADMIN — IPRATROPIUM BROMIDE AND ALBUTEROL SULFATE 1 DOSE: 2.5; .5 SOLUTION RESPIRATORY (INHALATION) at 17:48

## 2023-09-17 RX ADMIN — METOPROLOL SUCCINATE 200 MG: 50 TABLET, EXTENDED RELEASE ORAL at 09:16

## 2023-09-17 RX ADMIN — ENOXAPARIN SODIUM 40 MG: 100 INJECTION SUBCUTANEOUS at 09:15

## 2023-09-17 RX ADMIN — MEROPENEM 1000 MG: 1 INJECTION, POWDER, FOR SOLUTION INTRAVENOUS at 13:57

## 2023-09-17 RX ADMIN — NYSTATIN 500000 UNITS: 100000 SUSPENSION ORAL at 17:00

## 2023-09-17 RX ADMIN — ASPIRIN 81 MG: 81 TABLET, CHEWABLE ORAL at 09:16

## 2023-09-17 RX ADMIN — MEROPENEM 1000 MG: 1 INJECTION, POWDER, FOR SOLUTION INTRAVENOUS at 05:03

## 2023-09-17 RX ADMIN — MEROPENEM 1000 MG: 1 INJECTION, POWDER, FOR SOLUTION INTRAVENOUS at 23:15

## 2023-09-17 RX ADMIN — NYSTATIN 500000 UNITS: 100000 SUSPENSION ORAL at 13:56

## 2023-09-17 RX ADMIN — MONTELUKAST 10 MG: 10 TABLET, FILM COATED ORAL at 23:14

## 2023-09-17 RX ADMIN — NYSTATIN 500000 UNITS: 100000 SUSPENSION ORAL at 09:16

## 2023-09-17 RX ADMIN — PANTOPRAZOLE SODIUM 40 MG: 40 TABLET, DELAYED RELEASE ORAL at 05:04

## 2023-09-17 RX ADMIN — NYSTATIN 500000 UNITS: 100000 SUSPENSION ORAL at 23:14

## 2023-09-17 RX ADMIN — SODIUM CHLORIDE, PRESERVATIVE FREE 10 ML: 5 INJECTION INTRAVENOUS at 09:56

## 2023-09-17 RX ADMIN — MEGESTROL ACETATE 200 MG: 400 SUSPENSION ORAL at 09:16

## 2023-09-17 RX ADMIN — TIOTROPIUM BROMIDE INHALATION SPRAY 2 PUFF: 3.12 SPRAY, METERED RESPIRATORY (INHALATION) at 06:50

## 2023-09-17 RX ADMIN — BUMETANIDE 0.5 MG: 1 TABLET ORAL at 13:56

## 2023-09-17 RX ADMIN — BUDESONIDE AND FORMOTEROL FUMARATE DIHYDRATE 2 PUFF: 160; 4.5 AEROSOL RESPIRATORY (INHALATION) at 06:50

## 2023-09-17 RX ADMIN — SODIUM CHLORIDE, PRESERVATIVE FREE 10 ML: 5 INJECTION INTRAVENOUS at 23:15

## 2023-09-17 NOTE — PROGRESS NOTES
hospital problems.  *     Multifocal pneumonia with cavitary lung changes in upper lobes  - Continue IV antibiotics, now on meropenem as per ID  - Follow-up testing to rule out mycobacterial or fungal infection, testing negative thus far  -- Pulmonology following  - S/p bronchoscopy with BAL, follow-up microbiology and cytology studies, reviewed, no growth thus far  -- Infectious diseases following  -- Sputum culture growing pseudomonas  - AFB smear negative x2  -- Final AFB smear pending  -- Quantiferon gold pending     Acute hypoxic respiratory failure secondary to above and severe COPD  - Continue supplemental oxygen to maintain SPO2 at goal 88-92%     Acute vision changes with diplopia  - Stat CT head obtained-no acute findings  - Obtain MRI brain  - Neurochecks     Rash with lacy pattern over large areas of chest, abdomen and back with pruritus  --Continues to slowly improve  -- Likely recent allergic drug reaction  -- Benadryl for pruritus  - Finish prednisone taper     Hypokalemia  Hypomagnesemia  - Monitor and replete electrolytes as indicated     COPD  - Recently treated for COPD exacerbation during recent hospitalization  - Monitor SPO2, supplemental, as needed to maintain SPO2 88-92%  - Pulmonary toileting  - Bronchodilators     PATEL, prerenal  Dehydration  --- Resolved with IV fluids     Moderate pleural effusion on most recent chest x-ray, elevated proBNP, peripheral edema  - Bumex 0.5 mg daily     Malnutrition  Lack of appetite  - Recently started on Megace outpatient, will continue  -- Nutritional support     Thrush  - Nystatin swish and swallow     Paroxysmal SVT  - Remains in normal sinus rhythm  - Continue on metoprolol succinate     Debility  Continue PT/OT     Multiple complex medical problems  Morbidity mortality high risk  Medical decision making high complexity     Status and plan of care discussed with nursing staff      DVT prophylaxis: Lovenox    DISPOSITION:  Possibly ready for discharge negative. Would transition to PO antibiotics. Plan is for home health, family not interested in SNF placement.      Full Code No additional code details        Bolivar Goetz MD 9/17/2023 12:00 PM

## 2023-09-18 ENCOUNTER — OUTSIDE FACILITY SERVICE (OUTPATIENT)
Age: 73
End: 2023-09-18
Payer: MEDICARE

## 2023-09-18 LAB
ACID FAST STN SPEC QL: NORMAL
ACID FAST STN SPEC QL: NORMAL
BACTERIA SPEC RESP CULT: ABNORMAL
BACTERIA SPEC RESP CULT: ABNORMAL
GAMMA INTERFERON BACKGROUND BLD IA-ACNC: 0.03 IU/ML
GRAM STN SPEC: ABNORMAL
MITOGEN IGNF BCKGRD COR BLD-ACNC: 2.04 IU/ML
ORGANISM: ABNORMAL
QUANTI TB GOLD PLUS: NEGATIVE
QUANTI TB1 MINUS NIL: 0 IU/ML (ref 0–0.34)
QUANTI TB2 MINUS NIL: 0 IU/ML (ref 0–0.34)

## 2023-09-18 PROCEDURE — 6370000000 HC RX 637 (ALT 250 FOR IP): Performed by: INTERNAL MEDICINE

## 2023-09-18 PROCEDURE — 97535 SELF CARE MNGMENT TRAINING: CPT

## 2023-09-18 PROCEDURE — 94760 N-INVAS EAR/PLS OXIMETRY 1: CPT

## 2023-09-18 PROCEDURE — 1210000000 HC MED SURG R&B

## 2023-09-18 PROCEDURE — 94640 AIRWAY INHALATION TREATMENT: CPT

## 2023-09-18 PROCEDURE — 2580000003 HC RX 258: Performed by: INTERNAL MEDICINE

## 2023-09-18 PROCEDURE — 6370000000 HC RX 637 (ALT 250 FOR IP): Performed by: STUDENT IN AN ORGANIZED HEALTH CARE EDUCATION/TRAINING PROGRAM

## 2023-09-18 PROCEDURE — 2700000000 HC OXYGEN THERAPY PER DAY

## 2023-09-18 PROCEDURE — 6360000002 HC RX W HCPCS: Performed by: INTERNAL MEDICINE

## 2023-09-18 PROCEDURE — 97116 GAIT TRAINING THERAPY: CPT

## 2023-09-18 RX ADMIN — BETAMETHASONE DIPROPIONATE: 0.5 CREAM TOPICAL at 09:28

## 2023-09-18 RX ADMIN — MEROPENEM 1000 MG: 1 INJECTION, POWDER, FOR SOLUTION INTRAVENOUS at 14:02

## 2023-09-18 RX ADMIN — MEGESTROL ACETATE 200 MG: 400 SUSPENSION ORAL at 09:30

## 2023-09-18 RX ADMIN — METOPROLOL SUCCINATE 200 MG: 50 TABLET, EXTENDED RELEASE ORAL at 09:28

## 2023-09-18 RX ADMIN — MEROPENEM 1000 MG: 1 INJECTION, POWDER, FOR SOLUTION INTRAVENOUS at 05:50

## 2023-09-18 RX ADMIN — BUMETANIDE 0.5 MG: 1 TABLET ORAL at 09:30

## 2023-09-18 RX ADMIN — SODIUM CHLORIDE, PRESERVATIVE FREE 10 ML: 5 INJECTION INTRAVENOUS at 20:18

## 2023-09-18 RX ADMIN — MONTELUKAST 10 MG: 10 TABLET, FILM COATED ORAL at 20:14

## 2023-09-18 RX ADMIN — PREDNISONE 10 MG: 10 TABLET ORAL at 09:29

## 2023-09-18 RX ADMIN — NYSTATIN 500000 UNITS: 100000 SUSPENSION ORAL at 14:05

## 2023-09-18 RX ADMIN — NYSTATIN 500000 UNITS: 100000 SUSPENSION ORAL at 20:14

## 2023-09-18 RX ADMIN — NYSTATIN 500000 UNITS: 100000 SUSPENSION ORAL at 09:29

## 2023-09-18 RX ADMIN — TIOTROPIUM BROMIDE INHALATION SPRAY 2 PUFF: 3.12 SPRAY, METERED RESPIRATORY (INHALATION) at 07:24

## 2023-09-18 RX ADMIN — MEROPENEM 1000 MG: 1 INJECTION, POWDER, FOR SOLUTION INTRAVENOUS at 21:29

## 2023-09-18 RX ADMIN — ASPIRIN 81 MG: 81 TABLET, CHEWABLE ORAL at 09:28

## 2023-09-18 RX ADMIN — BUDESONIDE AND FORMOTEROL FUMARATE DIHYDRATE 2 PUFF: 160; 4.5 AEROSOL RESPIRATORY (INHALATION) at 07:24

## 2023-09-18 RX ADMIN — SODIUM CHLORIDE, PRESERVATIVE FREE 10 ML: 5 INJECTION INTRAVENOUS at 10:48

## 2023-09-18 RX ADMIN — NYSTATIN 500000 UNITS: 100000 SUSPENSION ORAL at 18:03

## 2023-09-18 RX ADMIN — BETAMETHASONE DIPROPIONATE: 0.5 CREAM TOPICAL at 20:17

## 2023-09-18 RX ADMIN — PANTOPRAZOLE SODIUM 40 MG: 40 TABLET, DELAYED RELEASE ORAL at 05:51

## 2023-09-18 RX ADMIN — BUDESONIDE AND FORMOTEROL FUMARATE DIHYDRATE 2 PUFF: 160; 4.5 AEROSOL RESPIRATORY (INHALATION) at 18:41

## 2023-09-18 RX ADMIN — ENOXAPARIN SODIUM 40 MG: 100 INJECTION SUBCUTANEOUS at 09:30

## 2023-09-18 NOTE — PROGRESS NOTES
Physical Therapy  Name: Regulo Morataya  MRN:  [id number]  Date of service:  9/18/2023 09/18/23 1156   Subjective   Subjective Pt in bed, agreeable to therapy. Pain Assessment   Pain Assessment None - Denies Pain   Oxygen Therapy   O2 Device Nasal cannula   O2 Flow Rate (L/min) 2 L/min   Bed Mobility   Supine to Sit Stand by assistance   Transfers   Sit to Stand Contact guard assistance   Stand to Sit Contact guard assistance   Ambulation   Surface Level tile   Device Rolling Walker   Other Apparatus O2   Assistance Contact guard assistance   Gait Deviations Slow Mercy;Decreased step length;Decreased step height   Distance 40'   Short Term Goals   Time Frame for Short Term Goals 2 WKS   Short Term Goal 1  FT WITH RW, SBA   Conditions Requiring Skilled Therapeutic Intervention   Body Structures, Functions, Activity Limitations Requiring Skilled Therapeutic Intervention Decreased functional mobility ; Decreased endurance;Decreased posture   Assessment Pt tolerated short gait distance in room well with rwx, steady overall with no LOB and no SOA noted. Pt up to the chair with all needs in reach following tx. Activity Tolerance   Activity Tolerance Patient tolerated treatment well   PT Plan of Care   Monday X   Safety Devices   Type of Devices Call light within reach; Chair alarm in place;Gait belt;Left in chair           Electronically signed by Anny Hunter PTA on 9/18/2023 at 11:58 AM

## 2023-09-18 NOTE — PROGRESS NOTES
Daily Progress Note    Date:2023  Patient: Rene Matos  : 1950  UYY:[id number]  CODE:Full Code No additional code details  PCP:ALEJANDRA Chamorro - JEROMY    Admit Date: 2023  2:33 PM   LOS: 10 days     Chief Complaint   Patient presents with    Abnormal Lab     Low potassium, sent by [facility] at [facility]         Subjective: Gwenlyn Hernandez. Feeling better overall. O2 requirements remain at 2L NC. No fevers or chills. Hospital Summary: 59-year-old female with severe COPD, longtime tobacco abuse, recently hospitalized with pneumonia and COPD exacerbation treated with course of antibiotics and steroids, considered for SNF due to generalized weakness but declined, discharged home with home health on , subsequent presented back on  with ongoing weakness, poor p.o. intake, sent in with abnormal outpatient labs including hypokalemia with potassium down to 2.2, BUN 1.1, also with PATEL with creatinine significant from baseline. She continues to have ongoing cough and dyspnea with minimal exertion. Since being discharged she also developed a scattered erythematous rash over her chest and back with pruritus. Repeat chest x-ray showed interval worsening of bilateral upper lobe infiltrates. CT chest was obtained to further evaluate which showed multifocal bilateral upper lobe pneumonia with some cavitary lung changes in the left upper lobe. Treated with broad-spectrum IV antibiotics. Further work-up initiated to rule out mycobacterial or fungal infection. Pulmonology consulted-underwent bronchoscopy with BAL on   AFB culture negative. Renal insufficiency, dehydration improved with fluid hydration. However later in hospitalization subsequently diuresed due to significant peripheral edema, noted proBNP and pleural effusion.  ID consulted, awaiting results of AFB cultures. 9/15 patient developed some diplopia, CT head with no acute findings.    awaiting AFB culture and Quantiferon gold health. Discharge is pending antibiotic recommendations. There may not be a PO option to discharge on.      Full Code No additional code details        Bolivar Goetz MD 9/18/2023 12:19 PM

## 2023-09-18 NOTE — PROGRESS NOTES
Occupational Therapy  Facility/Department: [facility] SUR[facility] SERVICES  Daily Treatment Note  NAME: Ashely Card  : 1950  MRN: [id number]    Date of Service: 2023    Discharge Recommendations:  Patient would benefit from continued therapy after discharge       Assessment   Performance deficits / Impairments: Decreased functional mobility ; Decreased balance;Decreased endurance;Decreased ADL status  Assessment: Pt tolerated tx well. Pt continues to benefit from skilled OT services. Treatment Diagnosis: Pneumonia, Hypoxia  Prognosis: Good  Activity Tolerance  Activity Tolerance: Patient Tolerated treatment well         Patient Diagnosis(es): The primary encounter diagnosis was Hypokalemia. Diagnoses of Hypomagnesemia, Decreased oral intake, History of recent pneumonia, and Pneumonia were also pertinent to this visit. has a past medical history of COPD (chronic obstructive pulmonary disease) ([address]), GERD (gastroesophageal reflux disease), and Hypertension. has a past surgical history that includes Gallbladder surgery; Hysterectomy, total abdominal; Tonsillectomy and adenoidectomy; Carpal tunnel release (Right); Ovary removal; central line (2023); and bronchoscopy (Bilateral, 2023). Restrictions     Subjective   General  Chart Reviewed: Yes  Patient assessed for rehabilitation services?: Yes  Response to previous treatment: Patient with no complaints from previous session  Family / Caregiver Present: No  Diagnosis: Pneumonia, Hypoxia  General Comment  Comments: .   Pre Treatment Pain Screening  Pain at present: 0   Orientation     Objective    ADL  LE Dressing: Supervision           Bed mobility  Supine to Sit: Stand by assistance  Transfers  Stand Step Transfers: Contact guard assistance  Sit to stand: Contact guard assistance  Stand to sit: Contact guard assistance                                                           Plan   Occupational Therapy Plan  Times Per Week: 3-5x/week  Times Per Day:

## 2023-09-19 VITALS
WEIGHT: 159.9 LBS | RESPIRATION RATE: 16 BRPM | HEART RATE: 81 BPM | HEIGHT: 61 IN | OXYGEN SATURATION: 97 % | BODY MASS INDEX: 30.19 KG/M2 | DIASTOLIC BLOOD PRESSURE: 61 MMHG | SYSTOLIC BLOOD PRESSURE: 115 MMHG | TEMPERATURE: 97.9 F

## 2023-09-19 LAB
ANION GAP SERPL CALCULATED.3IONS-SCNC: 9 MMOL/L (ref 7–19)
BASOPHILS # BLD: 0 K/UL (ref 0–0.2)
BASOPHILS NFR BLD: 0.3 % (ref 0–1)
BUN SERPL-MCNC: 15 MG/DL (ref 8–23)
CALCIUM SERPL-MCNC: 8.1 MG/DL (ref 8.8–10.2)
CHLORIDE SERPL-SCNC: 100 MMOL/L (ref 98–111)
CO2 SERPL-SCNC: 29 MMOL/L (ref 22–29)
CREAT SERPL-MCNC: 0.8 MG/DL (ref 0.5–0.9)
EOSINOPHIL # BLD: 0.4 K/UL (ref 0–0.6)
EOSINOPHIL NFR BLD: 5.3 % (ref 0–5)
ERYTHROCYTE [DISTWIDTH] IN BLOOD BY AUTOMATED COUNT: 15.9 % (ref 11.5–14.5)
GLUCOSE SERPL-MCNC: 91 MG/DL (ref 74–109)
HCT VFR BLD AUTO: 24.7 % (ref 37–47)
HGB BLD-MCNC: 8.1 G/DL (ref 12–16)
IMM GRANULOCYTES # BLD: 0.1 K/UL
LYMPHOCYTES # BLD: 1.8 K/UL (ref 1.1–4.5)
LYMPHOCYTES NFR BLD: 24.5 % (ref 20–40)
MAGNESIUM SERPL-MCNC: 1.8 MG/DL (ref 1.6–2.4)
MCH RBC QN AUTO: 32.4 PG (ref 27–31)
MCHC RBC AUTO-ENTMCNC: 32.8 G/DL (ref 33–37)
MCV RBC AUTO: 98.8 FL (ref 81–99)
MONOCYTES # BLD: 0.8 K/UL (ref 0–0.9)
MONOCYTES NFR BLD: 10.2 % (ref 0–10)
NEUTROPHILS # BLD: 4.4 K/UL (ref 1.5–7.5)
NEUTS SEG NFR BLD: 58.2 % (ref 50–65)
PLATELET # BLD AUTO: 408 K/UL (ref 130–400)
PMV BLD AUTO: 9.6 FL (ref 9.4–12.3)
POTASSIUM SERPL-SCNC: 3 MMOL/L (ref 3.5–5)
RBC # BLD AUTO: 2.5 M/UL (ref 4.2–5.4)
SODIUM SERPL-SCNC: 138 MMOL/L (ref 136–145)
WBC # BLD AUTO: 7.5 K/UL (ref 4.8–10.8)

## 2023-09-19 PROCEDURE — 2580000003 HC RX 258: Performed by: INTERNAL MEDICINE

## 2023-09-19 PROCEDURE — 6370000000 HC RX 637 (ALT 250 FOR IP): Performed by: STUDENT IN AN ORGANIZED HEALTH CARE EDUCATION/TRAINING PROGRAM

## 2023-09-19 PROCEDURE — 6360000002 HC RX W HCPCS: Performed by: INTERNAL MEDICINE

## 2023-09-19 PROCEDURE — 6370000000 HC RX 637 (ALT 250 FOR IP): Performed by: INTERNAL MEDICINE

## 2023-09-19 PROCEDURE — 80048 BASIC METABOLIC PNL TOTAL CA: CPT

## 2023-09-19 PROCEDURE — 97116 GAIT TRAINING THERAPY: CPT

## 2023-09-19 PROCEDURE — 94640 AIRWAY INHALATION TREATMENT: CPT

## 2023-09-19 PROCEDURE — 97110 THERAPEUTIC EXERCISES: CPT

## 2023-09-19 PROCEDURE — 83735 ASSAY OF MAGNESIUM: CPT

## 2023-09-19 PROCEDURE — 6360000002 HC RX W HCPCS: Performed by: STUDENT IN AN ORGANIZED HEALTH CARE EDUCATION/TRAINING PROGRAM

## 2023-09-19 PROCEDURE — 85025 COMPLETE CBC W/AUTO DIFF WBC: CPT

## 2023-09-19 PROCEDURE — 94760 N-INVAS EAR/PLS OXIMETRY 1: CPT

## 2023-09-19 RX ORDER — LEVOFLOXACIN 750 MG/1
750 TABLET ORAL DAILY
Qty: 7 TABLET | Refills: 0 | Status: SHIPPED | OUTPATIENT
Start: 2023-09-19 | End: 2023-09-26

## 2023-09-19 RX ORDER — MAGNESIUM SULFATE 1 G/100ML
1000 INJECTION INTRAVENOUS ONCE
Status: COMPLETED | OUTPATIENT
Start: 2023-09-19 | End: 2023-09-19

## 2023-09-19 RX ORDER — POTASSIUM CHLORIDE 20 MEQ/1
40 TABLET, EXTENDED RELEASE ORAL EVERY 4 HOURS
Status: COMPLETED | OUTPATIENT
Start: 2023-09-19 | End: 2023-09-19

## 2023-09-19 RX ORDER — BUMETANIDE 0.5 MG/1
0.5 TABLET ORAL DAILY
Qty: 7 TABLET | Refills: 0 | Status: SHIPPED | OUTPATIENT
Start: 2023-09-20

## 2023-09-19 RX ORDER — PREDNISONE 5 MG/1
5 TABLET ORAL DAILY
Qty: 2 TABLET | Refills: 0 | Status: SHIPPED | OUTPATIENT
Start: 2023-09-20 | End: 2023-09-22

## 2023-09-19 RX ORDER — LEVOFLOXACIN 500 MG/1
750 TABLET, FILM COATED ORAL DAILY
Status: DISCONTINUED | OUTPATIENT
Start: 2023-09-19 | End: 2023-09-19 | Stop reason: HOSPADM

## 2023-09-19 RX ORDER — POTASSIUM CHLORIDE 750 MG/1
20 TABLET, EXTENDED RELEASE ORAL DAILY
Qty: 14 TABLET | Refills: 0 | Status: SHIPPED | OUTPATIENT
Start: 2023-09-19

## 2023-09-19 RX ADMIN — MEGESTROL ACETATE 200 MG: 400 SUSPENSION ORAL at 08:46

## 2023-09-19 RX ADMIN — NYSTATIN 500000 UNITS: 100000 SUSPENSION ORAL at 08:46

## 2023-09-19 RX ADMIN — MEROPENEM 1000 MG: 1 INJECTION, POWDER, FOR SOLUTION INTRAVENOUS at 05:13

## 2023-09-19 RX ADMIN — PANTOPRAZOLE SODIUM 40 MG: 40 TABLET, DELAYED RELEASE ORAL at 05:10

## 2023-09-19 RX ADMIN — TIOTROPIUM BROMIDE INHALATION SPRAY 2 PUFF: 3.12 SPRAY, METERED RESPIRATORY (INHALATION) at 07:27

## 2023-09-19 RX ADMIN — ENOXAPARIN SODIUM 40 MG: 100 INJECTION SUBCUTANEOUS at 08:46

## 2023-09-19 RX ADMIN — POTASSIUM CHLORIDE 40 MEQ: 1500 TABLET, EXTENDED RELEASE ORAL at 12:10

## 2023-09-19 RX ADMIN — BUDESONIDE AND FORMOTEROL FUMARATE DIHYDRATE 2 PUFF: 160; 4.5 AEROSOL RESPIRATORY (INHALATION) at 07:27

## 2023-09-19 RX ADMIN — PREDNISONE 5 MG: 5 TABLET ORAL at 08:46

## 2023-09-19 RX ADMIN — POTASSIUM CHLORIDE 40 MEQ: 1500 TABLET, EXTENDED RELEASE ORAL at 08:46

## 2023-09-19 RX ADMIN — BUMETANIDE 0.5 MG: 1 TABLET ORAL at 08:46

## 2023-09-19 RX ADMIN — MAGNESIUM SULFATE HEPTAHYDRATE 1000 MG: 1 INJECTION, SOLUTION INTRAVENOUS at 08:47

## 2023-09-19 RX ADMIN — LEVOFLOXACIN 750 MG: 500 TABLET, FILM COATED ORAL at 12:09

## 2023-09-19 RX ADMIN — METOPROLOL SUCCINATE 200 MG: 50 TABLET, EXTENDED RELEASE ORAL at 08:46

## 2023-09-19 RX ADMIN — ASPIRIN 81 MG: 81 TABLET, CHEWABLE ORAL at 08:46

## 2023-09-19 NOTE — PROGRESS NOTES
Physical Therapy  Name: Chanda Lennox  MRN:  [id number]  Date of service:  9/19/2023 09/19/23 0910   Subjective   Subjective Pt agreed to therapy, ready to get out of bed. Pain Assessment   Pain Assessment None - Denies Pain   Bed Mobility   Supine to Sit Stand by assistance   Transfers   Sit to Stand Contact guard assistance   Stand to Sit Contact guard assistance   Ambulation   Surface Level tile   Device Rolling Walker   Other Apparatus O2   Assistance Contact guard assistance   Gait Deviations Slow Mercy;Decreased step length;Decreased step height   Distance 40'   Exercises   Hip Flexion x15   Hip Abduction x15   Knee Long Arc Quad x15   Ankle Pumps x15   Comments BLE sitting exercise   Short Term Goals   Time Frame for Short Term Goals 2 WKS   Short Term Goal 1  FT WITH RW, SBA   Conditions Requiring Skilled Therapeutic Intervention   Body Structures, Functions, Activity Limitations Requiring Skilled Therapeutic Intervention Decreased functional mobility ; Decreased endurance;Decreased posture   Assessment Pt able to amb short distance in room, did not amb into morse due to isolation precautions. Tolerated increased repetitions of exercise without SOA. Assisted up to chair with all needs in reach. Activity Tolerance   Activity Tolerance Patient tolerated treatment well   PT Plan of Care   Tuesday X   Safety Devices   Type of Devices Call light within reach; Chair alarm in place; Left in chair           Electronically signed by Denny Aleman PTA on 9/19/2023 at 9:20 AM

## 2023-09-19 NOTE — CARE COORDINATION
[facility] notified of patient discharge today. DC Summary and DC med list faxed.   Electronically signed by Dhiraj Conklin on 9/19/23 at 12:48 PM CDT

## 2023-09-19 NOTE — PROGRESS NOTES
Comprehensive Nutrition Assessment    Type and Reason for Visit:  Reassess    Nutrition Recommendations/Plan:   Continue POC     Nutrition Assessment:    Pt is improving from a nutritional standpoint. PO intake of meals is variable. She consumes about 55% of her meals on average, which meets about 85% of her nutritional needs. She continues on Megace for appetite and Ensure ONS BID to help meet nutritional needs. Last BM noted 9/16. Continue POC. Nutrition Related Findings:    +2 pitting RLE edema. +1 pitting LLE edema. Current Nutrition Intake & Therapies:    Average Meal Intake: 51-75%  ADULT DIET; Regular  ADULT ORAL NUTRITION SUPPLEMENT; Breakfast, Lunch, Dinner; Standard High Calorie/High Protein Oral Supplement    Anthropometric Measures:  Height: 5' 1\" (154.9 cm)  Ideal Body Weight (IBW): 105 lbs (48 kg)    Admission Body Weight: 154 lb (69.9 kg)  Current Body Weight: 159 lb (72.1 kg), 149.5 % IBW. Current BMI (kg/m2): 30.1  Usual Body Weight: 151 lb 1.6 oz (68.5 kg) (6/2023)  % Weight Change (Calculated): 3.9  BMI Categories: Obese Class 1 (BMI 30.0-34. 9)    Estimated Daily Nutrient Needs:  Energy Requirements Based On: Kcal/kg  Weight Used for Energy Requirements: Current  Energy (kcal/day): 0276-7592 kcals/day  Weight Used for Protein Requirements: Ideal  Protein (g/day): 57-96 g/protein/day  Method Used for Fluid Requirements: 1 ml/kcal  Fluid (ml/day): 1437-2587 mL/day    Nutrition Diagnosis:   No nutrition diagnosis at this time    Nutrition Interventions:   Food and/or Nutrient Delivery: Continue Current Diet, Continue Oral Nutrition Supplement  Coordination of Nutrition Care: Continue to monitor while inpatient    Goals:  Previous Goal Met: Goal(s) Achieved  Goals: PO intake 75% or greater    Nutrition Monitoring and Evaluation:   Food/Nutrient Intake Outcomes: Food and Nutrient Intake, Supplement Intake  Physical Signs/Symptoms Outcomes: Biochemical Data, Nausea or Vomiting, Fluid Status

## 2023-09-19 NOTE — PROGRESS NOTES
Home health notified that patient will dc today. Follow up appointment made with pulmonology and CT. Prescriptions are being delivered by meds to beds. CVC will be discontinued prior to discharge.

## 2023-09-19 NOTE — PROGRESS NOTES
CLINICAL PHARMACY NOTE: MEDS TO BEDS    Total # of Prescriptions Filled: 4   The following medications were delivered to the patient:  Current Discharge Medication List        START taking these medications    Details   predniSONE (DELTASONE) 5 MG tablet Take 1 tablet by mouth daily for 2 doses  Qty: 2 tablet, Refills: 0      bumetanide (BUMEX) 0.5 MG tablet Take 1 tablet by mouth daily  Qty: 7 tablet, Refills: 0      levoFLOXacin (LEVAQUIN) 750 MG tablet Take 1 tablet by mouth daily for 7 doses  Qty: 7 tablet, Refills: 0      potassium chloride (KLOR-CON M) 10 MEQ extended release tablet Take 2 tablets by mouth daily  Qty: 14 tablet, Refills: 0               Additional Documentation:    Delivered RX's to patient bedside. Paid with cash.

## 2023-09-20 LAB
ACID FAST STN SPEC QL: NORMAL
MYCOBACTERIUM SPEC CULT: NORMAL

## 2023-09-21 LAB
Lab: NORMAL
REPORT: NORMAL
THIS TEST SENT TO: NORMAL

## 2023-09-25 NOTE — PROGRESS NOTES
Physician Progress Note      Maxi Hall  CSN #:                  [id number]  :                       1950  ADMIT DATE:       2023 2:33 PM  [facility] DATE:        2023 2:45 PM  RESPONDING  PROVIDER #:        Emily Cordero MD          QUERY TEXT:    Patient admitted with Pneumonia. Documentation reflects Sepsis in [facility]. If   possible, please document in the progress notes and discharge summary if   Sepsis was: The medical record reflects the following:  Risk Factors: Pneumonia, PATEL  Clinical Indicators:  WBC 17.2, PNA, PATEL, lactic acid 1.6,  Treatment: Levaquin 750 mg IVPB, Merrem 1,000 mg IVPB, Vancomycin 1750mg IVPB,    Thank you  Sivan Ware RN, BSN, [facility]  [phone]  Options provided:  -- Sepsis confirmed after study  -- Sepsis treated and resolved  -- Sepsis ruled out after study  -- Other - I will add my own diagnosis  -- Disagree - Not applicable / Not valid  -- Disagree - Clinically unable to determine / Unknown  -- Refer to Clinical Documentation Reviewer    PROVIDER RESPONSE TEXT:    Sepsis ruled out after study.     Query created by: Sivan Ware on 2023 10:22 AM      Electronically signed by:  Emily Cordero MD 2023 10:06 PM

## 2023-09-26 LAB
FUNGUS SPEC CULT: NORMAL
KOH PREP SPEC: NORMAL

## 2023-09-27 LAB
ACID FAST STN SPEC QL: NORMAL
MYCOBACTERIUM SPEC CULT: NORMAL

## 2023-10-03 LAB
FUNGUS SPEC CULT: NORMAL
KOH PREP SPEC: NORMAL

## 2023-10-04 LAB
ACID FAST STN SPEC QL: NORMAL
MYCOBACTERIUM SPEC CULT: NORMAL

## 2023-10-10 LAB
FUNGUS SPEC CULT: NORMAL
KOH PREP SPEC: NORMAL

## 2023-10-11 LAB
ACID FAST STN SPEC QL: NORMAL
MYCOBACTERIUM SPEC CULT: NORMAL

## 2023-10-17 LAB
FUNGUS SPEC CULT: NORMAL
KOH PREP SPEC: NORMAL

## 2023-10-18 LAB
ACID FAST STN SPEC QL: NORMAL
MYCOBACTERIUM SPEC CULT: NORMAL

## 2023-10-24 ENCOUNTER — TELEPHONE (OUTPATIENT)
Age: 73
End: 2023-10-24
Payer: MEDICARE

## 2023-10-24 LAB
ACID FAST STN SPEC QL: ABNORMAL
MYCOBACTERIUM SPEC CULT: ABNORMAL
ORGANISM: ABNORMAL

## 2023-10-24 NOTE — TELEPHONE ENCOUNTER
[facility] culture collected while inpatient on [facility]/16/23.  She will fax over the report.      Dr. Galindo was notified of positive AFB in office today and said to send to [facility] for ID.  He said to contact patient and offer her a follow-up appt.  She should have follow-up with Dr. Rodney.  Fax positive AFB to Dr. Rodney's office.      I called [facility] back, spoke with Alejandra and she confirmed AFB will be sent to [facility] for ID.      I called patient to see how she is doing.  She said she is \"doing very well I think.\"  I informed her that one of her AFB culture collected while in the hospital is positive for AFB.  This AFB will be send to [facility] for ID.  Dr. Galindo asked me to call and offer her an appt.  She said if he thinks she needs one, she will schedule.  I informed her that according to [facility] CHAIDE[facility], she has a CT scheduled on 11/1/23 and an appt with Dr. Rodney (lung doctor) on 11/8/23 at 10 AM.  She was not aware of these appts.  She said OK to schedule 2 appts in one day on Wed 11/8/23.  Appt with Dr. Galindo scheduled 11/8/23 at 1130.    "

## 2023-10-25 LAB
ACID FAST STN SPEC QL: ABNORMAL
ACID FAST STN SPEC QL: NORMAL
ACID FAST STN SPEC QL: NORMAL
MYCOBACTERIUM SPEC CULT: ABNORMAL
MYCOBACTERIUM SPEC CULT: NORMAL
MYCOBACTERIUM SPEC CULT: NORMAL
ORGANISM: ABNORMAL

## 2023-10-31 LAB
ACID FAST STN SPEC QL: ABNORMAL
MYCOBACTERIUM SPEC CULT: ABNORMAL
ORGANISM: ABNORMAL

## 2023-11-01 ENCOUNTER — HOSPITAL ENCOUNTER (OUTPATIENT)
Dept: CT IMAGING | Age: 73
Discharge: HOME | End: 2023-11-01
Payer: MEDICARE

## 2023-11-01 DIAGNOSIS — J18.9 PNEUMONIA OF BOTH UPPER LOBES DUE TO INFECTIOUS ORGANISM: ICD-10-CM

## 2023-11-01 LAB
ACID FAST STN SPEC QL: NORMAL
MYCOBACTERIUM SPEC CULT: NORMAL

## 2023-11-01 PROCEDURE — 71250 CT THORAX DX C-: CPT

## 2023-11-08 ENCOUNTER — OFFICE VISIT (OUTPATIENT)
Dept: PULMONOLOGY | Age: 73
End: 2023-11-08
Payer: MEDICARE

## 2023-11-08 ENCOUNTER — OFFICE VISIT (OUTPATIENT)
Age: 73
End: 2023-11-08
Payer: MEDICARE

## 2023-11-08 VITALS
HEIGHT: 61 IN | BODY MASS INDEX: 27 KG/M2 | WEIGHT: 143 LBS | TEMPERATURE: 98.3 F | SYSTOLIC BLOOD PRESSURE: 170 MMHG | DIASTOLIC BLOOD PRESSURE: 89 MMHG | HEART RATE: 88 BPM | OXYGEN SATURATION: 99 %

## 2023-11-08 VITALS
BODY MASS INDEX: 26.85 KG/M2 | OXYGEN SATURATION: 100 % | HEIGHT: 61 IN | TEMPERATURE: 98.7 F | SYSTOLIC BLOOD PRESSURE: 150 MMHG | DIASTOLIC BLOOD PRESSURE: 74 MMHG | WEIGHT: 142.2 LBS | HEART RATE: 81 BPM

## 2023-11-08 DIAGNOSIS — Z87.891 HISTORY OF SMOKING: ICD-10-CM

## 2023-11-08 DIAGNOSIS — J18.1 CONSOLIDATION OF LEFT LOWER LOBE OF LUNG: Primary | ICD-10-CM

## 2023-11-08 DIAGNOSIS — R05.3 CHRONIC COUGH: ICD-10-CM

## 2023-11-08 DIAGNOSIS — J43.1 PANLOBULAR EMPHYSEMA (HCC): ICD-10-CM

## 2023-11-08 DIAGNOSIS — R06.02 SHORTNESS OF BREATH: ICD-10-CM

## 2023-11-08 DIAGNOSIS — A31.9 MYCOBACTERIUM GORDONAE INFECTION: Primary | ICD-10-CM

## 2023-11-08 PROBLEM — A31.8 MYCOBACTERIUM GORDONAE INFECTION: Status: ACTIVE | Noted: 2023-11-08

## 2023-11-08 PROCEDURE — 3079F DIAST BP 80-89 MM HG: CPT | Performed by: INTERNAL MEDICINE

## 2023-11-08 PROCEDURE — 1123F ACP DISCUSS/DSCN MKR DOCD: CPT | Performed by: INTERNAL MEDICINE

## 2023-11-08 PROCEDURE — 3077F SYST BP >= 140 MM HG: CPT | Performed by: INTERNAL MEDICINE

## 2023-11-08 PROCEDURE — 99214 OFFICE O/P EST MOD 30 MIN: CPT | Performed by: INTERNAL MEDICINE

## 2023-11-08 RX ORDER — ROPINIROLE 1 MG/1
1 TABLET, FILM COATED ORAL NIGHTLY PRN
COMMUNITY
Start: 2023-11-01

## 2023-11-08 RX ORDER — HYDROCHLOROTHIAZIDE 12.5 MG/1
CAPSULE, GELATIN COATED ORAL
COMMUNITY
Start: 2023-11-01

## 2023-11-08 ASSESSMENT — ENCOUNTER SYMPTOMS
RHINORRHEA: 0
SHORTNESS OF BREATH: 0
CHEST TIGHTNESS: 0
ABDOMINAL DISTENTION: 0
APNEA: 0
WHEEZING: 0
ABDOMINAL PAIN: 0
BACK PAIN: 0
ANAL BLEEDING: 0
COUGH: 1

## 2023-11-08 NOTE — PROGRESS NOTES
[facility] - Infectious Diseases Progress Note    Patient:  Deanna Brown  YOB: 1950  MRN: [id number]   Primary Care Physician: Marisa Mas APRN  Referring Physician: No ref. provider found     Chief Complaint:   Chief Complaint   Patient presents with    Pneumonia     Bilateral upper lobe, some cystic and cavitary with complaints of chest irritation and dry cough     Interval History/HPI: She returns today for follow-up of bilateral upper lobe pneumonia some of which were cystic and cavitary.  She also has a left lower lobe masslike consolidation.  I last saw her in consultation at [facility].  She had undergone bronchoscopy during that admission.  She had AFB grow from a [facility] culture.  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  She did receive levofloxacin for 7 days at discharge.  She has ongoing follow-up with pulmonary.  She saw Dr. Rodney earlier today.  He has her scheduled for a follow-up CT scan in 6 months.  She has minimal cough at present.  She had smoked up until August.  She is not having productive cough.  No pleuritic pain.  No anginal type chest pain.  She reports stable appetite.  No weight loss.  No fevers, chills, or night sweats.    Allergies:   Allergies   Allergen Reactions    Atorvastatin Myalgia     Joints ache    Penicillins Rash     Current Scheduled Medications:   Current Outpatient Medications on File Prior to Visit   Medication Sig    albuterol sulfate  (90 Base) MCG/ACT inhaler Inhale 2 puffs Every 6 (Six) Hours As Needed.    ASPIRIN 81 PO Take 1 tablet by mouth Daily.    esomeprazole (nexIUM) 40 MG capsule Take 1 capsule by mouth Every Morning Before Breakfast.    hydroCHLOROthiazide (MICROZIDE) 12.5 MG capsule Take 1 capsule by mouth Every Morning.    losartan (COZAAR) 100 MG tablet Take 1 tablet by mouth Daily.    montelukast (SINGULAIR) 10 MG tablet Take 1 tablet by mouth Every Night.    rOPINIRole (REQUIP) 1 MG tablet Take 1  "tablet by mouth At Night As Needed.    dilTIAZem CD (CARDIZEM CD) 120 MG 24 hr capsule Take 1 capsule by mouth Daily. (Patient not taking: Reported on 11/8/2023)    nebivolol (BYSTOLIC) 20 MG tablet Take 1 tablet by mouth 2 (Two) Times a Day. (Patient not taking: Reported on 11/8/2023)    Trelegy Ellipta 100-62.5-25 MCG/ACT inhaler Inhale 1 puff Daily. (Patient not taking: Reported on 11/8/2023)     No current facility-administered medications on file prior to visit.      Venous Access Review  Line/IV site: No current IV Access    Antimicrobial Review  Currently on antibiotics/antifungals: YES/NO: NO  Start Date of Therapy: Levofloxacin 9/19/2023  If therapy completed, date complete: 9/26/2023    Review of Systems See HPI.    Vital Signs:  /74   Pulse 81   Temp 98.7 °F (37.1 °C) (Temporal)   Ht 154.9 cm (61\")   Wt 64.5 kg (142 lb 3.2 oz)   SpO2 100%   BMI 26.87 kg/m²     Physical Exam  Vital signs - reviewed.  Lungs clear to auscultation with out crackles or wheezes  She had good airflow in both lungs  There is no cervical, supraclavicular, or axillary adenopathy    Lab/Imaging/Other Information: Reviewed updated AFB results and CT scan of the chest that was done on November 2.  Dr. Uribe's note from today reviewed as well.  She had a pulmonary aspergillus galactomannan index of 0.59.  That result is borderline positive.  AFB smear September 17, 2023 negative.  Culture Mycobacterium gordonae  AFB smear and culture September 16, 2023-negative  Separate AFB smear and culture September 16, 2023 she had no AFB but grew Mycobacterium gordonae    CT of Chest without contrast diagnostic 11/2/2023  Impression  1.  Improving left upper lobe and right sided mass-like consolidations.  2.  Left lower lobe mass-like consolidation.  3.  Scattered emphysema.  4.  Pneumobilia with prior cholecystectomy.    Impression & Recommendations:   Diagnoses and all orders for this visit:    1. Consolidation of left lower lobe " of lung (Primary)  -     XR Chest 2 View; Future    Clinically she continues to improve even though she has not been on any further antibiotic or antifungal treatment.  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  Explained to her I was concerned that she continues to have some left lower lobe masslike consolidation which has not changed much.  The left upper lobe area and right upper lobe area seem to have shown improvement.  I explained it is hard to know whether the left-sided infiltrates are all infection related with imaging lagging clinical improvement.  I again brought up other possibilities such as cancer as she has had history of tobacco use.  Reviewed the importance of keeping follow-up with pulmonary and her follow-up imaging.  I would like her to call for earlier appointment if any new or worsening symptoms in the interim.  Otherwise I would like to see her back in 6 weeks with a chest x-ray at that time.  If she has any symptoms or if x-ray shows progression we could pursue CT scan earlier.  She was comfortable with the plan we discussed.    Follow Up:   Patient Instructions   Keep follow up with Dr. Rodney in 6 weeks   Return in about 6 weeks (around 12/20/2023).  Patient was provided After Visit Summary.     Marck Galindo MD    CC: TRINITY Bowden MD

## 2023-11-08 NOTE — PROGRESS NOTES
Pulmonary and Sleep Medicine    Dylon Kuo (:  1950) is a 72 y.o. female,Established patient, here for evaluation of the following chief complaint(s):  New Patient (6 wk f/u from hospital)      Referring physician:  Linda Isaac - Np  [id number]     ASSESSMENT/PLAN:  1. Mycobacterium gordonae infection  -     CT CHEST WO CONTRAST; Future  2. Panlobular emphysema ([address])  -     Full PFT Study With Bronchodilator; Future  3. History of smoking quit 2023  4. Chronic cough  5. Shortness of breath  -     Full PFT Study With Bronchodilator; Future        Continue current management with rescue inhaler for now. We will obtain pulmonary function testing. She might require a controller. Mycobacterium gordonae infection. Does not seem to be causing significant symptoms at this time. No weight loss. No sputum production. Would consider to continue monitoring. The patient has a follow-up with infectious disease. Pulmonary function testing. Mike Stringer MD, [facility]    Return in about 6 weeks (around 2023). SUBJECTIVE/OBJECTIVE:  She is here for follow up on pneumonia after hospital discharge. She had a bronchoscopy during her hospitalization. She does have Mycobacterium gordonae on the bronchoalveolar lavage. However she has minimal cough at this time. Her cough is nonproductive. She denies weight loss. She initially lost about 10 pounds but she gained her weight back. Her appetite is pretty good. She is scheduled to see infectious disease in follow-up. Follow-up CT of the chest was reviewed and it does show improvement in the consolidation. She has not been using the Trelegy inhaler. She does not like the powder form. She does use rescue inhaler about twice a day. No pulmonary function study on file. Prior to Visit Medications    Medication Sig Taking?  Authorizing Provider   hydroCHLOROthiazide (MICROZIDE) 12.5

## 2023-11-15 ENCOUNTER — HOSPITAL ENCOUNTER (OUTPATIENT)
Dept: NON INVASIVE DIAGNOSTICS | Age: 73
Discharge: HOME | End: 2023-11-15
Payer: MEDICARE

## 2023-11-15 VITALS — BODY MASS INDEX: 27.08 KG/M2 | WEIGHT: 143.3 LBS

## 2023-11-15 DIAGNOSIS — R06.09 DOE (DYSPNEA ON EXERTION): ICD-10-CM

## 2023-11-15 DIAGNOSIS — Z82.49 FAMILY HISTORY OF CORONARY ARTERY DISEASE: ICD-10-CM

## 2023-11-15 DIAGNOSIS — R06.02 SHORTNESS OF BREATH: ICD-10-CM

## 2023-11-15 PROCEDURE — 93350 STRESS TTE ONLY: CPT

## 2023-11-15 PROCEDURE — 2580000003 HC RX 258: Performed by: INTERNAL MEDICINE

## 2023-11-15 PROCEDURE — 6360000002 HC RX W HCPCS: Performed by: INTERNAL MEDICINE

## 2023-11-15 RX ORDER — DOBUTAMINE HYDROCHLORIDE 200 MG/100ML
10-50 INJECTION INTRAVENOUS CONTINUOUS PRN
Status: DISCONTINUED | OUTPATIENT
Start: 2023-11-15 | End: 2023-11-16 | Stop reason: HOSPADM

## 2023-11-15 RX ORDER — METOPROLOL TARTRATE 5 MG/5ML
5 INJECTION INTRAVENOUS PRN
Status: DISCONTINUED | OUTPATIENT
Start: 2023-11-15 | End: 2023-11-16 | Stop reason: HOSPADM

## 2023-11-15 RX ORDER — ATROPINE SULFATE 0.1 MG/ML
1 INJECTION INTRAVENOUS PRN
Status: DISCONTINUED | OUTPATIENT
Start: 2023-11-15 | End: 2023-11-16 | Stop reason: HOSPADM

## 2023-11-15 RX ORDER — NITROGLYCERIN 0.4 MG/1
0.4 TABLET SUBLINGUAL EVERY 5 MIN PRN
Status: DISCONTINUED | OUTPATIENT
Start: 2023-11-15 | End: 2023-11-17 | Stop reason: HOSPADM

## 2023-11-15 RX ORDER — SODIUM CHLORIDE 9 MG/ML
INJECTION, SOLUTION INTRAVENOUS
Status: COMPLETED | OUTPATIENT
Start: 2023-11-15 | End: 2023-11-15

## 2023-11-15 RX ADMIN — DOBUTAMINE HYDROCHLORIDE 10 MCG/KG/MIN: 200 INJECTION INTRAVENOUS at 12:08

## 2023-11-15 RX ADMIN — SODIUM CHLORIDE: 9 INJECTION, SOLUTION INTRAVENOUS at 12:08

## 2023-11-21 RX ORDER — NEBIVOLOL 5 MG/1
5 TABLET ORAL DAILY
Qty: 30 TABLET | Refills: 1 | Status: SHIPPED | OUTPATIENT
Start: 2023-11-21

## 2023-11-27 ENCOUNTER — TELEPHONE (OUTPATIENT)
Dept: NEUROSURGERY | Age: 73
End: 2023-11-27

## 2023-11-27 NOTE — TELEPHONE ENCOUNTER
Patient was scheduled with our office tomorrow to review repeat MRI. Patient isn't scheduled until 1/4/2023. Called patient and rescheduled her for after the imaging is complete. Scheduled patient for 1/9/2024 @ 9:15am. Patient thanked me and voiced understanding.

## 2023-12-06 ENCOUNTER — TELEPHONE (OUTPATIENT)
Dept: CARDIOLOGY CLINIC | Age: 73
End: 2023-12-06

## 2023-12-06 ENCOUNTER — OFFICE VISIT (OUTPATIENT)
Dept: CARDIOLOGY CLINIC | Age: 73
End: 2023-12-06
Payer: MEDICARE

## 2023-12-06 VITALS
BODY MASS INDEX: 27.19 KG/M2 | SYSTOLIC BLOOD PRESSURE: 122 MMHG | DIASTOLIC BLOOD PRESSURE: 70 MMHG | HEART RATE: 63 BPM | HEIGHT: 61 IN | OXYGEN SATURATION: 98 % | WEIGHT: 144 LBS

## 2023-12-06 DIAGNOSIS — R94.39 POSITIVE CARDIAC STRESS TEST: Primary | ICD-10-CM

## 2023-12-06 DIAGNOSIS — Z82.49 FAMILY HISTORY OF EARLY CAD: ICD-10-CM

## 2023-12-06 PROCEDURE — 1123F ACP DISCUSS/DSCN MKR DOCD: CPT | Performed by: NURSE PRACTITIONER

## 2023-12-06 PROCEDURE — 3074F SYST BP LT 130 MM HG: CPT | Performed by: NURSE PRACTITIONER

## 2023-12-06 PROCEDURE — 99214 OFFICE O/P EST MOD 30 MIN: CPT | Performed by: NURSE PRACTITIONER

## 2023-12-06 PROCEDURE — 3078F DIAST BP <80 MM HG: CPT | Performed by: NURSE PRACTITIONER

## 2023-12-06 RX ORDER — CEFDINIR 300 MG/1
CAPSULE ORAL
COMMUNITY
Start: 2023-12-04

## 2023-12-06 NOTE — PROGRESS NOTES
edema.    Pulmonary/Chest:  Lungs clear to auscultation bilaterally without evidence of respiratory distress. She without wheezes. She without rales or ronchi. Musculoskeletal: Normal range of motion. Gait is normal.  Head is normocephalic and atraumatic. Skin: Skin is warm and dry without rash or pallor. Psychiatric:She is alert and oriented to person, place, and time. She has a normal mood and affect. Her behavior is normal. Thought content normal.       Lab Results   Component Value Date/Time    CREATININE 0.8 09/19/2023 02:04 AM    CREATININE 0.7 09/16/2023 05:30 AM    CREATININE 0.7 09/15/2023 03:40 AM    HGB 8.1 09/19/2023 02:04 AM    HGB 7.8 09/16/2023 05:30 AM    HGB 7.8 09/15/2023 03:40 AM    PROBNP 2,733 09/09/2023 07:29 AM    PROBNP 2,021 08/27/2023 09:30 AM      DSE- 11/15/23  Positive EKG criteria for ischemia, for the workload achieved. Normal   augmentation on echocardiographic images. No chest pain. Occasional PVCs   and frequent PACs. O2 sat 98-99% during test. Resting hypertension with   severe hypertensive response to stress (peak /120). Patient's with   positive EKG and negative ECHO may represent a subset of patients that are   at increased risk for cardiac events. Clinical correlation recommended. Assessment, Recommendations, & Plan:  72 y.o. female with HTN, Positive EKG stress test & Family History CAD    HTN- Controlled on HCTZ, Cozaar, and Bystolic. Continue current regimen    Family History Early CAD/Positive EKG stress test- BENSON has resolved since lung infection was treated. DSE was EKG positive. Patient does have a strong family history of CAD. She is feeling great and really does not want to do anything invasive. We discussed doing a CT calcium score and she is agreeable to that.  Since we are not able to do here now, will schedule CT Calcium score at [facility].       Disposition - RTC in 1 month after CT calcium score or sooner if needed      Please do not hesitate to

## 2023-12-06 NOTE — TELEPHONE ENCOUNTER
Check out comments: Please schedule CT calcium scoring in January at [address] in office after     Informed Chung Joseph's MA Sabine Given that patient stated she is unable to pay out of pocket for the CT calcium scoring test. Asked what should I do for the follow up since patient is unable to complete this test. Sabine Given advised that patient call our office back when she finds a location that will accept her insurance for Cardiac CT. Provided patient with the option to contact [facility] for this test. MA stated once patient calls back with a facility we will take care of everything else from there. No follow up made for this reason.

## 2023-12-19 NOTE — PROGRESS NOTES
[facility] - Infectious Diseases Progress Note    Patient:  Deanna Brown  YOB: 1950  MRN: [id number]   Primary Care Physician: Marisa Mas APRN  Referring Physician: No ref. provider found     Chief Complaint:   Chief Complaint   Patient presents with    Pneumonia     Coughing up yellowish sputum.Per patient she had the Flu a couple of weeks ago     Interval History/HPI: Pleasant 73-year-old woman.  She is here for follow-up of bilateral pulmonary infiltrates some of which been cavitary.  She completed course of antibiotic treatment.  When I last saw her in November 8 she was showing improvement.  She was completing her antibiotic treatment.  We had ordered follow-up x-ray.  She was not able to have that completed.  She is seeing pulmonary at [facility].  They have her scheduled for a follow-up CT scan in early January with follow-up with them at that time as well.  She is here with her daughter.  Both of them indicates she continues to do better.  They are going [place] shopping today.  She is not having significant cough, sputum production, or dyspnea at this time.  She does not seem to be having fever.  She reports excellent appetite.  She seemed in very good spirits today and seem to be comfortable.  She stopped tobacco use back in August.  About 10 days ago she had had an upper respiratory illness.  She was diagnosed with flu.  Those symptoms have shown improvement.  She had had previous sputum sample that grew Mycobacterium gordonae.  We again discussed that result and I explained that typically it is not a pulmonary pathogen and this was most likely contaminant.    Allergies:   Allergies   Allergen Reactions    Atorvastatin Myalgia     Joints ache    Penicillins Rash     Current Scheduled Medications:   Current Outpatient Medications on File Prior to Visit   Medication Sig    albuterol sulfate  (90 Base) MCG/ACT inhaler Inhale 2 puffs Every 6 (Six) Hours As Needed.    ASPIRIN 81 PO Take 1 tablet  "by mouth Daily.    esomeprazole (nexIUM) 40 MG capsule Take 1 capsule by mouth Every Morning Before Breakfast.    hydroCHLOROthiazide (MICROZIDE) 12.5 MG capsule Take 1 capsule by mouth Every Morning.    montelukast (SINGULAIR) 10 MG tablet Take 1 tablet by mouth Every Night.    nebivolol (BYSTOLIC) 5 MG tablet Take 1 tablet by mouth Daily.    rOPINIRole (REQUIP) 1 MG tablet Take 1 tablet by mouth At Night As Needed.    dilTIAZem CD (CARDIZEM CD) 120 MG 24 hr capsule Take 1 capsule by mouth Daily. (Patient not taking: Reported on 11/8/2023)    losartan (COZAAR) 100 MG tablet Take 1 tablet by mouth Daily. (Patient not taking: Reported on 12/20/2023)    nebivolol (BYSTOLIC) 20 MG tablet Take 1 tablet by mouth 2 (Two) Times a Day. (Patient not taking: Reported on 11/8/2023)    Trelegy Ellipta 100-62.5-25 MCG/ACT inhaler Inhale 1 puff Daily. (Patient not taking: Reported on 11/8/2023)     No current facility-administered medications on file prior to visit.      Venous Access Review  Line/IV site: No current IV Access  Antimicrobial Review  Currently on antibiotics/antifungals: YES/NO: NO  Levofloxacin 9/19/2023-9/26/23  Cefdinir 12/4/2023-12/14/2023  If therapy completed, date complete: n/a     Review of Systems See HPI.    Vital Signs:  /73 (BP Location: Right arm, Patient Position: Sitting, Cuff Size: Adult)   Pulse 62   Temp 97.2 °F (36.2 °C) (Infrared)   Ht 154.9 cm (61\")   Wt 66.7 kg (147 lb)   SpO2 98%   BMI 27.78 kg/m²     Physical Exam  Vital signs - reviewed.  Alert, pleasant, no distress  No cervical or axillary adenopathy  Lungs with excellent airflow  There is no crackles or wheezes  She seems to be moving air well  General she was comfortable appearing.  She did not cough during evaluation even following deep breathing.    Lab/Imaging/Other Information:    I reviewed note from Dr Ronel MD dated 11/8/2023    CT of Chest without contrast diagnostic 11/2/2023  Impression  1.  Improving left " upper lobe and right sided mass-like consolidations.  2.  Left lower lobe mass-like consolidation.  3.  Scattered emphysema.  4.  Pneumobilia with prior cholecystectomy.    Impression & Recommendations:   Diagnoses and all orders for this visit:    1. Consolidation of left lower lobe of lung (Primary)    Continue off antibiotic treatment at this time  Discussed her prior CT results and sputum culture results with her.  Reviewed the importance of her continuing follow-up imaging to make sure no persistent findings on CT or chest x-ray.  I had ordered chest x-ray at the time of last appointment, but she was not able to keep that appointment to get the chest x-ray completed-at this point with her improving symptoms and a CT scheduled for early January would proceed with that imaging and do not feel chest x-ray is necessary.  Encouraged her to call for earlier appointment if any new or worsening problems.  She can otherwise follow-up in mid to late January.  She was comfortable with that plan    Follow Up:   Patient Instructions   Keep appt for CT in Jan 2024  Keep follow up with Dr Rodney 1/8/2024  Return in about 1 month (around 1/20/2024).  Patient was provided After Visit Summary.     Marck Galindo MD    CC: TRINITY Bowden MD

## 2023-12-20 ENCOUNTER — OFFICE VISIT (OUTPATIENT)
Age: 73
End: 2023-12-20
Payer: MEDICARE

## 2023-12-20 VITALS
SYSTOLIC BLOOD PRESSURE: 163 MMHG | TEMPERATURE: 97.2 F | WEIGHT: 147 LBS | DIASTOLIC BLOOD PRESSURE: 73 MMHG | OXYGEN SATURATION: 98 % | HEART RATE: 62 BPM | HEIGHT: 61 IN | BODY MASS INDEX: 27.75 KG/M2

## 2023-12-20 DIAGNOSIS — J18.1 CONSOLIDATION OF LEFT LOWER LOBE OF LUNG: Primary | ICD-10-CM

## 2023-12-20 RX ORDER — NEBIVOLOL 5 MG/1
1 TABLET ORAL DAILY
COMMUNITY
Start: 2023-12-18

## 2024-01-04 ENCOUNTER — HOSPITAL ENCOUNTER (OUTPATIENT)
Dept: MRI IMAGING | Age: 74
Discharge: HOME | End: 2024-01-04
Attending: NEUROLOGICAL SURGERY
Payer: MEDICARE

## 2024-01-04 DIAGNOSIS — R51.9 MILD HEADACHE: ICD-10-CM

## 2024-01-04 DIAGNOSIS — M89.9 SKULL LESION: ICD-10-CM

## 2024-01-04 DIAGNOSIS — H53.9 VISION CHANGES: ICD-10-CM

## 2024-01-04 PROCEDURE — 70553 MRI BRAIN STEM W/O & W/DYE: CPT

## 2024-01-04 PROCEDURE — 6360000004 HC RX CONTRAST MEDICATION: Performed by: NEUROLOGICAL SURGERY

## 2024-01-04 PROCEDURE — A9577 INJ MULTIHANCE: HCPCS | Performed by: NEUROLOGICAL SURGERY

## 2024-01-04 RX ADMIN — GADOBENATE DIMEGLUMINE 13 ML: 529 INJECTION, SOLUTION INTRAVENOUS at 11:19

## 2024-01-08 ENCOUNTER — HOSPITAL ENCOUNTER (OUTPATIENT)
Dept: PULMONOLOGY | Age: 74
Discharge: HOME | End: 2024-01-08
Attending: INTERNAL MEDICINE

## 2024-01-08 DIAGNOSIS — J43.1 PANLOBULAR EMPHYSEMA (HCC): ICD-10-CM

## 2024-01-08 DIAGNOSIS — R06.02 SHORTNESS OF BREATH: ICD-10-CM

## 2024-01-08 RX ORDER — ALBUTEROL SULFATE 2.5 MG/3ML
2.5 SOLUTION RESPIRATORY (INHALATION) 2 TIMES DAILY PRN
Status: DISCONTINUED | OUTPATIENT
Start: 2024-01-08 | End: 2024-01-10 | Stop reason: HOSPADM

## 2024-01-09 ENCOUNTER — TELEPHONE (OUTPATIENT)
Dept: INFUSION THERAPY | Age: 74
End: 2024-01-09

## 2024-01-09 ENCOUNTER — OFFICE VISIT (OUTPATIENT)
Dept: NEUROSURGERY | Age: 74
End: 2024-01-09
Payer: MEDICARE

## 2024-01-09 VITALS
WEIGHT: 144 LBS | OXYGEN SATURATION: 98 % | DIASTOLIC BLOOD PRESSURE: 76 MMHG | HEART RATE: 73 BPM | RESPIRATION RATE: 18 BRPM | SYSTOLIC BLOOD PRESSURE: 132 MMHG | HEIGHT: 61 IN | BODY MASS INDEX: 27.19 KG/M2

## 2024-01-09 DIAGNOSIS — M89.9 SKULL LESION: Primary | ICD-10-CM

## 2024-01-09 DIAGNOSIS — H53.9 VISION CHANGES: ICD-10-CM

## 2024-01-09 PROCEDURE — 3075F SYST BP GE 130 - 139MM HG: CPT | Performed by: NURSE PRACTITIONER

## 2024-01-09 PROCEDURE — 3078F DIAST BP <80 MM HG: CPT | Performed by: NURSE PRACTITIONER

## 2024-01-09 PROCEDURE — 99213 OFFICE O/P EST LOW 20 MIN: CPT | Performed by: NURSE PRACTITIONER

## 2024-01-09 PROCEDURE — 1123F ACP DISCUSS/DSCN MKR DOCD: CPT | Performed by: NURSE PRACTITIONER

## 2024-01-09 ASSESSMENT — ENCOUNTER SYMPTOMS
RESPIRATORY NEGATIVE: 1
BLURRED VISION: 1
GASTROINTESTINAL NEGATIVE: 1

## 2024-01-09 NOTE — TELEPHONE ENCOUNTER
Patient contacted clinic reporting that neurosurgery has told her that her lesion is smaller on her most recent imaging and has released her PRN. Patient is asking if she should keep her appointment with Dr. Barroso on 1/11/24. Instructed patient that she should keep this appointment with Dr. Barroso. She verbalized understanding and is agreeable to plan.

## 2024-01-09 NOTE — PROGRESS NOTES
Review of Systems   Constitutional: Negative.    HENT: Negative.     Eyes:  Positive for blurred vision.   Respiratory: Negative.     Cardiovascular: Negative.    Gastrointestinal: Negative.    Genitourinary: Negative.    Musculoskeletal: Negative.    Skin: Negative.    Neurological: Negative.    Endo/Heme/Allergies: Negative.    Psychiatric/Behavioral: Negative.        
enhancing lesion in the left parietal calvarium with cortical thinning, axial image 131.  No soft tissue swelling.     Limited Cervical Spine: No significant abnormality.        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     Chronic lacunar infarct in the left lentiform nucleus.        ______________________________________   Electronically signed by: CLEMENT CASTAÑEDA D.O.  Date:     07/19/2023  Time:    11:43            Exam Ended: 07/19/23 10:32 CDT Last Resulted: 07/19/23 12:15 CDT         I have personally reviewed the images and my interpretation is:  The left parietal skull lesions appears to enhance less when compared to the previous MRI dated 4/17/2023.  It measures only 11 mm.  It overall appears to be smaller/stable.        Examination: NM bone scan whole body  Clinical history: Abnormal finding on MRI of brain  Comparison: Brain MRI dated 4/17/2023  Technique: The patient was injected with 21.0 mCi of technetium 99m MDP.Delayed  whole-body scintigraphic imaging was performed.Additional coned-down images of  the skull were also obtained  IMPRESSION:  Findings/Impression:  Seen best on the lateral view of the skull, there is a small focus of increased  radiotracer accumulation which likely corresponds to the enhancing lesion of the  left parietal bone seen on recent brain MRI from 4/17/2023.This finding remains  indeterminate.Neoplastic lesion cannot be excluded.  Additional multifocal radiotracer accumulation is seen within the thoracic and  lumbar spine.  Osseous metastatic disease cannot be excluded.Multiple compression fractures  within the spine could produce a similar appearance.Further evaluation with MRI  of the thoracic and lumbar spine with and without IV contrast may be beneficial.  If patient does not have a known history of malignancy, further evaluation with  contrast-enhanced CT of the

## 2024-01-11 ENCOUNTER — TELEPHONE (OUTPATIENT)
Age: 74
End: 2024-01-11
Payer: MEDICARE

## 2024-01-11 NOTE — TELEPHONE ENCOUNTER
Spoke with pt regarding upcoming appt on 01/24/2024.  Pt has remained with [employer] Medicare advantage insurance so I asked if she had obtained a continuity of care so we can continue to see her as a pt.  She has not obtained this so she is going to contact her PCP to see about obtaining a continuity of care.  I let her know that if her PCP instructs her to call her insurance then she will need to do that to obtain the continuity of care.  She will call back and cancel if she is unable to obtain this.

## 2024-01-23 NOTE — PROGRESS NOTES
MEDICAL ONCOLOGY PROGRESS NOTE    Pt Name: Sherice Mcfarland  MRN: [id number]  YOB: 1950  Date of evaluation: 3/5/2024      HISTORY OF PRESENT ILLNESS:    Patient returns today for further follow-up of findings of a lytic skull lesion.    She does not have any myeloma.  She has been seen by radiation oncology as well as neurosurgery.  She has been discharged from neurosurgery standpoint.  She had a repeat brain MRI that showed a stability of her skull lesion.  Reviewed radiation oncology and neurosurgery notes and recommendations.      Diagnosis  Left parietal bone lesion, April 2023  Thoracic/lumbar spine lesions    Treatment Summary  Continuous surveillance.  8/16/23 Consulted by  at [facility] no radiation therapy at this time    Cancer History  Sherice Mcfarland was first seen by me on 5/19/2023.  She was referred by neurology for abnormal findings of a enhancing lesion in the calvarium.  3/21/23 [facility] labs: CBC-WBC 7.9, HGB 12.8, HCT 40.0, , NEUT 4.6; CMP-WNL;  CRP 4.26; RPR negative; ESR 75; HIV-negative; B12 156; TSH reflex T4 1.93  4/17/23 MRI brain: Findings compatible with chronic microvascular ischemic change. McDaniels cystic focus within the left frontoparietal region may represent sequela of remote infarct. No diffusion restriction is identified to suggest acute infarct. Developmental venous anomaly within the right parietal lobe. Indeterminate 1.3 cm enhancing lesion within the left parietal bone. Neoplastic lesion cannot be excluded. Nuclear bone scan may be useful for further characterization of this lesion.  5/1/23 Bone scan: Seen best on the lateral view of the skull, there is a small focus of increased radiotracer accumulation which likely corresponds to the enhancing lesion of the left parietal bone seen on recent brain MRI from 4/17/2023.This finding remains indeterminate. Neoplastic lesion cannot be excluded. Additional multifocal radiotracer accumulation is seen within the thoracic and lumbar

## 2024-01-24 ENCOUNTER — TELEPHONE (OUTPATIENT)
Dept: HEMATOLOGY | Age: 74
End: 2024-01-24

## 2024-01-24 DIAGNOSIS — M89.9 LESION OF BONE OF THORACIC SPINE: Primary | ICD-10-CM

## 2024-01-24 DIAGNOSIS — M89.9 LESION OF BONE OF LUMBOSACRAL SPINE: ICD-10-CM

## 2024-01-24 NOTE — TELEPHONE ENCOUNTER
Called patient today for their upcoming appointment on 1/25/2024 and patient needed to be rescheduled. Patient's name and number was taken and given to  staff (     )  so that the patient could be rescheduled to a better date & time for the patient.

## 2024-03-01 ENCOUNTER — TELEPHONE (OUTPATIENT)
Dept: HEMATOLOGY | Age: 74
End: 2024-03-01

## 2024-03-01 NOTE — TELEPHONE ENCOUNTER
Called patient and reminded patient of their appointment on 3/5/2024 and patient confirmed they would be here.

## 2024-03-05 ENCOUNTER — HOSPITAL ENCOUNTER (OUTPATIENT)
Dept: INFUSION THERAPY | Age: 74
Discharge: HOME | End: 2024-03-05
Payer: MEDICARE

## 2024-03-05 ENCOUNTER — OFFICE VISIT (OUTPATIENT)
Dept: HEMATOLOGY | Age: 74
End: 2024-03-05
Payer: MEDICARE

## 2024-03-05 VITALS
WEIGHT: 154 LBS | OXYGEN SATURATION: 99 % | TEMPERATURE: 97.7 F | DIASTOLIC BLOOD PRESSURE: 82 MMHG | SYSTOLIC BLOOD PRESSURE: 132 MMHG | HEIGHT: 61 IN | BODY MASS INDEX: 29.07 KG/M2 | HEART RATE: 62 BPM

## 2024-03-05 DIAGNOSIS — R94.8 ABNORMAL RADIONUCLIDE SCAN: ICD-10-CM

## 2024-03-05 DIAGNOSIS — Z71.89 CARE PLAN DISCUSSED WITH PATIENT: ICD-10-CM

## 2024-03-05 DIAGNOSIS — R53.83 FATIGUE DUE TO TREATMENT: ICD-10-CM

## 2024-03-05 DIAGNOSIS — M89.9 LYTIC BONE LESIONS ON XRAY: Primary | ICD-10-CM

## 2024-03-05 DIAGNOSIS — M89.9 SKULL LESION: ICD-10-CM

## 2024-03-05 PROCEDURE — G2211 COMPLEX E/M VISIT ADD ON: HCPCS | Performed by: INTERNAL MEDICINE

## 2024-03-05 PROCEDURE — 99212 OFFICE O/P EST SF 10 MIN: CPT

## 2024-03-05 PROCEDURE — 3079F DIAST BP 80-89 MM HG: CPT | Performed by: INTERNAL MEDICINE

## 2024-03-05 PROCEDURE — 1123F ACP DISCUSS/DSCN MKR DOCD: CPT | Performed by: INTERNAL MEDICINE

## 2024-03-05 PROCEDURE — 99213 OFFICE O/P EST LOW 20 MIN: CPT | Performed by: INTERNAL MEDICINE

## 2024-03-05 PROCEDURE — 3075F SYST BP GE 130 - 139MM HG: CPT | Performed by: INTERNAL MEDICINE

## 2024-03-14 ENCOUNTER — OFFICE VISIT (OUTPATIENT)
Dept: PULMONOLOGY | Age: 74
End: 2024-03-14
Attending: INTERNAL MEDICINE
Payer: MEDICARE

## 2024-03-14 ENCOUNTER — HOSPITAL ENCOUNTER (OUTPATIENT)
Dept: PULMONOLOGY | Age: 74
Discharge: HOME | End: 2024-03-14
Attending: INTERNAL MEDICINE
Payer: MEDICARE

## 2024-03-14 VITALS
DIASTOLIC BLOOD PRESSURE: 82 MMHG | HEART RATE: 71 BPM | BODY MASS INDEX: 29.64 KG/M2 | OXYGEN SATURATION: 99 % | SYSTOLIC BLOOD PRESSURE: 144 MMHG | WEIGHT: 157 LBS | TEMPERATURE: 97.8 F | HEIGHT: 61 IN

## 2024-03-14 VITALS — OXYGEN SATURATION: 98 % | RESPIRATION RATE: 14 BRPM | BODY MASS INDEX: 29.07 KG/M2 | WEIGHT: 154 LBS | HEIGHT: 61 IN

## 2024-03-14 DIAGNOSIS — J43.1 PANLOBULAR EMPHYSEMA (HCC): ICD-10-CM

## 2024-03-14 DIAGNOSIS — Z87.891 HISTORY OF SMOKING: ICD-10-CM

## 2024-03-14 DIAGNOSIS — J44.9 STAGE 1 MILD COPD BY GOLD CLASSIFICATION (HCC): Primary | ICD-10-CM

## 2024-03-14 DIAGNOSIS — R06.02 SHORTNESS OF BREATH: ICD-10-CM

## 2024-03-14 DIAGNOSIS — R05.3 CHRONIC COUGH: ICD-10-CM

## 2024-03-14 DIAGNOSIS — A31.9 MYCOBACTERIUM GORDONAE INFECTION: ICD-10-CM

## 2024-03-14 PROCEDURE — 3077F SYST BP >= 140 MM HG: CPT | Performed by: INTERNAL MEDICINE

## 2024-03-14 PROCEDURE — 1123F ACP DISCUSS/DSCN MKR DOCD: CPT | Performed by: INTERNAL MEDICINE

## 2024-03-14 PROCEDURE — 6360000002 HC RX W HCPCS: Performed by: INTERNAL MEDICINE

## 2024-03-14 PROCEDURE — 94729 DIFFUSING CAPACITY: CPT

## 2024-03-14 PROCEDURE — 94060 EVALUATION OF WHEEZING: CPT

## 2024-03-14 PROCEDURE — 94726 PLETHYSMOGRAPHY LUNG VOLUMES: CPT

## 2024-03-14 PROCEDURE — 99214 OFFICE O/P EST MOD 30 MIN: CPT | Performed by: INTERNAL MEDICINE

## 2024-03-14 PROCEDURE — 3079F DIAST BP 80-89 MM HG: CPT | Performed by: INTERNAL MEDICINE

## 2024-03-14 RX ORDER — ALBUTEROL SULFATE 2.5 MG/3ML
2.5 SOLUTION RESPIRATORY (INHALATION) 2 TIMES DAILY PRN
Status: DISCONTINUED | OUTPATIENT
Start: 2024-03-14 | End: 2024-03-16 | Stop reason: HOSPADM

## 2024-03-14 RX ADMIN — ALBUTEROL SULFATE 2.5 MG: 2.5 SOLUTION RESPIRATORY (INHALATION) at 09:01

## 2024-03-14 ASSESSMENT — ENCOUNTER SYMPTOMS
ABDOMINAL PAIN: 0
SHORTNESS OF BREATH: 0
COUGH: 0
WHEEZING: 0
ANAL BLEEDING: 0
BACK PAIN: 0
APNEA: 0
RHINORRHEA: 0
ABDOMINAL DISTENTION: 0
CHEST TIGHTNESS: 0

## 2024-03-14 NOTE — PROGRESS NOTES
Pulmonary and Sleep Medicine    Sherice Mcfarland (:  1950) is a 73 y.o. female,Established patient, here for evaluation of the following chief complaint(s):  Follow-up (PFT follow up)      Referring physician:  Wally Dominique Md  [address] [place] [address]     ASSESSMENT/PLAN:  1. Stage 1 mild COPD by GOLD classification (HCC)  -     tiotropium-olodaterol (STIOLTO RESPIMAT) 2.5-2.5 MCG/ACT AERS; Inhale 2 puffs into the lungs daily, Disp-1 each, R-11Normal  2. Mycobacterium gordonae infection  -     CT CHEST WO CONTRAST; Future  3. Panlobular emphysema ([facility])  4. History of smoking quit 2023  5. Shortness of breath  6. Chronic cough        Will repeat CT of the chest to assess her underlying anatomy.  She does have Mycobacterium gordonii infection that does not seem to be consequential at this time.  Will start her on Stiolto inhaler for better symptom control of her stage I COPD.       Wally Dominique MD, [facility], [facility]    Return in about 4 weeks (around 2024).    SUBJECTIVE/OBJECTIVE:        The patient is here for follow-up on COPD and Mycobacterium infection.  She continues to use her rescue inhaler occasionally through the week.  She had a pulmonary function study done today and it did show mild COPD.        Prior to Visit Medications    Medication Sig Taking? Authorizing Provider   nebivolol (BYSTOLIC) 5 MG tablet Take 1 tablet by mouth daily Yes Kassy Rivera APRN   hydroCHLOROthiazide (MICROZIDE) 12.5 MG capsule  Yes Elinor Rolle MD   albuterol sulfate HFA (PROVENTIL;VENTOLIN;PROAIR) 108 (90 Base) MCG/ACT inhaler Inhale 2 puffs into the lungs every 4 hours as needed for Wheezing or Shortness of Breath Yes Boyd Gray MD   ASPIRIN 81 PO Take by mouth daily Yes Elinor Rolle MD   losartan (COZAAR) 100 MG tablet Take 1 tablet by mouth daily Yes Elinor Rolle MD   esomeprazole (NEXIUM) 40 MG delayed release capsule every

## 2024-03-14 NOTE — PROCEDURES
Media Information        Pulmonary Function Study    Interpretation:    The FVC is Normal. FEV1 is mildly reduced. FEV1/FVC ratio is reduced. After bronchodilator therapy there was no significant change in FEV1. Total lung capacity is increased. Residual volume is increased.      Diffusing lung capacity when corrected for alveolar volume is Normal.         Impression:    Mild chronic obstructive pulmonary disease.         Wally Dominique MD, FCCP, [facility]

## 2024-03-18 DIAGNOSIS — R06.02 SHORTNESS OF BREATH: ICD-10-CM

## 2024-03-18 DIAGNOSIS — J43.1 PANLOBULAR EMPHYSEMA (HCC): Primary | ICD-10-CM

## 2024-03-21 ENCOUNTER — TELEPHONE (OUTPATIENT)
Dept: PULMONOLOGY | Age: 74
End: 2024-03-21

## 2024-03-21 NOTE — TELEPHONE ENCOUNTER
Pt called office and lt vm stating she was given a new inhaler script at her last visit and it seems to not be helping her. She is wanting a nurse to call her back to ask more questions. Pt didn't leave medication name

## 2024-04-04 NOTE — TELEPHONE ENCOUNTER
Spoke to the patient and she said that when taking the stiolto that she was given at the last OV her symptoms got worse.  She says that she stopped taking stiolto and resumed the trelegy and is doing very well.  I checked with Dr Bravo to make sure that he was ok with this and he said if she was doing better that was fine.   I relayed this to  the patient and she verbalized understanding\

## 2024-04-15 ENCOUNTER — HOSPITAL ENCOUNTER (OUTPATIENT)
Dept: CT IMAGING | Age: 74
Discharge: HOME | End: 2024-04-15
Payer: MEDICARE

## 2024-04-15 DIAGNOSIS — A31.9 MYCOBACTERIUM GORDONAE INFECTION: ICD-10-CM

## 2024-04-15 PROCEDURE — 71250 CT THORAX DX C-: CPT

## 2024-04-22 ENCOUNTER — OFFICE VISIT (OUTPATIENT)
Dept: PULMONOLOGY | Age: 74
End: 2024-04-22
Payer: MEDICARE

## 2024-04-22 VITALS
SYSTOLIC BLOOD PRESSURE: 138 MMHG | RESPIRATION RATE: 20 BRPM | HEIGHT: 61 IN | BODY MASS INDEX: 29.83 KG/M2 | HEART RATE: 50 BPM | OXYGEN SATURATION: 99 % | DIASTOLIC BLOOD PRESSURE: 86 MMHG | WEIGHT: 158 LBS

## 2024-04-22 DIAGNOSIS — A31.9 MYCOBACTERIUM GORDONAE INFECTION: ICD-10-CM

## 2024-04-22 DIAGNOSIS — R05.3 CHRONIC COUGH: ICD-10-CM

## 2024-04-22 DIAGNOSIS — J43.1 PANLOBULAR EMPHYSEMA (HCC): ICD-10-CM

## 2024-04-22 DIAGNOSIS — Z87.891 HISTORY OF SMOKING: ICD-10-CM

## 2024-04-22 DIAGNOSIS — R05.3 CHRONIC COUGH: Primary | ICD-10-CM

## 2024-04-22 DIAGNOSIS — R06.02 SHORTNESS OF BREATH: ICD-10-CM

## 2024-04-22 DIAGNOSIS — J44.9 STAGE 1 MILD COPD BY GOLD CLASSIFICATION (HCC): ICD-10-CM

## 2024-04-22 PROCEDURE — 3079F DIAST BP 80-89 MM HG: CPT | Performed by: INTERNAL MEDICINE

## 2024-04-22 PROCEDURE — 3075F SYST BP GE 130 - 139MM HG: CPT | Performed by: INTERNAL MEDICINE

## 2024-04-22 PROCEDURE — 99214 OFFICE O/P EST MOD 30 MIN: CPT | Performed by: INTERNAL MEDICINE

## 2024-04-22 PROCEDURE — 1123F ACP DISCUSS/DSCN MKR DOCD: CPT | Performed by: INTERNAL MEDICINE

## 2024-04-22 RX ORDER — GEMFIBROZIL 600 MG/1
TABLET, FILM COATED ORAL
COMMUNITY
Start: 2024-04-18

## 2024-04-22 RX ORDER — FLUTICASONE FUROATE, UMECLIDINIUM BROMIDE AND VILANTEROL TRIFENATATE 100; 62.5; 25 UG/1; UG/1; UG/1
1 POWDER RESPIRATORY (INHALATION) DAILY
COMMUNITY

## 2024-04-22 RX ORDER — GABAPENTIN 100 MG/1
100 CAPSULE ORAL DAILY
COMMUNITY
Start: 2024-03-25

## 2024-04-22 ASSESSMENT — ENCOUNTER SYMPTOMS
RHINORRHEA: 0
ABDOMINAL DISTENTION: 0
APNEA: 0
ANAL BLEEDING: 0
ABDOMINAL PAIN: 0
SHORTNESS OF BREATH: 0
CHEST TIGHTNESS: 0
WHEEZING: 0
BACK PAIN: 0
COUGH: 1

## 2024-04-22 NOTE — PROGRESS NOTES
Pulmonary and Sleep Medicine    Sherice Mcfarland (:  1950) is a 74 y.o. female,Established patient, here for evaluation of the following chief complaint(s):  Follow-up (Here for follow up. Pt c/o cough. Pt advised she could not use inhaler prescribed by this doctor)      Referring physician:  No referring provider defined for this encounter.     ASSESSMENT/PLAN:  1. Chronic cough  -     Culture, Respiratory; Future  -     Culture with Smear, Acid Fast Bacillius; Future  2. Mycobacterium gordonae infection  -     Culture, Respiratory; Future  -     Culture with Smear, Acid Fast Bacillius; Future  3. Panlobular emphysema (HCC)  4. Shortness of breath  5. History of smoking quit 2023  6. Stage 1 mild COPD by GOLD classification (HCC)        Due to persistent cough we will repeat her sputum culture reevaluate for Mycobacterium gordonae.  She might need treatment due to persistent cough.  She uses a trilogy inhaler only as needed.  She has not used Stiolto.  She felt it made her symptoms worse.       [facility]ally Dominique [facility], [facility], [facility]    Return in about 6 weeks (around 6/3/2024).    SUBJECTIVE/OBJECTIVE:        Patient is here for follow-up on chronic cough and Mycobacterium gordonii infection.  She had a CT of the chest that showed significant improvement in the prior infiltrates.  She continues to cough daily.  She uses Trelegy inhaler occasionally.  Her pulmonary function study showed mild COPD with an FEV1 of 71%.  She denies any weight loss.  Oxygen saturation 99% on room air.        Prior to Visit Medications    Medication Sig Taking? Authorizing Provider   gabapentin (NEURONTIN) 100 MG capsule Take 1 capsule by mouth daily. Yes Provider, MD Elinor   gemfibrozil (LOPID) 600 MG tablet  Yes Provider, MD Elinor   fluticasone-umeclidin-vilant (TRELEGY ELLIPTA) 100-62.5-25 MCG/ACT AEPB inhaler Inhale 1 puff into the lungs daily Yes Provider, MD Elinor   nebivolol (BYSTOLIC) 5 MG

## 2024-04-24 LAB
BACTERIA SPEC RESP CULT: NORMAL
GRAM STN SPEC: NORMAL

## 2024-05-30 ENCOUNTER — TELEPHONE (OUTPATIENT)
Dept: PULMONOLOGY | Age: 74
End: 2024-05-30

## 2024-05-30 NOTE — TELEPHONE ENCOUNTER
I spoke to Caridad ( supervisor in Micro) and asked about the acid fast bacillius and why it was not done.  She said that she thinks it did not \"cross over\" and that she would investigate to see what happened.

## 2024-06-03 ENCOUNTER — OFFICE VISIT (OUTPATIENT)
Dept: PULMONOLOGY | Age: 74
End: 2024-06-03
Payer: MEDICARE

## 2024-06-03 VITALS
OXYGEN SATURATION: 98 % | WEIGHT: 158 LBS | HEIGHT: 61 IN | HEART RATE: 59 BPM | DIASTOLIC BLOOD PRESSURE: 68 MMHG | RESPIRATION RATE: 18 BRPM | SYSTOLIC BLOOD PRESSURE: 145 MMHG | BODY MASS INDEX: 29.83 KG/M2

## 2024-06-03 DIAGNOSIS — A31.9 MYCOBACTERIUM GORDONAE INFECTION: ICD-10-CM

## 2024-06-03 DIAGNOSIS — J43.1 PANLOBULAR EMPHYSEMA (HCC): ICD-10-CM

## 2024-06-03 DIAGNOSIS — K21.9 GASTROESOPHAGEAL REFLUX DISEASE WITHOUT ESOPHAGITIS: ICD-10-CM

## 2024-06-03 DIAGNOSIS — R05.3 CHRONIC COUGH: Primary | ICD-10-CM

## 2024-06-03 DIAGNOSIS — Z87.891 HISTORY OF SMOKING: ICD-10-CM

## 2024-06-03 DIAGNOSIS — R06.02 SHORTNESS OF BREATH: ICD-10-CM

## 2024-06-03 PROCEDURE — 99214 OFFICE O/P EST MOD 30 MIN: CPT | Performed by: INTERNAL MEDICINE

## 2024-06-03 PROCEDURE — 1123F ACP DISCUSS/DSCN MKR DOCD: CPT | Performed by: INTERNAL MEDICINE

## 2024-06-03 PROCEDURE — 3077F SYST BP >= 140 MM HG: CPT | Performed by: INTERNAL MEDICINE

## 2024-06-03 PROCEDURE — 3078F DIAST BP <80 MM HG: CPT | Performed by: INTERNAL MEDICINE

## 2024-06-03 RX ORDER — FLUTICASONE PROPIONATE 50 MCG
SPRAY, SUSPENSION (ML) NASAL
COMMUNITY
Start: 2024-05-17

## 2024-06-03 RX ORDER — ESOMEPRAZOLE MAGNESIUM 40 MG/1
40 CAPSULE, DELAYED RELEASE ORAL 2 TIMES DAILY
Qty: 180 CAPSULE | Refills: 3 | Status: SHIPPED | OUTPATIENT
Start: 2024-06-03

## 2024-06-03 ASSESSMENT — ENCOUNTER SYMPTOMS
ABDOMINAL DISTENTION: 0
ABDOMINAL PAIN: 0
APNEA: 0
WHEEZING: 0
RHINORRHEA: 0
SHORTNESS OF BREATH: 0
BACK PAIN: 0
ANAL BLEEDING: 0
CHEST TIGHTNESS: 0
COUGH: 0

## 2024-06-03 NOTE — PROGRESS NOTES
appearance.   HENT:      Head: Normocephalic and atraumatic.      Nose: Nose normal.   Eyes:      Extraocular Movements: Extraocular movements intact.      Conjunctiva/sclera: Conjunctivae normal.   Cardiovascular:      Rate and Rhythm: Normal rate and regular rhythm.      Heart sounds: No murmur heard.     No friction rub.   Pulmonary:      Effort: Pulmonary effort is normal. No respiratory distress.      Breath sounds: Normal breath sounds. No stridor. No wheezing, rhonchi or rales.   Abdominal:      General: There is no distension.      Palpations: There is no mass.      Tenderness: There is no abdominal tenderness. There is no guarding or rebound.   Musculoskeletal:      Cervical back: Normal range of motion and neck supple.   Neurological:      Mental Status: She is alert and oriented to person, place, and time.             This note was generated using a voice recognition software. Errors in voice recognition may have occurred.      An electronic signature was used to authenticate this note.    --Wally Dominique MD

## 2024-06-14 ENCOUNTER — TELEPHONE (OUTPATIENT)
Dept: PULMONOLOGY | Age: 74
End: 2024-06-14

## 2024-06-14 NOTE — TELEPHONE ENCOUNTER
Ms Bartolo called & said that a sputum sample was ordered for her, but she no longer has a productive cough.  She has not been able to cough a sputum sample up.  She says that she just wanted to make Dr Bravo aware.

## 2024-07-05 ENCOUNTER — TELEPHONE (OUTPATIENT)
Dept: PULMONOLOGY | Age: 74
End: 2024-07-05

## 2024-07-05 NOTE — TELEPHONE ENCOUNTER
Called pt to check on her cough. ( Reasoning for appt 7/8/24). Pt states she is no longer having the cough and wanting to cancel the appt. Pt will call us back if she is needing us at a later time

## 2024-09-05 ENCOUNTER — TELEPHONE (OUTPATIENT)
Dept: HEMATOLOGY | Age: 74
End: 2024-09-05

## 2024-09-05 ENCOUNTER — HOSPITAL ENCOUNTER (OUTPATIENT)
Dept: CT IMAGING | Age: 74
Discharge: HOME | End: 2024-09-05
Payer: MEDICARE

## 2024-09-05 DIAGNOSIS — M89.9 LYTIC BONE LESIONS ON XRAY: ICD-10-CM

## 2024-09-05 DIAGNOSIS — M89.9 SKULL LESION: ICD-10-CM

## 2024-09-05 PROCEDURE — 70450 CT HEAD/BRAIN W/O DYE: CPT

## 2024-09-05 NOTE — TELEPHONE ENCOUNTER
Called Patient and reminded patient of their appointment on 09/09/2024 and patient confirmed they would be here. Reminded patient to just come at appointment time, and to not come at the lab appointment time. Reminded patient that we will not check them in any more than 30 minutes before appointment time.  We have now moved to the [facility] cancer center that is located between our old office and the ER at the \[facility]". Letting the Pt know that our front entrance faces the  [employer] [place].

## 2024-09-09 ENCOUNTER — HOSPITAL ENCOUNTER (OUTPATIENT)
Dept: INFUSION THERAPY | Age: 74
Discharge: HOME | End: 2024-09-09
Payer: MEDICARE

## 2024-09-09 ENCOUNTER — OFFICE VISIT (OUTPATIENT)
Dept: HEMATOLOGY | Age: 74
End: 2024-09-09
Payer: MEDICARE

## 2024-09-09 VITALS
OXYGEN SATURATION: 99 % | WEIGHT: 162.7 LBS | TEMPERATURE: 97.8 F | HEART RATE: 66 BPM | SYSTOLIC BLOOD PRESSURE: 134 MMHG | HEIGHT: 61 IN | BODY MASS INDEX: 30.72 KG/M2 | DIASTOLIC BLOOD PRESSURE: 82 MMHG

## 2024-09-09 DIAGNOSIS — Z71.89 CARE PLAN DISCUSSED WITH PATIENT: ICD-10-CM

## 2024-09-09 DIAGNOSIS — R53.83 FATIGUE DUE TO TREATMENT: ICD-10-CM

## 2024-09-09 DIAGNOSIS — M89.9 LYTIC BONE LESIONS ON XRAY: Primary | ICD-10-CM

## 2024-09-09 PROCEDURE — 3079F DIAST BP 80-89 MM HG: CPT | Performed by: INTERNAL MEDICINE

## 2024-09-09 PROCEDURE — G2211 COMPLEX E/M VISIT ADD ON: HCPCS | Performed by: INTERNAL MEDICINE

## 2024-09-09 PROCEDURE — 99212 OFFICE O/P EST SF 10 MIN: CPT | Performed by: INTERNAL MEDICINE

## 2024-09-09 PROCEDURE — 1123F ACP DISCUSS/DSCN MKR DOCD: CPT | Performed by: INTERNAL MEDICINE

## 2024-09-09 PROCEDURE — 3075F SYST BP GE 130 - 139MM HG: CPT | Performed by: INTERNAL MEDICINE

## 2024-09-09 PROCEDURE — 99212 OFFICE O/P EST SF 10 MIN: CPT

## 2024-10-21 ENCOUNTER — TELEPHONE (OUTPATIENT)
Dept: NEUROSURGERY | Age: 74
End: 2024-10-21

## 2024-10-21 NOTE — TELEPHONE ENCOUNTER
Received a new patient referral but patient is already established with our office. Called patient and got her scheduled for 1st available on 12/4/2024 @ 11:30am. Patient thanked me and voiced understanding.    She has had CT Lumbar Spine (10/9/2024) @ [facility]

## 2024-10-23 NOTE — PLAN OF CARE
Problem: Discharge Planning  Goal: Discharge to home or other facility with appropriate resources  Outcome: Progressing  Flowsheets  Taken 9/16/2023 0940 by Thania Mathias RN  Discharge to home or other facility with appropriate resources: Identify barriers to discharge with patient and caregiver  Taken 9/15/2023 2130 by Federico Villatoro RN  Discharge to home or other facility with appropriate resources: Identify barriers to discharge with patient and caregiver     Problem: Skin/Tissue Integrity  Goal: Absence of new skin breakdown  Description: 1. Monitor for areas of redness and/or skin breakdown  2. Assess vascular access sites hourly  3. Every 4-6 hours minimum:  Change oxygen saturation probe site  4. Every 4-6 hours:  If on nasal continuous positive airway pressure, respiratory therapy assess nares and determine need for appliance change or resting period.   Outcome: Progressing     Problem: Safety - Adult  Goal: Free from fall injury  Outcome: Progressing  Flowsheets (Taken 9/16/2023 0338 by Federico Villatoro RN)  Free From Fall Injury: Instruct family/caregiver on patient safety     Problem: ABCDS Injury Assessment  Goal: Absence of physical injury  Outcome: Progressing  Flowsheets (Taken 9/16/2023 0338 by Federico Villatoro RN)  Absence of Physical Injury: Implement safety measures based on patient assessment     Problem: Nutrition Deficit:  Goal: Optimize nutritional status  Outcome: Progressing     Problem: Chronic Conditions and Co-morbidities  Goal: Patient's chronic conditions and co-morbidity symptoms are monitored and maintained or improved  Outcome: Progressing  Flowsheets (Taken 9/15/2023 2130 by Federico Villatoro RN)  Care Plan - Patient's Chronic Conditions and Co-Morbidity Symptoms are Monitored and Maintained or Improved: Monitor and assess patient's chronic conditions and comorbid symptoms for stability, deterioration, or improvement No - the patient is unable to be screened due to medical condition

## 2024-12-04 ENCOUNTER — OFFICE VISIT (OUTPATIENT)
Dept: NEUROSURGERY | Age: 74
End: 2024-12-04
Payer: MEDICARE

## 2024-12-04 VITALS
SYSTOLIC BLOOD PRESSURE: 134 MMHG | HEART RATE: 69 BPM | HEIGHT: 61 IN | WEIGHT: 166 LBS | DIASTOLIC BLOOD PRESSURE: 82 MMHG | BODY MASS INDEX: 31.34 KG/M2 | OXYGEN SATURATION: 99 %

## 2024-12-04 DIAGNOSIS — M43.16 SPONDYLOLISTHESIS AT L2-L3 LEVEL: ICD-10-CM

## 2024-12-04 DIAGNOSIS — M54.42 CHRONIC MIDLINE LOW BACK PAIN WITH BILATERAL SCIATICA: ICD-10-CM

## 2024-12-04 DIAGNOSIS — M51.362 DEGENERATION OF INTERVERTEBRAL DISC OF LUMBAR REGION WITH DISCOGENIC BACK PAIN AND LOWER EXTREMITY PAIN: Primary | ICD-10-CM

## 2024-12-04 DIAGNOSIS — G89.29 CHRONIC MIDLINE LOW BACK PAIN WITH BILATERAL SCIATICA: ICD-10-CM

## 2024-12-04 DIAGNOSIS — M54.41 CHRONIC MIDLINE LOW BACK PAIN WITH BILATERAL SCIATICA: ICD-10-CM

## 2024-12-04 DIAGNOSIS — M51.9 SCHMORL'S NODE: ICD-10-CM

## 2024-12-04 PROCEDURE — 3075F SYST BP GE 130 - 139MM HG: CPT | Performed by: NURSE PRACTITIONER

## 2024-12-04 PROCEDURE — 1123F ACP DISCUSS/DSCN MKR DOCD: CPT | Performed by: NURSE PRACTITIONER

## 2024-12-04 PROCEDURE — 99214 OFFICE O/P EST MOD 30 MIN: CPT | Performed by: NURSE PRACTITIONER

## 2024-12-04 PROCEDURE — 1159F MED LIST DOCD IN RCRD: CPT | Performed by: NURSE PRACTITIONER

## 2024-12-04 PROCEDURE — 3079F DIAST BP 80-89 MM HG: CPT | Performed by: NURSE PRACTITIONER

## 2024-12-04 RX ORDER — HYDROCODONE BITARTRATE AND ACETAMINOPHEN 5; 325 MG/1; MG/1
1 TABLET ORAL 2 TIMES DAILY PRN
Qty: 60 TABLET | Refills: 0 | Status: SHIPPED | OUTPATIENT
Start: 2024-12-04 | End: 2025-01-03

## 2024-12-04 RX ORDER — METHOCARBAMOL 750 MG/1
750 TABLET, FILM COATED ORAL 2 TIMES DAILY PRN
Qty: 60 TABLET | Refills: 0 | Status: SHIPPED | OUTPATIENT
Start: 2024-12-04 | End: 2025-01-03

## 2024-12-04 ASSESSMENT — ENCOUNTER SYMPTOMS
RESPIRATORY NEGATIVE: 1
BACK PAIN: 1
GASTROINTESTINAL NEGATIVE: 1
EYES NEGATIVE: 1

## 2024-12-04 NOTE — PROGRESS NOTES
[facility]  Office Visit      Chief Complaint   Patient presents with    Follow-up    Back Pain     Patient states that she has been suffering from back pain since October. She states that she has extreme pain in the lower back region that radiates into her left leg.  Patient states any bending twisting or stretching causes more pain. Patient has not tried PT, or pain management within the last year. Patient is taking gabapentin and OTC tylenol for pain relief.     Results     CT lumbar spine [facility] [place], [place]    Numbness     Patient states standing for any period of time causes her BLE to go numb.       HISTORY OF PRESENT ILLNESS:    Sherice Mcfarland is a 74 y.o. female with COPD, HTN who we initially evaluated back in 2023 for a skull lesion that we felt to be benign here today with complaints of low back pain and a new CT of the lumbar spine for review.      She states she is have severe low back pain that started about 2 months ago. She states when it first started it was in her left leg and ankles to where she might give out and have to grab onto something.  The pain is worse with bending, twisting, stretching.  The pain does travel into the BLE lateral thighs worsened with standing or walking for about 5 minutes.  States she can't even get her bed made up without the pain.  She is no longer walking through stores she is riding a scooter.  Has associated numbness and paresthesias of the same distribution.  Feels like she has a lot of pressure in the mid low back.  When up and walking she has about 50% back and 50% leg pain.      Has had to go to the ED for the severity of the pain.        She has attempted the following non-operative treatments:  Gabapentin 300mg TID  Tylenol   Norco - from ED - helped some   Muscle relaxer from ED - helped some   Chiropractic therapy - made her pain much worse   Physical therapy in the past  - made pain worse       She is a smoker.  She is on ASA.                Past Medical

## 2024-12-04 NOTE — PROGRESS NOTES
Review of Systems   Constitutional: Negative.    HENT: Negative.     Eyes: Negative.    Respiratory: Negative.     Cardiovascular: Negative.    Gastrointestinal: Negative.    Genitourinary: Negative.    Musculoskeletal:  Positive for back pain and myalgias.   Skin: Negative.    Neurological:  Positive for weakness.   Endo/Heme/Allergies: Negative.    Psychiatric/Behavioral: Negative.

## 2024-12-11 ENCOUNTER — HOSPITAL ENCOUNTER (OUTPATIENT)
Dept: MRI IMAGING | Age: 74
Discharge: HOME | End: 2024-12-11
Payer: MEDICARE

## 2024-12-11 DIAGNOSIS — M51.362 DEGENERATION OF INTERVERTEBRAL DISC OF LUMBAR REGION WITH DISCOGENIC BACK PAIN AND LOWER EXTREMITY PAIN: ICD-10-CM

## 2024-12-11 DIAGNOSIS — M43.16 SPONDYLOLISTHESIS AT L2-L3 LEVEL: ICD-10-CM

## 2024-12-11 DIAGNOSIS — M51.9 SCHMORL'S NODE: ICD-10-CM

## 2024-12-11 DIAGNOSIS — M54.42 CHRONIC MIDLINE LOW BACK PAIN WITH BILATERAL SCIATICA: ICD-10-CM

## 2024-12-11 DIAGNOSIS — G89.29 CHRONIC MIDLINE LOW BACK PAIN WITH BILATERAL SCIATICA: ICD-10-CM

## 2024-12-11 DIAGNOSIS — M54.41 CHRONIC MIDLINE LOW BACK PAIN WITH BILATERAL SCIATICA: ICD-10-CM

## 2024-12-11 PROCEDURE — 72148 MRI LUMBAR SPINE W/O DYE: CPT

## 2025-01-21 ENCOUNTER — OFFICE VISIT (OUTPATIENT)
Dept: NEUROSURGERY | Age: 75
End: 2025-01-21
Payer: MEDICARE

## 2025-01-21 VITALS
HEART RATE: 74 BPM | HEIGHT: 61 IN | WEIGHT: 166 LBS | SYSTOLIC BLOOD PRESSURE: 142 MMHG | DIASTOLIC BLOOD PRESSURE: 80 MMHG | BODY MASS INDEX: 31.34 KG/M2 | OXYGEN SATURATION: 98 % | RESPIRATION RATE: 18 BRPM

## 2025-01-21 DIAGNOSIS — M51.9 SCHMORL'S NODE: ICD-10-CM

## 2025-01-21 DIAGNOSIS — G89.29 CHRONIC MIDLINE LOW BACK PAIN WITH BILATERAL SCIATICA: ICD-10-CM

## 2025-01-21 DIAGNOSIS — M51.362 DEGENERATION OF INTERVERTEBRAL DISC OF LUMBAR REGION WITH DISCOGENIC BACK PAIN AND LOWER EXTREMITY PAIN: Primary | ICD-10-CM

## 2025-01-21 DIAGNOSIS — M54.42 CHRONIC MIDLINE LOW BACK PAIN WITH BILATERAL SCIATICA: ICD-10-CM

## 2025-01-21 DIAGNOSIS — M43.16 SPONDYLOLISTHESIS AT L2-L3 LEVEL: ICD-10-CM

## 2025-01-21 DIAGNOSIS — M54.41 CHRONIC MIDLINE LOW BACK PAIN WITH BILATERAL SCIATICA: ICD-10-CM

## 2025-01-21 PROCEDURE — 3077F SYST BP >= 140 MM HG: CPT | Performed by: NURSE PRACTITIONER

## 2025-01-21 PROCEDURE — 1123F ACP DISCUSS/DSCN MKR DOCD: CPT | Performed by: NURSE PRACTITIONER

## 2025-01-21 PROCEDURE — 99214 OFFICE O/P EST MOD 30 MIN: CPT | Performed by: NURSE PRACTITIONER

## 2025-01-21 PROCEDURE — 3079F DIAST BP 80-89 MM HG: CPT | Performed by: NURSE PRACTITIONER

## 2025-01-21 PROCEDURE — 1159F MED LIST DOCD IN RCRD: CPT | Performed by: NURSE PRACTITIONER

## 2025-01-21 ASSESSMENT — ENCOUNTER SYMPTOMS
GASTROINTESTINAL NEGATIVE: 1
RESPIRATORY NEGATIVE: 1
BACK PAIN: 1
EYES NEGATIVE: 1

## 2025-01-21 NOTE — PROGRESS NOTES
[facility]  Office Visit      Chief Complaint   Patient presents with    Results     MRI LUMBAR (12/11/2024)    Follow-up     Patient presents for follow up after MRI. She states she still has good and bad days with her symptoms. She states she does have numbness/tingling in her BLE when she stands for long periods of time.       HISTORY OF PRESENT ILLNESS:    Sherice Mcfarland is a 74 y.o. female with COPD, HTN who we initially evaluated back in 2023 for a skull lesion that we felt to be benign here today with complaints of low back pain and a new CT of the lumbar spine for review.      At our last visit she was having severe low back pain. The pain was worse with bending, twisting, stretching.  The pain did travel into the BLE lateral thighs worsened with standing or walking for about 5 minutes.  She was no longer walking through stores she is riding a scooter.  Has associated numbness and paresthesias of the same distribution.      Continues to have low back and BLE paresthesias L>R.  Pain is worse in the back.  Pain worsened with activity.  Walking exacerbates the pain.  Family states that she is moving a little better than at last visit.        She has attempted the following non-operative treatments:  Gabapentin 300mg TID  Tylenol   Norco - from ED - helped some   Muscle relaxer from ED - helped some   Chiropractic therapy - made her pain much worse   Physical therapy in the past  - made pain worse       She is a smoker.  She is on ASA.                Past Medical History:   Diagnosis Date    COPD (chronic obstructive pulmonary disease) (HCC)     GERD (gastroesophageal reflux disease)     Hypertension        Past Surgical History:   Procedure Laterality Date    BRONCHOSCOPY Bilateral 9/12/2023    BRONCHOSCOPY performed by Wally Dominique MD at [facility]    CARPAL TUNNEL RELEASE Right     CENTRAL LINE  9/11/2023    GALLBLADDER SURGERY      HYSTERECTOMY, TOTAL ABDOMINAL (CERVIX REMOVED)

## 2025-01-24 ENCOUNTER — TRANSCRIBE ORDERS (OUTPATIENT)
Dept: ADMINISTRATIVE | Age: 75
End: 2025-01-24

## 2025-01-24 DIAGNOSIS — Z12.31 OTHER SCREENING MAMMOGRAM: Primary | ICD-10-CM

## 2025-02-10 ENCOUNTER — HOSPITAL ENCOUNTER (OUTPATIENT)
Dept: WOMENS IMAGING | Age: 75
Discharge: HOME | End: 2025-02-10
Payer: MEDICARE

## 2025-02-10 VITALS — WEIGHT: 160 LBS | HEIGHT: 60 IN | BODY MASS INDEX: 31.41 KG/M2

## 2025-02-10 DIAGNOSIS — Z12.31 OTHER SCREENING MAMMOGRAM: ICD-10-CM

## 2025-02-10 PROCEDURE — 77067 SCR MAMMO BI INCL CAD: CPT

## 2025-03-11 ENCOUNTER — OFFICE VISIT (OUTPATIENT)
Dept: PAIN MANAGEMENT | Age: 75
End: 2025-03-11
Payer: MEDICARE

## 2025-03-11 VITALS
TEMPERATURE: 96.5 F | WEIGHT: 167 LBS | RESPIRATION RATE: 16 BRPM | BODY MASS INDEX: 31.53 KG/M2 | OXYGEN SATURATION: 99 % | HEART RATE: 66 BPM | HEIGHT: 61 IN | DIASTOLIC BLOOD PRESSURE: 85 MMHG | SYSTOLIC BLOOD PRESSURE: 160 MMHG

## 2025-03-11 DIAGNOSIS — Z51.81 ENCOUNTER FOR MONITORING OPIOID MAINTENANCE THERAPY: ICD-10-CM

## 2025-03-11 DIAGNOSIS — Z02.89 PAIN MANAGEMENT CONTRACT AGREEMENT: ICD-10-CM

## 2025-03-11 DIAGNOSIS — M54.16 LUMBAR RADICULOPATHY: Primary | ICD-10-CM

## 2025-03-11 DIAGNOSIS — Z79.891 ENCOUNTER FOR MONITORING OPIOID MAINTENANCE THERAPY: ICD-10-CM

## 2025-03-11 PROCEDURE — 99204 OFFICE O/P NEW MOD 45 MIN: CPT

## 2025-03-11 PROCEDURE — 1159F MED LIST DOCD IN RCRD: CPT

## 2025-03-11 PROCEDURE — 1160F RVW MEDS BY RX/DR IN RCRD: CPT

## 2025-03-11 PROCEDURE — 1123F ACP DISCUSS/DSCN MKR DOCD: CPT

## 2025-03-11 PROCEDURE — 3077F SYST BP >= 140 MM HG: CPT

## 2025-03-11 PROCEDURE — 3079F DIAST BP 80-89 MM HG: CPT

## 2025-03-11 PROCEDURE — 1125F AMNT PAIN NOTED PAIN PRSNT: CPT

## 2025-03-11 RX ORDER — GABAPENTIN 300 MG/1
300 CAPSULE ORAL DAILY
COMMUNITY
Start: 2025-01-16

## 2025-03-11 RX ORDER — GABAPENTIN 100 MG/1
100 CAPSULE ORAL 3 TIMES DAILY
Qty: 90 CAPSULE | Refills: 0 | Status: SHIPPED | OUTPATIENT
Start: 2025-03-11 | End: 2025-04-10

## 2025-03-11 ASSESSMENT — PATIENT HEALTH QUESTIONNAIRE - PHQ9
SUM OF ALL RESPONSES TO PHQ QUESTIONS 1-9: 0
2. FEELING DOWN, DEPRESSED OR HOPELESS: NOT AT ALL
1. LITTLE INTEREST OR PLEASURE IN DOING THINGS: NOT AT ALL

## 2025-03-11 ASSESSMENT — ENCOUNTER SYMPTOMS
GASTROINTESTINAL NEGATIVE: 1
RESPIRATORY NEGATIVE: 1
EYES NEGATIVE: 1
BACK PAIN: 1

## 2025-03-11 NOTE — PROGRESS NOTES
(3/11/2025  8:00 AM)     Employment: Employer:  Retired     Previous Injury: No    Physical Therapy In the last 12 months? No    Did Physical Therapy make the pain better?  N/A     Previous Imaging:   [facility] DIGITAL SCREEN SELF REFERRAL W OR WO CAD BILATERAL  Result Date: 2/10/2025  EXAM: BILATERAL DIGITAL SCREENING MAMMOGRAM.  TECHNIQUE: Bilateral digital CC and MLO images with tomosynthesis.  CAD was utilized.  HISTORY: Routine screening mammogram.  COMPARISON: A 06/20/2021 03/10/2020  FINDINGS: The breast tissue is almost entirely fatty.  There is no dominant mass, suspicious microcalcifications, or architectural distortion in either breast.      BI-RADS 1- Negative.  RECOMMENDATION: Recommend patient retu.rn in 1 year for annual screening mammogram.   ______________________________________ Electronically signed by: ZACK MENDEZ M.D. Date:     02/10/2025 Time:    11:09 Performing Facility: [place] [address] Phone: [phone]       MRI Result (most recent):  MRI LUMBAR SPINE WO CONTRAST 12/11/2024    Narrative  EXAM:  LUMBAR SPINE MRI WITHOUT CONTRAST    HISTORY:  Degeneration of lumbar disc.    TECHNIQUE:  Multi-sequential multiplanar MRI lumbar spine without intravenous contrast.    COMPARISON:  None.    FINDINGS:    There is chronic compression fracture deformity of L4 vertebral body, with superimposed large Schmorl's node in the superior endplate.  Additional Schmorl's nodes are noted in the endplates of T11-S1.    There is severe loss of disc height at L2-L3, and L5-S1.  Modic type 1 changes are noted in opposing endplates of L2-L3.  Modic type 2 changes are noted in opposing endplates of L2-L3, and L5-S1.  There is grade 1 retrolisthesis of L2-L3 and L3-L4.    The lumbar lordosis is maintained.    The conus medullaris terminates at L2.  The cauda equina is normal in signal and morphology.    There is no epidural fluid collection/hematoma.    The prevertebral and

## 2025-03-31 DIAGNOSIS — Z79.891 ENCOUNTER FOR MONITORING OPIOID MAINTENANCE THERAPY: ICD-10-CM

## 2025-03-31 DIAGNOSIS — Z51.81 ENCOUNTER FOR MONITORING OPIOID MAINTENANCE THERAPY: ICD-10-CM

## 2025-05-21 ENCOUNTER — OFFICE VISIT (OUTPATIENT)
Dept: PAIN MANAGEMENT | Age: 75
End: 2025-05-21
Payer: MEDICARE

## 2025-05-21 VITALS
SYSTOLIC BLOOD PRESSURE: 148 MMHG | BODY MASS INDEX: 32.74 KG/M2 | OXYGEN SATURATION: 100 % | WEIGHT: 173.4 LBS | HEIGHT: 61 IN | DIASTOLIC BLOOD PRESSURE: 86 MMHG | HEART RATE: 65 BPM | RESPIRATION RATE: 16 BRPM | TEMPERATURE: 96.9 F

## 2025-05-21 DIAGNOSIS — M79.18 MYOFASCIAL MUSCLE PAIN: ICD-10-CM

## 2025-05-21 DIAGNOSIS — G89.29 CHRONIC BILATERAL LOW BACK PAIN WITHOUT SCIATICA: Primary | ICD-10-CM

## 2025-05-21 DIAGNOSIS — Z02.89 PAIN MANAGEMENT CONTRACT SIGNED: ICD-10-CM

## 2025-05-21 DIAGNOSIS — M54.16 LUMBAR RADICULOPATHY: ICD-10-CM

## 2025-05-21 DIAGNOSIS — M54.50 CHRONIC BILATERAL LOW BACK PAIN WITHOUT SCIATICA: Primary | ICD-10-CM

## 2025-05-21 PROCEDURE — 1125F AMNT PAIN NOTED PAIN PRSNT: CPT

## 2025-05-21 PROCEDURE — 99214 OFFICE O/P EST MOD 30 MIN: CPT

## 2025-05-21 PROCEDURE — 1159F MED LIST DOCD IN RCRD: CPT

## 2025-05-21 PROCEDURE — 1160F RVW MEDS BY RX/DR IN RCRD: CPT

## 2025-05-21 PROCEDURE — 3079F DIAST BP 80-89 MM HG: CPT

## 2025-05-21 PROCEDURE — 3077F SYST BP >= 140 MM HG: CPT

## 2025-05-21 PROCEDURE — 1123F ACP DISCUSS/DSCN MKR DOCD: CPT

## 2025-05-21 RX ORDER — GABAPENTIN 100 MG/1
200 CAPSULE ORAL EVERY MORNING
Qty: 60 CAPSULE | Refills: 0 | Status: SHIPPED | OUTPATIENT
Start: 2025-05-21 | End: 2025-06-20

## 2025-05-21 ASSESSMENT — ENCOUNTER SYMPTOMS
EYES NEGATIVE: 1
BACK PAIN: 1
RESPIRATORY NEGATIVE: 1
GASTROINTESTINAL NEGATIVE: 1

## 2025-05-21 NOTE — PROGRESS NOTES
taking any other prescribe sleeping medication prescribe by another medical provider.    Activity: Continued HEP - discussed home exercise program which is beneficial to pain reduction and core strengthening. Patient was encouraged stretching exercise at least twice daily with a focus on torso (core) strengthening. Patient to stretch and perform exercises before bed and upon wakening. Patient also to perform targeted exercise program provided as a handout 3 times weekly.     Goal:  Pain Management Goals of Therapy:   [] Resolution in pain  [x] Decrease in pain level  [x] Improvement in ADL's  [x] Increase in activities with less pain  [] Decrease in medication     [] No Controlled Meds Prescribed    [x] Controlled substance monitoring:    [x] Discussed medication side effects, risk of tolerance and/or dependence, and/or alternative treatment  [] Discussed the detrimental effects of long term narcotic use in younger patients especially women of childbearing years.  [x] No signs and symptoms of potential drug abuse or diversion were identified  [] Signs of potential drug abuse or diversion were identified   [] ORT Score   [] UDS non-compliant   [] See Note  [] Random urine drug screen sent today to maintain compliance with medication monitoring.   [x] Random urine drug screen not completed today   [] Deferred New Patient   [] Deferred no controlled medications prescribed  [x] Compliant 3/11/2025  [] Not Compliant see note  [x] Office agreement with provider signed today. Contract reviewed with patient, who verbalized understanding.   [] Office agreement with provider on file under media   [x] Controlled medication agreement with provider signed today. Contract reviewed with patient, who verbalized understanding. 5/21/2025  [] Medication agreement with provider on file under media   [] Medication regimen effective with no c/o side effects and continued  []  New patient continuing current medication while developing

## 2025-06-10 ENCOUNTER — TELEPHONE (OUTPATIENT)
Dept: PAIN MANAGEMENT | Age: 75
End: 2025-06-10

## 2025-06-10 NOTE — TELEPHONE ENCOUNTER
Called [facility] to check status of referral. Pt had initial eval yesterday and has upcoming appts as well.

## (undated) DEVICE — SYSTEM VENT MED AD NASAL PAP SUPERNO2VA

## (undated) DEVICE — TUBING, SUCTION, 1/4" X 12', STRAIGHT: Brand: MEDLINE

## (undated) DEVICE — ENDO KIT,LOURDES HOSPITAL: Brand: MEDLINE INDUSTRIES, INC.

## (undated) DEVICE — SINGLE USE BIOPSY VALVE MAJ-210: Brand: SINGLE USE BIOPSY VALVE (STERILE)

## (undated) DEVICE — SYRINGE MED 20ML STD CLR PLAS LUERSLIP TIP N CTRL DISP

## (undated) DEVICE — SINGLE USE SUCTION VALVE MAJ-209: Brand: SINGLE USE SUCTION VALVE (STERILE)

## (undated) DEVICE — TRAP,MUCUS SPECIMEN, 80CC: Brand: MEDLINE